# Patient Record
Sex: FEMALE | Race: BLACK OR AFRICAN AMERICAN | NOT HISPANIC OR LATINO | Employment: UNEMPLOYED | URBAN - METROPOLITAN AREA
[De-identification: names, ages, dates, MRNs, and addresses within clinical notes are randomized per-mention and may not be internally consistent; named-entity substitution may affect disease eponyms.]

---

## 2023-04-18 LAB
CHLAMYDIA TRACH., AMPLIFIED: NEGATIVE
N. GONORRHEA, AMPLIFIED: NEGATIVE
TRICHOMONAS VAGINALIS: NEGATIVE

## 2023-04-20 LAB
CHORIOGONADOTROPIN (MIU/ML) IN SER/PLAS: <3 IU/L
HEPATITIS B VIRUS SURFACE AG PRESENCE IN SERUM: NONREACTIVE
HEPATITIS C VIRUS AB PRESENCE IN SERUM: NONREACTIVE
HIV 1/ 2 AG/AB SCREEN: NONREACTIVE
SYPHILIS TOTAL AB: NONREACTIVE

## 2023-05-09 DIAGNOSIS — E55.9 VITAMIN D DEFICIENCY: Primary | ICD-10-CM

## 2023-05-09 RX ORDER — ERGOCALCIFEROL (VITAMIN D2) 50 MCG
50 CAPSULE ORAL DAILY
Qty: 90 CAPSULE | Refills: 1 | Status: SHIPPED | OUTPATIENT
Start: 2023-05-09 | End: 2023-09-12 | Stop reason: SDUPTHER

## 2023-05-22 DIAGNOSIS — Z00.00 PREVENTATIVE HEALTH CARE: ICD-10-CM

## 2023-05-22 RX ORDER — DULOXETIN HYDROCHLORIDE 60 MG/1
60 CAPSULE, DELAYED RELEASE ORAL DAILY
Qty: 90 CAPSULE | Refills: 0 | Status: SHIPPED | OUTPATIENT
Start: 2023-05-22 | End: 2023-09-01

## 2023-07-02 LAB
NIL(NEG) CONTROL SPOT COUNT: NORMAL
PANEL A SPOT COUNT: 0
PANEL B SPOT COUNT: 0
POS CONTROL SPOT COUNT: NORMAL
T-SPOT. TB INTERPRETATION: NEGATIVE

## 2023-08-15 LAB
BASOPHILS (10*3/UL) IN BLOOD BY AUTOMATED COUNT: 0.03 X10E9/L (ref 0–0.1)
BASOPHILS/100 LEUKOCYTES IN BLOOD BY AUTOMATED COUNT: 0.9 % (ref 0–2)
EOSINOPHILS (10*3/UL) IN BLOOD BY AUTOMATED COUNT: 0.04 X10E9/L (ref 0–0.7)
EOSINOPHILS/100 LEUKOCYTES IN BLOOD BY AUTOMATED COUNT: 1.1 % (ref 0–6)
ERYTHROCYTE DISTRIBUTION WIDTH (RATIO) BY AUTOMATED COUNT: 17.9 % (ref 11.5–14.5)
ERYTHROCYTE MEAN CORPUSCULAR HEMOGLOBIN CONCENTRATION (G/DL) BY AUTOMATED: 28.2 G/DL (ref 32–36)
ERYTHROCYTE MEAN CORPUSCULAR VOLUME (FL) BY AUTOMATED COUNT: 67 FL (ref 80–100)
ERYTHROCYTES (10*6/UL) IN BLOOD BY AUTOMATED COUNT: 4.58 X10E12/L (ref 4–5.2)
HEMATOCRIT (%) IN BLOOD BY AUTOMATED COUNT: 30.8 % (ref 36–46)
HEMOGLOBIN (G/DL) IN BLOOD: 8.7 G/DL (ref 12–16)
IMMATURE GRANULOCYTES/100 LEUKOCYTES IN BLOOD BY AUTOMATED COUNT: 0.3 % (ref 0–0.9)
LEUKOCYTES (10*3/UL) IN BLOOD BY AUTOMATED COUNT: 3.5 X10E9/L (ref 4.4–11.3)
LYMPHOCYTES (10*3/UL) IN BLOOD BY AUTOMATED COUNT: 1.89 X10E9/L (ref 1.2–4.8)
LYMPHOCYTES/100 LEUKOCYTES IN BLOOD BY AUTOMATED COUNT: 54.3 % (ref 13–44)
MONOCYTES (10*3/UL) IN BLOOD BY AUTOMATED COUNT: 0.25 X10E9/L (ref 0.1–1)
MONOCYTES/100 LEUKOCYTES IN BLOOD BY AUTOMATED COUNT: 7.2 % (ref 2–10)
NEUTROPHILS (10*3/UL) IN BLOOD BY AUTOMATED COUNT: 1.26 X10E9/L (ref 1.2–7.7)
NEUTROPHILS/100 LEUKOCYTES IN BLOOD BY AUTOMATED COUNT: 36.2 % (ref 40–80)
PLATELETS (10*3/UL) IN BLOOD AUTOMATED COUNT: 401 X10E9/L (ref 150–450)

## 2023-08-27 DIAGNOSIS — Z00.00 PREVENTATIVE HEALTH CARE: ICD-10-CM

## 2023-08-30 ENCOUNTER — HOSPITAL ENCOUNTER (OUTPATIENT)
Dept: DATA CONVERSION | Facility: HOSPITAL | Age: 23
End: 2023-08-31
Attending: OBSTETRICS & GYNECOLOGY | Admitting: OBSTETRICS & GYNECOLOGY
Payer: COMMERCIAL

## 2023-08-30 DIAGNOSIS — R10.2 PELVIC AND PERINEAL PAIN: ICD-10-CM

## 2023-08-30 DIAGNOSIS — D50.0 IRON DEFICIENCY ANEMIA SECONDARY TO BLOOD LOSS (CHRONIC): ICD-10-CM

## 2023-08-30 DIAGNOSIS — J45.909 UNSPECIFIED ASTHMA, UNCOMPLICATED (HHS-HCC): ICD-10-CM

## 2023-08-30 LAB
ERYTHROCYTE DISTRIBUTION WIDTH (RATIO) BY AUTOMATED COUNT: 17.8 % (ref 11.5–14.5)
ERYTHROCYTE MEAN CORPUSCULAR HEMOGLOBIN CONCENTRATION (G/DL) BY AUTOMATED: 29.7 G/DL (ref 32–36)
ERYTHROCYTE MEAN CORPUSCULAR VOLUME (FL) BY AUTOMATED COUNT: 65 FL (ref 80–100)
ERYTHROCYTES (10*6/UL) IN BLOOD BY AUTOMATED COUNT: 4.35 X10E12/L (ref 4–5.2)
HEMATOCRIT (%) IN BLOOD BY AUTOMATED COUNT: 28.3 % (ref 36–46)
HEMOGLOBIN (G/DL) IN BLOOD: 8.4 G/DL (ref 12–16)
LEUKOCYTES (10*3/UL) IN BLOOD BY AUTOMATED COUNT: 11.8 X10E9/L (ref 4.4–11.3)
PLATELETS (10*3/UL) IN BLOOD AUTOMATED COUNT: 341 X10E9/L (ref 150–450)

## 2023-08-31 LAB
BASOPHILS (10*3/UL) IN BLOOD BY AUTOMATED COUNT: 0.02 X10E9/L (ref 0–0.1)
BASOPHILS/100 LEUKOCYTES IN BLOOD BY AUTOMATED COUNT: 0.2 % (ref 0–2)
EOSINOPHILS (10*3/UL) IN BLOOD BY AUTOMATED COUNT: 0.01 X10E9/L (ref 0–0.7)
EOSINOPHILS/100 LEUKOCYTES IN BLOOD BY AUTOMATED COUNT: 0.1 % (ref 0–6)
ERYTHROCYTE DISTRIBUTION WIDTH (RATIO) BY AUTOMATED COUNT: 17.8 % (ref 11.5–14.5)
ERYTHROCYTE MEAN CORPUSCULAR HEMOGLOBIN CONCENTRATION (G/DL) BY AUTOMATED: 30 G/DL (ref 32–36)
ERYTHROCYTE MEAN CORPUSCULAR VOLUME (FL) BY AUTOMATED COUNT: 64 FL (ref 80–100)
ERYTHROCYTES (10*6/UL) IN BLOOD BY AUTOMATED COUNT: 4.39 X10E12/L (ref 4–5.2)
HEMATOCRIT (%) IN BLOOD BY AUTOMATED COUNT: 28 % (ref 36–46)
HEMOGLOBIN (G/DL) IN BLOOD: 8.4 G/DL (ref 12–16)
IMMATURE GRANULOCYTES/100 LEUKOCYTES IN BLOOD BY AUTOMATED COUNT: 0.3 % (ref 0–0.9)
LEUKOCYTES (10*3/UL) IN BLOOD BY AUTOMATED COUNT: 10 X10E9/L (ref 4.4–11.3)
LYMPHOCYTES (10*3/UL) IN BLOOD BY AUTOMATED COUNT: 2.44 X10E9/L (ref 1.2–4.8)
LYMPHOCYTES/100 LEUKOCYTES IN BLOOD BY AUTOMATED COUNT: 24.4 % (ref 13–44)
MONOCYTES (10*3/UL) IN BLOOD BY AUTOMATED COUNT: 0.65 X10E9/L (ref 0.1–1)
MONOCYTES/100 LEUKOCYTES IN BLOOD BY AUTOMATED COUNT: 6.5 % (ref 2–10)
NEUTROPHILS (10*3/UL) IN BLOOD BY AUTOMATED COUNT: 6.84 X10E9/L (ref 1.2–7.7)
NEUTROPHILS/100 LEUKOCYTES IN BLOOD BY AUTOMATED COUNT: 68.5 % (ref 40–80)
PLATELETS (10*3/UL) IN BLOOD AUTOMATED COUNT: 345 X10E9/L (ref 150–450)

## 2023-09-01 RX ORDER — DULOXETIN HYDROCHLORIDE 60 MG/1
60 CAPSULE, DELAYED RELEASE ORAL DAILY
Qty: 90 CAPSULE | Refills: 0 | Status: SHIPPED | OUTPATIENT
Start: 2023-09-01

## 2023-09-05 PROBLEM — N76.0 BACTERIAL VAGINOSIS: Status: ACTIVE | Noted: 2023-09-05

## 2023-09-05 PROBLEM — S00.93XA CONTUSION OF HEAD: Status: ACTIVE | Noted: 2023-09-05

## 2023-09-05 PROBLEM — R10.2 CHRONIC PELVIC PAIN IN FEMALE: Status: ACTIVE | Noted: 2023-09-05

## 2023-09-05 PROBLEM — N83.201 HEMORRHAGIC CYST OF RIGHT OVARY: Status: ACTIVE | Noted: 2023-09-05

## 2023-09-05 PROBLEM — S20.219A CONTUSION OF CHEST WALL: Status: ACTIVE | Noted: 2023-09-05

## 2023-09-05 PROBLEM — R06.02 SHORTNESS OF BREATH: Status: ACTIVE | Noted: 2023-09-05
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
## 2023-09-05 PROBLEM — G47.9 SLEEP DIFFICULTIES: Status: ACTIVE | Noted: 2023-09-05

## 2023-09-05 PROBLEM — J45.901 ASTHMA EXACERBATION (HHS-HCC): Status: ACTIVE | Noted: 2023-09-05

## 2023-09-05 PROBLEM — N93.9 ABNORMAL VAGINAL BLEEDING: Status: ACTIVE | Noted: 2023-09-05

## 2023-09-05 PROBLEM — J45.909 ASTHMA (HHS-HCC): Status: ACTIVE | Noted: 2023-09-05

## 2023-09-05 PROBLEM — N92.1 MENORRHAGIA WITH IRREGULAR CYCLE: Status: ACTIVE | Noted: 2023-09-05

## 2023-09-05 PROBLEM — G89.29 CHRONIC PELVIC PAIN IN FEMALE: Status: ACTIVE | Noted: 2023-09-05

## 2023-09-05 PROBLEM — E55.9 VITAMIN D DEFICIENCY: Status: ACTIVE | Noted: 2023-09-05

## 2023-09-05 PROBLEM — S13.9XXA NECK SPRAIN: Status: ACTIVE | Noted: 2023-09-05

## 2023-09-05 PROBLEM — D50.9 IRON DEFICIENCY ANEMIA: Status: ACTIVE | Noted: 2023-09-05

## 2023-09-05 PROBLEM — N76.0 ACUTE VAGINITIS: Status: ACTIVE | Noted: 2023-09-05

## 2023-09-05 PROBLEM — S00.83XA FACIAL CONTUSION: Status: ACTIVE | Noted: 2023-09-05

## 2023-09-05 PROBLEM — R10.30 ABDOMINAL PAIN, LOWER: Status: ACTIVE | Noted: 2023-09-05

## 2023-09-05 PROBLEM — B37.9 YEAST INFECTION: Status: ACTIVE | Noted: 2023-09-05

## 2023-09-05 PROBLEM — B96.89 BACTERIAL VAGINOSIS: Status: ACTIVE | Noted: 2023-09-05

## 2023-09-05 PROBLEM — N89.8 VAGINAL DISCHARGE: Status: ACTIVE | Noted: 2023-09-05

## 2023-09-05 PROBLEM — G43.119 INTRACTABLE MIGRAINE WITH AURA WITHOUT STATUS MIGRAINOSUS: Status: ACTIVE | Noted: 2023-09-05

## 2023-09-05 PROBLEM — R05.3 PERSISTENT COUGH: Status: ACTIVE | Noted: 2023-09-05

## 2023-09-05 PROBLEM — F52.6 SEXUAL PAIN DISORDER: Status: ACTIVE | Noted: 2023-09-05

## 2023-09-05 PROBLEM — N94.6 DYSMENORRHEA: Status: ACTIVE | Noted: 2023-09-05

## 2023-09-05 PROBLEM — M62.89 PFD (PELVIC FLOOR DYSFUNCTION): Status: ACTIVE | Noted: 2023-09-05

## 2023-09-05 RX ORDER — SULFAMETHOXAZOLE AND TRIMETHOPRIM 800; 160 MG/1; MG/1
1 TABLET ORAL 2 TIMES DAILY
COMMUNITY
Start: 2023-08-02 | End: 2023-09-13 | Stop reason: ALTCHOICE

## 2023-09-05 RX ORDER — FERROUS SULFATE 325(65) MG
65 TABLET ORAL 2 TIMES DAILY
COMMUNITY
Start: 2022-10-17

## 2023-09-05 RX ORDER — MOMETASONE FUROATE AND FORMOTEROL FUMARATE DIHYDRATE 100; 5 UG/1; UG/1
2 AEROSOL RESPIRATORY (INHALATION) 2 TIMES DAILY
COMMUNITY
Start: 2020-10-28 | End: 2023-09-12 | Stop reason: SDUPTHER

## 2023-09-05 RX ORDER — ESCITALOPRAM OXALATE 10 MG/1
10 TABLET ORAL
COMMUNITY
Start: 2023-07-19

## 2023-09-05 RX ORDER — NORETHINDRONE 5 MG/1
5 TABLET ORAL DAILY
COMMUNITY
Start: 2022-05-19

## 2023-09-12 ENCOUNTER — OFFICE VISIT (OUTPATIENT)
Dept: PRIMARY CARE | Facility: CLINIC | Age: 23
End: 2023-09-12
Payer: COMMERCIAL

## 2023-09-12 VITALS
HEART RATE: 82 BPM | DIASTOLIC BLOOD PRESSURE: 62 MMHG | BODY MASS INDEX: 30.04 KG/M2 | HEIGHT: 60 IN | SYSTOLIC BLOOD PRESSURE: 100 MMHG | WEIGHT: 153 LBS

## 2023-09-12 DIAGNOSIS — F50.89 PICA: ICD-10-CM

## 2023-09-12 DIAGNOSIS — E55.9 VITAMIN D DEFICIENCY: ICD-10-CM

## 2023-09-12 DIAGNOSIS — F31.9 BIPOLAR AFFECTIVE DISORDER, REMISSION STATUS UNSPECIFIED (MULTI): ICD-10-CM

## 2023-09-12 DIAGNOSIS — G47.9 SLEEP DISTURBANCE: ICD-10-CM

## 2023-09-12 DIAGNOSIS — J45.909 ASTHMA, UNSPECIFIED ASTHMA SEVERITY, UNSPECIFIED WHETHER COMPLICATED, UNSPECIFIED WHETHER PERSISTENT (HHS-HCC): ICD-10-CM

## 2023-09-12 DIAGNOSIS — Z00.00 ENCOUNTER FOR GENERAL ADULT MEDICAL EXAMINATION WITHOUT ABNORMAL FINDINGS: ICD-10-CM

## 2023-09-12 DIAGNOSIS — R53.83 FATIGUE, UNSPECIFIED TYPE: Primary | ICD-10-CM

## 2023-09-12 PROBLEM — F32.9 MAJOR DEPRESSIVE DISORDER, SINGLE EPISODE, UNSPECIFIED: Status: ACTIVE | Noted: 2023-08-25

## 2023-09-12 PROBLEM — F39 UNSPECIFIED MOOD (AFFECTIVE) DISORDER (CMS-HCC): Status: ACTIVE | Noted: 2023-08-25

## 2023-09-12 PROBLEM — F43.10 POST-TRAUMATIC STRESS DISORDER, UNSPECIFIED: Status: ACTIVE | Noted: 2023-08-25

## 2023-09-12 LAB
ANION GAP IN SER/PLAS: 11 MMOL/L (ref 10–20)
BASOPHILS (10*3/UL) IN BLOOD BY AUTOMATED COUNT: 0.02 X10E9/L (ref 0–0.1)
BASOPHILS/100 LEUKOCYTES IN BLOOD BY AUTOMATED COUNT: 0.4 % (ref 0–2)
CALCIDIOL (25 OH VITAMIN D3) (NG/ML) IN SER/PLAS: 26 NG/ML
CALCIUM (MG/DL) IN SER/PLAS: 9.1 MG/DL (ref 8.6–10.6)
CARBON DIOXIDE, TOTAL (MMOL/L) IN SER/PLAS: 25 MMOL/L (ref 21–32)
CHLORIDE (MMOL/L) IN SER/PLAS: 106 MMOL/L (ref 98–107)
CREATININE (MG/DL) IN SER/PLAS: 0.75 MG/DL (ref 0.5–1.05)
EOSINOPHILS (10*3/UL) IN BLOOD BY AUTOMATED COUNT: 0.07 X10E9/L (ref 0–0.7)
EOSINOPHILS/100 LEUKOCYTES IN BLOOD BY AUTOMATED COUNT: 1.5 % (ref 0–6)
ERYTHROCYTE DISTRIBUTION WIDTH (RATIO) BY AUTOMATED COUNT: 18.7 % (ref 11.5–14.5)
ERYTHROCYTE MEAN CORPUSCULAR HEMOGLOBIN CONCENTRATION (G/DL) BY AUTOMATED: 28 G/DL (ref 32–36)
ERYTHROCYTE MEAN CORPUSCULAR VOLUME (FL) BY AUTOMATED COUNT: 68 FL (ref 80–100)
ERYTHROCYTES (10*6/UL) IN BLOOD BY AUTOMATED COUNT: 4.44 X10E12/L (ref 4–5.2)
FERRITIN (UG/LL) IN SER/PLAS: 20 UG/L (ref 8–150)
GFR FEMALE: >90 ML/MIN/1.73M2
GLUCOSE (MG/DL) IN SER/PLAS: 90 MG/DL (ref 74–99)
HEMATOCRIT (%) IN BLOOD BY AUTOMATED COUNT: 30.4 % (ref 36–46)
HEMOGLOBIN (G/DL) IN BLOOD: 8.5 G/DL (ref 12–16)
IMMATURE GRANULOCYTES/100 LEUKOCYTES IN BLOOD BY AUTOMATED COUNT: 0.2 % (ref 0–0.9)
IRON (UG/DL) IN SER/PLAS: 18 UG/DL (ref 35–150)
IRON BINDING CAPACITY (UG/DL) IN SER/PLAS: 430 UG/DL (ref 240–445)
IRON SATURATION (%) IN SER/PLAS: 4 % (ref 25–45)
LEUKOCYTES (10*3/UL) IN BLOOD BY AUTOMATED COUNT: 4.8 X10E9/L (ref 4.4–11.3)
LYMPHOCYTES (10*3/UL) IN BLOOD BY AUTOMATED COUNT: 2.01 X10E9/L (ref 1.2–4.8)
LYMPHOCYTES/100 LEUKOCYTES IN BLOOD BY AUTOMATED COUNT: 41.7 % (ref 13–44)
MONOCYTES (10*3/UL) IN BLOOD BY AUTOMATED COUNT: 0.27 X10E9/L (ref 0.1–1)
MONOCYTES/100 LEUKOCYTES IN BLOOD BY AUTOMATED COUNT: 5.6 % (ref 2–10)
NEUTROPHILS (10*3/UL) IN BLOOD BY AUTOMATED COUNT: 2.44 X10E9/L (ref 1.2–7.7)
NEUTROPHILS/100 LEUKOCYTES IN BLOOD BY AUTOMATED COUNT: 50.6 % (ref 40–80)
NRBC (PER 100 WBCS) BY AUTOMATED COUNT: 0 /100 WBC (ref 0–0)
PLATELETS (10*3/UL) IN BLOOD AUTOMATED COUNT: 419 X10E9/L (ref 150–450)
POTASSIUM (MMOL/L) IN SER/PLAS: 4.1 MMOL/L (ref 3.5–5.3)
SODIUM (MMOL/L) IN SER/PLAS: 138 MMOL/L (ref 136–145)
THYROTROPIN (MIU/L) IN SER/PLAS BY DETECTION LIMIT <= 0.05 MIU/L: 2.56 MIU/L (ref 0.44–3.98)
UREA NITROGEN (MG/DL) IN SER/PLAS: 9 MG/DL (ref 6–23)

## 2023-09-12 PROCEDURE — 80048 BASIC METABOLIC PNL TOTAL CA: CPT

## 2023-09-12 PROCEDURE — 83540 ASSAY OF IRON: CPT

## 2023-09-12 PROCEDURE — 82728 ASSAY OF FERRITIN: CPT

## 2023-09-12 PROCEDURE — 85025 COMPLETE CBC W/AUTO DIFF WBC: CPT

## 2023-09-12 PROCEDURE — 84443 ASSAY THYROID STIM HORMONE: CPT

## 2023-09-12 PROCEDURE — 83550 IRON BINDING TEST: CPT

## 2023-09-12 PROCEDURE — 99214 OFFICE O/P EST MOD 30 MIN: CPT | Performed by: NURSE PRACTITIONER

## 2023-09-12 PROCEDURE — 82306 VITAMIN D 25 HYDROXY: CPT

## 2023-09-12 RX ORDER — MOMETASONE FUROATE AND FORMOTEROL FUMARATE DIHYDRATE 100; 5 UG/1; UG/1
2 AEROSOL RESPIRATORY (INHALATION) 2 TIMES DAILY
Qty: 13 G | Refills: 1 | Status: SHIPPED | OUTPATIENT
Start: 2023-09-12

## 2023-09-12 RX ORDER — ERGOCALCIFEROL (VITAMIN D2) 50 MCG
50 CAPSULE ORAL DAILY
Qty: 90 CAPSULE | Refills: 1 | Status: SHIPPED | OUTPATIENT
Start: 2023-09-12 | End: 2024-09-11

## 2023-09-12 RX ORDER — ALBUTEROL SULFATE 90 UG/1
2 AEROSOL, METERED RESPIRATORY (INHALATION) EVERY 4 HOURS PRN
Qty: 18 G | Refills: 1 | OUTPATIENT
Start: 2023-09-12 | End: 2023-11-01

## 2023-09-12 ASSESSMENT — PATIENT HEALTH QUESTIONNAIRE - PHQ9
3. TROUBLE FALLING OR STAYING ASLEEP OR SLEEPING TOO MUCH: MORE THAN HALF THE DAYS
5. POOR APPETITE OR OVEREATING: MORE THAN HALF THE DAYS
6. FEELING BAD ABOUT YOURSELF - OR THAT YOU ARE A FAILURE OR HAVE LET YOURSELF OR YOUR FAMILY DOWN: NOT AT ALL
SUM OF ALL RESPONSES TO PHQ QUESTIONS 1-9: 12
4. FEELING TIRED OR HAVING LITTLE ENERGY: MORE THAN HALF THE DAYS
7. TROUBLE CONCENTRATING ON THINGS, SUCH AS READING THE NEWSPAPER OR WATCHING TELEVISION: MORE THAN HALF THE DAYS
8. MOVING OR SPEAKING SO SLOWLY THAT OTHER PEOPLE COULD HAVE NOTICED. OR THE OPPOSITE, BEING SO FIGETY OR RESTLESS THAT YOU HAVE BEEN MOVING AROUND A LOT MORE THAN USUAL: NOT AT ALL
2. FEELING DOWN, DEPRESSED OR HOPELESS: MORE THAN HALF THE DAYS
9. THOUGHTS THAT YOU WOULD BE BETTER OFF DEAD, OR OF HURTING YOURSELF: NOT AT ALL
1. LITTLE INTEREST OR PLEASURE IN DOING THINGS: MORE THAN HALF THE DAYS
SUM OF ALL RESPONSES TO PHQ9 QUESTIONS 1 AND 2: 4

## 2023-09-12 ASSESSMENT — ENCOUNTER SYMPTOMS
LOSS OF SENSATION IN FEET: 0
OCCASIONAL FEELINGS OF UNSTEADINESS: 0
DEPRESSION: 0

## 2023-09-12 NOTE — PROGRESS NOTES
Subjective   Patient ID: Argelia Valentine is a 23 y.o. female who presents for Follow-up and Postop Visit.    Argelia is a 23 year old female who presents to the office today with multiple concerns. Stated she has been sweating; particularly at bedtime. She recently had diagnostic hysteroscopy by Dr. Jensen. Healing well.    Migraines with auras. Has been taking Ibuprofen; not working. Triptans caused heavy breathing, sweating, vomiting.     Wakes up gasping for air during the night. Feels tired during the day.     Sees Nani Luna, [facility] on [place].     Requesting sleep study. Wakes up gasping for air.          Review of Systems   All other systems reviewed and are negative.      Objective   /62   Pulse 82   Ht 1.524 m (5')   Wt 69.4 kg (153 lb)   BMI 29.88 kg/m²     Physical Exam  Constitutional:       Appearance: Normal appearance.   HENT:      Head: Normocephalic and atraumatic.      Right Ear: Tympanic membrane normal.      Left Ear: Tympanic membrane normal.      Nose: Nose normal.      Mouth/Throat:      Mouth: Mucous membranes are moist.      Pharynx: Oropharynx is clear.   Eyes:      Extraocular Movements: Extraocular movements intact.      Conjunctiva/sclera: Conjunctivae normal.      Pupils: Pupils are equal, round, and reactive to light.   Cardiovascular:      Rate and Rhythm: Normal rate and regular rhythm.      Pulses: Normal pulses.      Heart sounds: Normal heart sounds.   Pulmonary:      Effort: Pulmonary effort is normal.      Breath sounds: Normal breath sounds.   Abdominal:      General: Abdomen is flat. Bowel sounds are normal.      Palpations: Abdomen is soft.   Musculoskeletal:         General: Normal range of motion.      Cervical back: Normal range of motion and neck supple.   Skin:     General: Skin is warm and dry.      Capillary Refill: Capillary refill takes less than 2 seconds.   Neurological:      General: No focal deficit present.      Mental Status: She is alert  and oriented to person, place, and time. Mental status is at baseline.   Psychiatric:         Mood and Affect: Mood normal.         Behavior: Behavior normal.         Thought Content: Thought content normal.         Judgment: Judgment normal.         Assessment/Plan   Problem List Items Addressed This Visit       Asthma    Relevant Medications    Dulera 100-5 mcg/actuation inhaler    Vitamin D deficiency    Relevant Medications    ergocalciferol (Vitamin D-2) 50 MCG (2000 UT) capsule capsule    Other Relevant Orders    Vitamin D 25-Hydroxy,Total (for eval of Vitamin D levels) (Completed)    Bipolar disorder, unspecified (CMS/HCC)     Stable. Followed by psychiatry.          Other Visit Diagnoses       Fatigue, unspecified type    -  Primary    Relevant Orders    CBC and Auto Differential (Completed)    TSH with reflex to Free T4 if abnormal (Completed)    Basic Metabolic Panel (Completed)    Encounter for general adult medical examination without abnormal findings        Relevant Medications    albuterol (Ventolin HFA) 90 mcg/actuation inhaler    Pica        Relevant Orders    Iron and TIBC (Completed)    Ferritin (Completed)    Sleep disturbance        Relevant Orders    Home sleep apnea test (HSAT)          1) Pica: iron studies ordered. Counseled on foods that are high in iron (red meat, seafood, chicken, beans, green leafy vegetables, iron-fortified cereals, breads and pasta).    2) Sleep disturbances: home sleep study ordered.     3) Fatigue: blood work ordered. Recommend multi-vitamin daily along with exercising 150 minutes/week with focus on cardio, strengthening and toning.

## 2023-09-15 DIAGNOSIS — E55.9 VITAMIN D DEFICIENCY: ICD-10-CM

## 2023-09-15 DIAGNOSIS — D50.9 IRON DEFICIENCY ANEMIA, UNSPECIFIED IRON DEFICIENCY ANEMIA TYPE: ICD-10-CM

## 2023-09-15 RX ORDER — ACETAMINOPHEN 500 MG
50 TABLET ORAL DAILY
Qty: 90 CAPSULE | Refills: 1 | Status: SHIPPED | OUTPATIENT
Start: 2023-09-15

## 2023-09-15 RX ORDER — FERROUS SULFATE 325(65) MG
325 TABLET, DELAYED RELEASE (ENTERIC COATED) ORAL 2 TIMES DAILY
Qty: 180 TABLET | Refills: 1 | Status: SHIPPED | OUTPATIENT
Start: 2023-09-15

## 2023-09-20 ENCOUNTER — OFFICE VISIT (OUTPATIENT)
Dept: PRIMARY CARE | Facility: CLINIC | Age: 23
End: 2023-09-20
Payer: COMMERCIAL

## 2023-09-20 ENCOUNTER — TELEPHONE (OUTPATIENT)
Dept: PRIMARY CARE | Facility: CLINIC | Age: 23
End: 2023-09-20

## 2023-09-20 VITALS
WEIGHT: 153 LBS | SYSTOLIC BLOOD PRESSURE: 90 MMHG | BODY MASS INDEX: 30.04 KG/M2 | HEART RATE: 77 BPM | DIASTOLIC BLOOD PRESSURE: 64 MMHG | HEIGHT: 60 IN

## 2023-09-20 DIAGNOSIS — J22 ACUTE RESPIRATORY INFECTION: ICD-10-CM

## 2023-09-20 DIAGNOSIS — R11.2 NAUSEA AND VOMITING, UNSPECIFIED VOMITING TYPE: Primary | ICD-10-CM

## 2023-09-20 PROCEDURE — 99214 OFFICE O/P EST MOD 30 MIN: CPT | Performed by: NURSE PRACTITIONER

## 2023-09-20 PROCEDURE — 4004F PT TOBACCO SCREEN RCVD TLK: CPT | Performed by: NURSE PRACTITIONER

## 2023-09-20 PROCEDURE — 87635 SARS-COV-2 COVID-19 AMP PRB: CPT

## 2023-09-20 RX ORDER — ONDANSETRON 4 MG/1
4 TABLET, ORALLY DISINTEGRATING ORAL EVERY 8 HOURS PRN
Qty: 10 TABLET | Refills: 0 | Status: SHIPPED | OUTPATIENT
Start: 2023-09-20 | End: 2023-09-27

## 2023-09-20 RX ORDER — CEFDINIR 300 MG/1
300 CAPSULE ORAL 2 TIMES DAILY
Qty: 14 CAPSULE | Refills: 0 | Status: SHIPPED | OUTPATIENT
Start: 2023-09-20 | End: 2023-09-27

## 2023-09-20 ASSESSMENT — PATIENT HEALTH QUESTIONNAIRE - PHQ9
SUM OF ALL RESPONSES TO PHQ9 QUESTIONS 1 AND 2: 0
1. LITTLE INTEREST OR PLEASURE IN DOING THINGS: NOT AT ALL
2. FEELING DOWN, DEPRESSED OR HOPELESS: NOT AT ALL

## 2023-09-20 ASSESSMENT — ENCOUNTER SYMPTOMS
OCCASIONAL FEELINGS OF UNSTEADINESS: 0
DEPRESSION: 0
LOSS OF SENSATION IN FEET: 0

## 2023-09-20 NOTE — PROGRESS NOTES
Subjective   Patient ID: Argelia Valentine is a 23 y.o. female who presents for Follow-up (1 week follow up visit ).    Argelia presents to the office today with concerns of sneezing, cough, cold/hot sensation and sweating. Has been having nausea and vomiting. Ears are ringing. Denied fever, but has had chills.         Review of Systems   HENT:  Positive for rhinorrhea.    Respiratory:  Positive for cough.    All other systems reviewed and are negative.      Objective   BP 90/64   Pulse 77   Ht 1.524 m (5')   Wt 69.4 kg (153 lb)   BMI 29.88 kg/m²     Physical Exam  Constitutional:       Appearance: Normal appearance.   HENT:      Head: Normocephalic and atraumatic.      Right Ear: Tympanic membrane normal.      Left Ear: Tympanic membrane normal.      Ears:      Comments: Right external ear canal with erythema.     Nose: Congestion (congestion and erythema.) present.      Mouth/Throat:      Mouth: Mucous membranes are moist.      Pharynx: Oropharynx is clear.   Eyes:      Extraocular Movements: Extraocular movements intact.      Conjunctiva/sclera: Conjunctivae normal.      Pupils: Pupils are equal, round, and reactive to light.   Cardiovascular:      Rate and Rhythm: Normal rate and regular rhythm.      Pulses: Normal pulses.      Heart sounds: Normal heart sounds.   Pulmonary:      Effort: Pulmonary effort is normal.      Breath sounds: Normal breath sounds.   Abdominal:      General: Abdomen is flat. Bowel sounds are normal.      Palpations: Abdomen is soft.   Musculoskeletal:         General: Normal range of motion.      Cervical back: Normal range of motion and neck supple.   Skin:     General: Skin is warm and dry.      Capillary Refill: Capillary refill takes less than 2 seconds.   Neurological:      General: No focal deficit present.      Mental Status: She is alert and oriented to person, place, and time. Mental status is at baseline.   Psychiatric:         Mood and Affect: Mood normal.         Behavior:  Behavior normal.         Thought Content: Thought content normal.         Judgment: Judgment normal.         Assessment/Plan   Problem List Items Addressed This Visit    None  Visit Diagnoses       Nausea and vomiting, unspecified vomiting type    -  Primary    Relevant Medications    ondansetron ODT (Zofran-ODT) 4 mg disintegrating tablet    Acute respiratory infection        Relevant Medications    cefdinir (Omnicef) 300 mg capsule    Other Relevant Orders    Sars-CoV-2 PCR, Symptomatic (Completed)          1) Acute respiratory symptoms: you were prescribed Cefdinir. Make sure you are staying well hydrated with fluids. Nasopharyngeal swab collected for Covid. Work note provided for today.    2) Nausea and vomiting: prescription sent to pharmacy for Ondansetron.

## 2023-09-21 LAB — SARS-COV-2 RESULT: DETECTED

## 2023-09-22 ENCOUNTER — TELEPHONE (OUTPATIENT)
Dept: PRIMARY CARE | Facility: CLINIC | Age: 23
End: 2023-09-22
Payer: COMMERCIAL

## 2023-09-22 DIAGNOSIS — U07.1 COVID: ICD-10-CM

## 2023-09-22 RX ORDER — NIRMATRELVIR AND RITONAVIR 300-100 MG
3 KIT ORAL 2 TIMES DAILY
Qty: 30 TABLET | Refills: 0 | Status: SHIPPED | OUTPATIENT
Start: 2023-09-22 | End: 2023-09-27

## 2023-09-23 ASSESSMENT — ENCOUNTER SYMPTOMS
RHINORRHEA: 1
COUGH: 1

## 2023-09-29 VITALS — HEIGHT: 67 IN | BODY MASS INDEX: 23.53 KG/M2 | WEIGHT: 149.91 LBS

## 2023-09-30 NOTE — H&P
"    History & Physical Reviewed:   Pregnant/Lactating:  ·  Are You Pregnant no (1)   ·  Are You Currently Breastfeeding no (1)     I have reviewed the History and Physical dated:  15-Aug-2023   History and Physical reviewed and relevant findings noted. Patient examined to review pertinent physical  findings.: No significant changes   Home Medications Reviewed: no changes noted   Allergies Reviewed: no changes noted       ERAS (Enhanced Recovery After Surgery):  ·  ERAS Patient: no     Consent:   COVID-19 Consent:  ·  COVID-19 Risk Consent Surgeon has reviewed key risks related to the risk of jane COVID-19 and if they contract COVID-19 what the risks are.       Electronic Signatures:  Yefri Jensen)  (Signed 30-Aug-2023 11:58)   Authored: History & Physical Reviewed, ERAS, Consent,  Note Completion      Last Updated: 30-Aug-2023 11:58 by Yefri Jensen)    References:  1.  Data Referenced From \"Patient Profile - Preop v3\" 30-Aug-2023 11:20   "

## 2023-09-30 NOTE — PROGRESS NOTES
Service: Gynecology/Obstetrics     Subjective Data:   NIC SHINE is a 23 year old Female who is Hospital Day # 2 and POD #1 for 1. Diagnostic Laparoscopy;     Patient feels better though still complaining of severe abdominal pain.  Patient has a history of chronic pelvic pain but notes that this does seem worse.  Currently pain is 2 out of 10 over incision  sites.  Denies nausea vomiting fever chills.  Did receive treatment for asthma.    Overnight Events: Patient had an uneventful night.     Objective Data:     Objective Information:      T   P  R  BP   MAP  SpO2   Value  36.6  98  17  114/69   75  100%  Date/Time 8/31 8:26 8/31 8:26 8/31 8:26 8/31 8:26  8/30 19:54 8/31 8:26  Range  (36.4C - 36.7C )  (74 - 98 )  (17 - 20 )  (97 - 124 )/ (61 - 69 )  (75 - 75 )  (97% - 100% )      Pain reported at 8/31 4:00: sleeping    ---- Intake and Output  -----  Mn/Dy/Year Time  Intake   Output  Net  Aug 31, 2023 6:00 am  660   775  -115  Aug 30, 2023 10:00 pm  247   730  -483  Aug 30, 2023 2:00 pm  1200   120  1080    The Intake and Output Totals for the last 24 hours are:      Intake   Output  Net      [id number]    Physical Exam by System:    Constitutional: Well developed, awake/alert/oriented  x3, no distress, alert and cooperative   Respiratory/Thorax: Patent airways, CTAB, normal  breath sounds with good chest expansion, thorax symmetric   Cardiovascular: Regular, rate and rhythm, no murmurs,  2+ equal pulses of the extremities, normal S 1and S 2   Gastrointestinal: Nondistended, soft, non-tender,  no rebound tenderness or guarding, no masses palpable, no organomegaly, +BS, no bruits. Incision C/D/I   Genitourinary: No Discharge, vesicles or other abnormalities   Extremities: normal extremities, no cyanosis edema,  contusions or wounds, no clubbing   Lymphatic: No significant lymphadenopathy   Psychological: Appropriate mood and behavior     Recent Lab Results:    Results:    CBC: 8/31/2023 07:46               \     Hgb     /                              \     8.4 L    /  WBC  ----------------  Plt               10.0       ----------------    345              /     Hct     \                              /     28.0 L    \            RBC: 4.39     MCV: 64 L    Neutrophil %: 68.5    Assessment and Plan:   Daily Risk Screen:  ·  Does patient have an indwelling urinary catheter? yes   ·  Plan for indwelling urinary catheter removal today? yes     Comorbidities:  ·  Comorbidity anemia   ·  Anemia chronic blood loss anemia     Code Status:  ·  Code Status Full Code     Assessment:    Patient postop day #1 from diagnostic laparoscopy.  Patient was held overnight due to pain management and asthma.  Also question of retroperitoneal hematoma during  procedure although this is uncertain.  Hematocrit stayed stable and patient's abdomen is unremarkable except for pain at the incision site.But be chronically anemic  Discussed findings with patient.  At this point we will plan on discharge today and follow-up  in office.  Discussed referral to pain management.      Electronic Signatures:  Yefri Jensen)  (Signed 31-Aug-2023 08:53)   Authored: Service, Subjective Data, Objective Data, Assessment  and Plan, Note Completion      Last Updated: 31-Aug-2023 08:53 by Yefri Jensen)

## 2023-10-01 NOTE — OP NOTE
PROCEDURE DETAILS    Preoperative Diagnosis:  Pelvic and perineal pain, R10.2    Postoperative Diagnosis:  Pelvic and perineal pain, R10.2    Surgeon: Yefri Jensen    Consult Dr Ferrari  Resident/Fellow/Other Assistant: Jamar Robertson    Procedure:  1. Diagnostic Laparoscopy    Anesthesia: Delon Das  Estimated Blood Loss: 10  Findings: Normal pelvis  Retroperitoneal hematoma  Specimens(s) Collected: no,     Patient Returned To/Condition: RR in good condition                                Attestation:   Note Completion:  Attending Attestation I performed the procedure without a resident         Electronic Signatures:  Yefri Jensen)  (Signed 30-Aug-2023 13:25)   Authored: Post-Operative Note, Chart Review, Note Completion      Last Updated: 30-Aug-2023 13:25 by Yefri Jensen)

## 2023-11-01 ENCOUNTER — HOSPITAL ENCOUNTER (EMERGENCY)
Facility: HOSPITAL | Age: 23
Discharge: HOME | End: 2023-11-01
Attending: EMERGENCY MEDICINE
Payer: COMMERCIAL

## 2023-11-01 ENCOUNTER — APPOINTMENT (OUTPATIENT)
Dept: RADIOLOGY | Facility: HOSPITAL | Age: 23
End: 2023-11-01
Payer: COMMERCIAL

## 2023-11-01 VITALS
SYSTOLIC BLOOD PRESSURE: 112 MMHG | BODY MASS INDEX: 30.43 KG/M2 | WEIGHT: 154.98 LBS | HEART RATE: 76 BPM | DIASTOLIC BLOOD PRESSURE: 66 MMHG | RESPIRATION RATE: 17 BRPM | OXYGEN SATURATION: 100 % | HEIGHT: 60 IN | TEMPERATURE: 98.3 F

## 2023-11-01 DIAGNOSIS — J20.9 ACUTE BRONCHITIS, UNSPECIFIED ORGANISM: Primary | ICD-10-CM

## 2023-11-01 LAB
FLUAV RNA RESP QL NAA+PROBE: NOT DETECTED
FLUBV RNA RESP QL NAA+PROBE: NOT DETECTED
HCG UR QL IA.RAPID: NEGATIVE
RSV RNA RESP QL NAA+PROBE: NOT DETECTED
SARS-COV-2 RNA RESP QL NAA+PROBE: NOT DETECTED

## 2023-11-01 PROCEDURE — 81025 URINE PREGNANCY TEST: CPT | Performed by: EMERGENCY MEDICINE

## 2023-11-01 PROCEDURE — 2500000002 HC RX 250 W HCPCS SELF ADMINISTERED DRUGS (ALT 637 FOR MEDICARE OP, ALT 636 FOR OP/ED): Performed by: EMERGENCY MEDICINE

## 2023-11-01 PROCEDURE — 87637 SARSCOV2&INF A&B&RSV AMP PRB: CPT | Performed by: EMERGENCY MEDICINE

## 2023-11-01 PROCEDURE — 99283 EMERGENCY DEPT VISIT LOW MDM: CPT | Mod: 25 | Performed by: EMERGENCY MEDICINE

## 2023-11-01 PROCEDURE — 71046 X-RAY EXAM CHEST 2 VIEWS: CPT | Performed by: RADIOLOGY

## 2023-11-01 PROCEDURE — 71046 X-RAY EXAM CHEST 2 VIEWS: CPT | Mod: FY

## 2023-11-01 PROCEDURE — 94640 AIRWAY INHALATION TREATMENT: CPT

## 2023-11-01 PROCEDURE — 2500000004 HC RX 250 GENERAL PHARMACY W/ HCPCS (ALT 636 FOR OP/ED): Performed by: EMERGENCY MEDICINE

## 2023-11-01 RX ORDER — ALBUTEROL SULFATE 90 UG/1
2 AEROSOL, METERED RESPIRATORY (INHALATION) EVERY 4 HOURS PRN
Qty: 18 G | Refills: 0 | Status: SHIPPED | OUTPATIENT
Start: 2023-11-01 | End: 2024-05-20

## 2023-11-01 RX ORDER — ALBUTEROL SULFATE 0.83 MG/ML
2.5 SOLUTION RESPIRATORY (INHALATION) ONCE
Status: COMPLETED | OUTPATIENT
Start: 2023-11-01 | End: 2023-11-01

## 2023-11-01 RX ORDER — PREDNISONE 20 MG/1
20 TABLET ORAL ONCE
Status: COMPLETED | OUTPATIENT
Start: 2023-11-01 | End: 2023-11-01

## 2023-11-01 RX ORDER — PREDNISONE 20 MG/1
40 TABLET ORAL DAILY
Qty: 10 TABLET | Refills: 0 | Status: SHIPPED | OUTPATIENT
Start: 2023-11-01 | End: 2023-11-06

## 2023-11-01 RX ORDER — AZITHROMYCIN 250 MG/1
TABLET, FILM COATED ORAL
Qty: 6 TABLET | Refills: 0 | Status: SHIPPED | OUTPATIENT
Start: 2023-11-01 | End: 2023-11-06

## 2023-11-01 RX ADMIN — PREDNISONE 20 MG: 20 TABLET ORAL at 08:40

## 2023-11-01 RX ADMIN — ALBUTEROL SULFATE 2.5 MG: 2.5 SOLUTION RESPIRATORY (INHALATION) at 08:58

## 2023-11-01 ASSESSMENT — PAIN SCALES - GENERAL: PAINLEVEL_OUTOF10: 2

## 2023-11-01 ASSESSMENT — PAIN - FUNCTIONAL ASSESSMENT: PAIN_FUNCTIONAL_ASSESSMENT: 0-10

## 2023-11-01 ASSESSMENT — PAIN DESCRIPTION - DESCRIPTORS: DESCRIPTORS: BURNING

## 2023-11-01 ASSESSMENT — PAIN DESCRIPTION - PAIN TYPE: TYPE: ACUTE PAIN

## 2023-11-01 ASSESSMENT — COLUMBIA-SUICIDE SEVERITY RATING SCALE - C-SSRS
1. IN THE PAST MONTH, HAVE YOU WISHED YOU WERE DEAD OR WISHED YOU COULD GO TO SLEEP AND NOT WAKE UP?: NO
2. HAVE YOU ACTUALLY HAD ANY THOUGHTS OF KILLING YOURSELF?: NO
6. HAVE YOU EVER DONE ANYTHING, STARTED TO DO ANYTHING, OR PREPARED TO DO ANYTHING TO END YOUR LIFE?: NO

## 2023-11-01 ASSESSMENT — PAIN DESCRIPTION - FREQUENCY: FREQUENCY: INTERMITTENT

## 2023-11-01 ASSESSMENT — PAIN DESCRIPTION - LOCATION: LOCATION: CHEST

## 2023-11-01 NOTE — DISCHARGE INSTRUCTIONS
Please use the albuterol inhaler every 4 hours as needed for wheezing.  Please take the prednisone as prescribed to treat for your asthma and bronchitis.  Please take the Z-Paul as prescribed to treat for bronchitis.  Please follow-up with your primary care physician in the next 2 to 3 days repeat exam to ensure improvement in symptoms or please return ED for any difficulty breathing, shortness of breath or any other concerning symptoms.

## 2023-11-01 NOTE — ED PROVIDER NOTES
HPI   Chief Complaint   Patient presents with    Cough     Pt coming from home with a productive cough for 2 weeks, shob but does not feel like it her asthma. Pt is a&ox4, warm and dry. Pt states her chest hurts when she coughs. No fevers noted or reported.        This is a 23-year-old female positive smoking history, positive asthma does present with 2-week history of cough with irritation and chest and associated sputum.  Does notice some blood streaks intermittently.  No shortness of breath.  No pleuritic pain.  No abdominal pain.  No vomiting or diarrhea.  No dysuria materia flank pain.  Does report some burning in the sinuses associated.                        No data recorded                Patient History   Past Medical History:   Diagnosis Date    Encounter for contraceptive management, unspecified 2018    Contraception management    Encounter for initial prescription of contraceptive pills 2016    Encounter for initial prescription of contraceptive pills    Encounter for initial prescription of injectable contraceptive 10/12/2020    Encounter for initial prescription of injectable contraceptive    Encounter for other specified surgical aftercare 10/01/2020    Postoperative visit    Encounter for other specified surgical aftercare 10/01/2020    Postoperative visit    Encounter for pregnancy test, result negative 2021    Negative pregnancy test    Encounter for pregnancy test, result positive     Positive pregnancy test    Encounter for removal of intrauterine contraceptive device 2017    Encounter for removal of intrauterine contraceptive device    Encounter for routine postpartum follow-up 2018    Postpartum exam    Encounter for routine postpartum follow-up 2017    Postpartum examination following  delivery    Encounter for screening for infections with a predominantly sexual mode of transmission 11/15/2022    Screen for STD (sexually transmitted disease)     Encounter for supervision of normal pregnancy, unspecified, second trimester 2017    Second trimester pregnancy    Encounter for supervision of normal pregnancy, unspecified, second trimester 2017    Second trimester pregnancy    Encounter for supervision of normal pregnancy, unspecified, third trimester 2018    Third trimester pregnancy    Gonococcal infection of lower genitourinary tract with periurethral and accessory gland abscess 2021    Infection of lower genitourinary tract with periurethral gland abscess due to Neisseria gonorrhea    Maternal care for unspecified type scar from previous  delivery     Previous  section complicating pregnancy    Other acute postprocedural pain 2020    Postoperative pain    Other conditions influencing health status 2015    History of pregnancy    Personal history of other complications of pregnancy, childbirth and the puerperium 2018    History of postpartum depression    Personal history of other diseases of the female genital tract 2017    History of amenorrhea    Personal history of other infectious and parasitic diseases 2021    History of trichomonal vaginitis    Streptococcus, group b, as the cause of diseases classified elsewhere     Group beta Strep positive     Past Surgical History:   Procedure Laterality Date    OTHER SURGICAL HISTORY  10/27/2020    Dilation and curettage    OTHER SURGICAL HISTORY  10/27/2020     section     Family History   Problem Relation Name Age of Onset    No Known Problems Mother      COPD Father      Stroke Sister      Hypertension Sister      Heart disease Sister       Social History     Tobacco Use    Smoking status: Some Days     Types: Cigarettes, Cigars    Smokeless tobacco: Never   Substance Use Topics    Alcohol use: Never    Drug use: Yes     Types: Marijuana       Physical Exam   ED Triage Vitals [23 0823]   Temp Heart Rate Resp BP   36.8 °C (98.3  °F) 76 17 112/66      SpO2 Temp Source Heart Rate Source Patient Position   100 % Oral -- Sitting      BP Location FiO2 (%)     Right arm --       Physical Exam  Vitals and nursing note reviewed.   Constitutional:       Appearance: Normal appearance. She is normal weight.   HENT:      Head: Normocephalic and atraumatic.      Nose: Nose normal.      Mouth/Throat:      Mouth: Mucous membranes are moist.   Eyes:      Extraocular Movements: Extraocular movements intact.      Conjunctiva/sclera: Conjunctivae normal.   Cardiovascular:      Rate and Rhythm: Normal rate and regular rhythm.      Pulses: Normal pulses.      Heart sounds: Normal heart sounds.   Pulmonary:      Effort: Pulmonary effort is normal.      Breath sounds: Normal breath sounds. No wheezing or rales.   Abdominal:      General: Abdomen is flat.      Tenderness: There is no right CVA tenderness, left CVA tenderness, guarding or rebound.   Musculoskeletal:         General: Normal range of motion.      Cervical back: Normal range of motion and neck supple.   Skin:     General: Skin is dry.      Capillary Refill: Capillary refill takes less than 2 seconds.   Neurological:      General: No focal deficit present.      Mental Status: She is alert and oriented to person, place, and time.   Psychiatric:         Mood and Affect: Mood normal.         Behavior: Behavior normal.         Thought Content: Thought content normal.         Judgment: Judgment normal.         ED Course & MDM   Diagnoses as of 11/01/23 1654   Acute bronchitis, unspecified organism       Medical Decision Making  X-rays obtained to rule out pneumonia.  Patient started albuterol nebulizer and prednisone 20 mg.  Pregnancy test is ordered.  COVID/flu/RSV are sent.  Clinically I suspect patient has bronchitis.    Amount and/or Complexity of Data Reviewed  Labs: ordered. Decision-making details documented in ED Course.  Radiology: ordered. Decision-making details documented in ED  Course.  ECG/medicine tests: ordered. Decision-making details documented in ED Course.        Procedure  Procedures     Tee Castro MD  11/01/23 [id number]       Tee Castro MD  11/01/23 [id number]

## 2024-01-09 PROBLEM — U07.1 COVID: Status: RESOLVED | Noted: 2024-01-09 | Resolved: 2024-01-09

## 2024-01-09 PROBLEM — R11.2 NAUSEA AND VOMITING: Status: RESOLVED | Noted: 2022-10-12 | Resolved: 2024-01-09

## 2024-01-09 PROBLEM — R53.83 FATIGUE: Status: RESOLVED | Noted: 2024-01-09 | Resolved: 2024-01-09

## 2024-01-09 PROBLEM — M54.50 LOW BACK PAIN, UNSPECIFIED: Status: RESOLVED | Noted: 2022-06-15 | Resolved: 2024-01-09

## 2024-01-09 PROBLEM — S69.90XA: Status: RESOLVED | Noted: 2024-01-09 | Resolved: 2024-01-09

## 2024-01-09 RX ORDER — VALACYCLOVIR HYDROCHLORIDE 1 G/1
TABLET, FILM COATED ORAL
COMMUNITY
Start: 2020-07-29

## 2024-01-10 ENCOUNTER — APPOINTMENT (OUTPATIENT)
Dept: PRIMARY CARE | Facility: CLINIC | Age: 24
End: 2024-01-10
Payer: COMMERCIAL

## 2024-02-01 ENCOUNTER — TELEPHONE (OUTPATIENT)
Dept: SLEEP MEDICINE | Facility: HOSPITAL | Age: 24
End: 2024-02-01
Payer: COMMERCIAL

## 2024-02-01 NOTE — TELEPHONE ENCOUNTER
Spoke to patient about returning home sleep study kit. Patient voiced confirmation and stated she would return the kit today.

## 2024-02-29 ENCOUNTER — APPOINTMENT (OUTPATIENT)
Dept: CARDIOLOGY | Facility: HOSPITAL | Age: 24
End: 2024-02-29
Payer: COMMERCIAL

## 2024-02-29 ENCOUNTER — APPOINTMENT (OUTPATIENT)
Dept: RADIOLOGY | Facility: HOSPITAL | Age: 24
End: 2024-02-29
Payer: COMMERCIAL

## 2024-02-29 ENCOUNTER — HOSPITAL ENCOUNTER (OUTPATIENT)
Dept: CARDIOLOGY | Facility: HOSPITAL | Age: 24
Discharge: HOME | End: 2024-02-29
Payer: COMMERCIAL

## 2024-02-29 ENCOUNTER — HOSPITAL ENCOUNTER (EMERGENCY)
Facility: HOSPITAL | Age: 24
Discharge: HOME | End: 2024-02-29
Attending: INTERNAL MEDICINE
Payer: COMMERCIAL

## 2024-02-29 VITALS
BODY MASS INDEX: 29.45 KG/M2 | HEART RATE: 78 BPM | OXYGEN SATURATION: 98 % | RESPIRATION RATE: 16 BRPM | HEIGHT: 60 IN | SYSTOLIC BLOOD PRESSURE: 96 MMHG | DIASTOLIC BLOOD PRESSURE: 55 MMHG | WEIGHT: 150 LBS | TEMPERATURE: 98.5 F
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
## 2024-02-29 LAB
ABO GROUP (TYPE) IN BLOOD: NORMAL
ALBUMIN SERPL BCP-MCNC: 4.3 G/DL (ref 3.4–5)
ALP SERPL-CCNC: 69 U/L (ref 33–110)
ALT SERPL W P-5'-P-CCNC: 63 U/L (ref 7–45)
ANION GAP SERPL CALC-SCNC: 12 MMOL/L (ref 10–20)
ANTIBODY SCREEN: NORMAL
APTT PPP: 24 SECONDS (ref 27–38)
AST SERPL W P-5'-P-CCNC: 54 U/L (ref 9–39)
BASOPHILS # BLD AUTO: 0.02 X10*3/UL (ref 0–0.1)
BASOPHILS NFR BLD AUTO: 0.2 %
BILIRUB SERPL-MCNC: 0.2 MG/DL (ref 0–1.2)
BUN SERPL-MCNC: 12 MG/DL (ref 6–23)
CALCIUM SERPL-MCNC: 9.2 MG/DL (ref 8.6–10.3)
CARDIAC TROPONIN I PNL SERPL HS: 13 NG/L (ref 0–13)
CHLORIDE SERPL-SCNC: 105 MMOL/L (ref 98–107)
CO2 SERPL-SCNC: 23 MMOL/L (ref 21–32)
CREAT SERPL-MCNC: 0.76 MG/DL (ref 0.5–1.05)
EGFRCR SERPLBLD CKD-EPI 2021: >90 ML/MIN/1.73M*2
EOSINOPHIL # BLD AUTO: 0 X10*3/UL (ref 0–0.7)
EOSINOPHIL NFR BLD AUTO: 0 %
ERYTHROCYTE [DISTWIDTH] IN BLOOD BY AUTOMATED COUNT: 18.2 % (ref 11.5–14.5)
GLUCOSE BLD MANUAL STRIP-MCNC: 116 MG/DL (ref 74–99)
GLUCOSE SERPL-MCNC: 73 MG/DL (ref 74–99)
HCG UR QL IA.RAPID: NEGATIVE
HCT VFR BLD AUTO: 26.1 % (ref 36–46)
HGB BLD-MCNC: 7.5 G/DL (ref 12–16)
IMM GRANULOCYTES # BLD AUTO: 0.03 X10*3/UL (ref 0–0.7)
IMM GRANULOCYTES NFR BLD AUTO: 0.3 % (ref 0–0.9)
INR PPP: 1 (ref 0.9–1.1)
LYMPHOCYTES # BLD AUTO: 1.68 X10*3/UL (ref 1.2–4.8)
LYMPHOCYTES NFR BLD AUTO: 18.2 %
MCH RBC QN AUTO: 18.7 PG (ref 26–34)
MCHC RBC AUTO-ENTMCNC: 28.7 G/DL (ref 32–36)
MCV RBC AUTO: 65 FL (ref 80–100)
MONOCYTES # BLD AUTO: 0.47 X10*3/UL (ref 0.1–1)
MONOCYTES NFR BLD AUTO: 5.1 %
NEUTROPHILS # BLD AUTO: 7.01 X10*3/UL (ref 1.2–7.7)
NEUTROPHILS NFR BLD AUTO: 76.2 %
NRBC BLD-RTO: 0 /100 WBCS (ref 0–0)
PLATELET # BLD AUTO: 379 X10*3/UL (ref 150–450)
POTASSIUM SERPL-SCNC: 3.7 MMOL/L (ref 3.5–5.3)
PROT SERPL-MCNC: 8 G/DL (ref 6.4–8.2)
PROTHROMBIN TIME: 11.6 SECONDS (ref 9.8–12.8)
RBC # BLD AUTO: 4.02 X10*6/UL (ref 4–5.2)
RH FACTOR (ANTIGEN D): NORMAL
SODIUM SERPL-SCNC: 136 MMOL/L (ref 136–145)
WBC # BLD AUTO: 9.2 X10*3/UL (ref 4.4–11.3)

## 2024-02-29 PROCEDURE — 96366 THER/PROPH/DIAG IV INF ADDON: CPT

## 2024-02-29 PROCEDURE — 99285 EMERGENCY DEPT VISIT HI MDM: CPT | Mod: 25

## 2024-02-29 PROCEDURE — 86850 RBC ANTIBODY SCREEN: CPT

## 2024-02-29 PROCEDURE — 70480 CT ORBIT/EAR/FOSSA W/O DYE: CPT

## 2024-02-29 PROCEDURE — 96375 TX/PRO/DX INJ NEW DRUG ADDON: CPT

## 2024-02-29 PROCEDURE — 70480 CT ORBIT/EAR/FOSSA W/O DYE: CPT | Performed by: RADIOLOGY

## 2024-02-29 PROCEDURE — 36415 COLL VENOUS BLD VENIPUNCTURE: CPT

## 2024-02-29 PROCEDURE — 70450 CT HEAD/BRAIN W/O DYE: CPT | Performed by: RADIOLOGY

## 2024-02-29 PROCEDURE — 2550000001 HC RX 255 CONTRASTS: Performed by: INTERNAL MEDICINE

## 2024-02-29 PROCEDURE — 2500000002 HC RX 250 W HCPCS SELF ADMINISTERED DRUGS (ALT 637 FOR MEDICARE OP, ALT 636 FOR OP/ED)

## 2024-02-29 PROCEDURE — 84484 ASSAY OF TROPONIN QUANT: CPT | Performed by: INTERNAL MEDICINE

## 2024-02-29 PROCEDURE — 73090 X-RAY EXAM OF FOREARM: CPT | Mod: RIGHT SIDE | Performed by: RADIOLOGY

## 2024-02-29 PROCEDURE — 36415 COLL VENOUS BLD VENIPUNCTURE: CPT | Performed by: EMERGENCY MEDICINE

## 2024-02-29 PROCEDURE — 70498 CT ANGIOGRAPHY NECK: CPT

## 2024-02-29 PROCEDURE — 74177 CT ABD & PELVIS W/CONTRAST: CPT

## 2024-02-29 PROCEDURE — 71046 X-RAY EXAM CHEST 2 VIEWS: CPT

## 2024-02-29 PROCEDURE — 70450 CT HEAD/BRAIN W/O DYE: CPT | Mod: 59

## 2024-02-29 PROCEDURE — 70498 CT ANGIOGRAPHY NECK: CPT | Performed by: RADIOLOGY

## 2024-02-29 PROCEDURE — 73090 X-RAY EXAM OF FOREARM: CPT | Mod: RT

## 2024-02-29 PROCEDURE — 93005 ELECTROCARDIOGRAM TRACING: CPT

## 2024-02-29 PROCEDURE — 73090 X-RAY EXAM OF FOREARM: CPT | Mod: LEFT SIDE | Performed by: RADIOLOGY

## 2024-02-29 PROCEDURE — 82947 ASSAY GLUCOSE BLOOD QUANT: CPT | Mod: 59

## 2024-02-29 PROCEDURE — 71260 CT THORAX DX C+: CPT | Performed by: RADIOLOGY

## 2024-02-29 PROCEDURE — 96365 THER/PROPH/DIAG IV INF INIT: CPT

## 2024-02-29 PROCEDURE — 85025 COMPLETE CBC W/AUTO DIFF WBC: CPT | Performed by: EMERGENCY MEDICINE

## 2024-02-29 PROCEDURE — 2500000004 HC RX 250 GENERAL PHARMACY W/ HCPCS (ALT 636 FOR OP/ED)

## 2024-02-29 PROCEDURE — 81025 URINE PREGNANCY TEST: CPT | Performed by: EMERGENCY MEDICINE

## 2024-02-29 PROCEDURE — 73060 X-RAY EXAM OF HUMERUS: CPT | Mod: LT

## 2024-02-29 PROCEDURE — 96367 TX/PROPH/DG ADDL SEQ IV INF: CPT

## 2024-02-29 PROCEDURE — 80053 COMPREHEN METABOLIC PANEL: CPT | Performed by: EMERGENCY MEDICINE

## 2024-02-29 PROCEDURE — 74177 CT ABD & PELVIS W/CONTRAST: CPT | Performed by: RADIOLOGY

## 2024-02-29 PROCEDURE — 85610 PROTHROMBIN TIME: CPT

## 2024-02-29 PROCEDURE — 73090 X-RAY EXAM OF FOREARM: CPT | Mod: LT

## 2024-02-29 PROCEDURE — 94640 AIRWAY INHALATION TREATMENT: CPT

## 2024-02-29 PROCEDURE — 73060 X-RAY EXAM OF HUMERUS: CPT | Mod: LEFT SIDE | Performed by: RADIOLOGY

## 2024-02-29 RX ORDER — MORPHINE SULFATE 2 MG/ML
2 INJECTION, SOLUTION INTRAMUSCULAR; INTRAVENOUS ONCE
Status: COMPLETED | OUTPATIENT
Start: 2024-02-29 | End: 2024-02-29

## 2024-02-29 RX ORDER — MAGNESIUM SULFATE HEPTAHYDRATE 40 MG/ML
2 INJECTION, SOLUTION INTRAVENOUS ONCE
Status: COMPLETED | OUTPATIENT
Start: 2024-02-29 | End: 2024-02-29

## 2024-02-29 RX ORDER — IPRATROPIUM BROMIDE AND ALBUTEROL SULFATE 2.5; .5 MG/3ML; MG/3ML
3 SOLUTION RESPIRATORY (INHALATION) EVERY 20 MIN
Status: DISPENSED | OUTPATIENT
Start: 2024-02-29 | End: 2024-02-29

## 2024-02-29 RX ORDER — ACETAMINOPHEN 325 MG/1
650 TABLET ORAL EVERY 6 HOURS PRN
Qty: 30 TABLET | Refills: 0 | Status: SHIPPED | OUTPATIENT
Start: 2024-02-29

## 2024-02-29 RX ORDER — NAPROXEN 500 MG/1
500 TABLET ORAL
Qty: 30 TABLET | Refills: 0 | Status: SHIPPED | OUTPATIENT
Start: 2024-02-29 | End: 2024-03-15

## 2024-02-29 RX ORDER — LIDOCAINE 50 MG/G
1 PATCH TOPICAL DAILY
Qty: 10 PATCH | Refills: 0 | Status: SHIPPED | OUTPATIENT
Start: 2024-02-29

## 2024-02-29 RX ADMIN — METHYLPREDNISOLONE SODIUM SUCCINATE 125 MG: 125 INJECTION, POWDER, FOR SOLUTION INTRAMUSCULAR; INTRAVENOUS at 20:24

## 2024-02-29 RX ADMIN — IOHEXOL 90 ML: 350 INJECTION, SOLUTION INTRAVENOUS at 20:05

## 2024-02-29 RX ADMIN — MORPHINE SULFATE 2 MG: 2 INJECTION, SOLUTION INTRAMUSCULAR; INTRAVENOUS at 20:22

## 2024-02-29 RX ADMIN — DEXTROSE MONOHYDRATE 250 ML: 50 INJECTION, SOLUTION INTRAVENOUS at 20:55

## 2024-02-29 RX ADMIN — MAGNESIUM SULFATE HEPTAHYDRATE 2 G: 40 INJECTION, SOLUTION INTRAVENOUS at 20:23

## 2024-02-29 RX ADMIN — IPRATROPIUM BROMIDE AND ALBUTEROL SULFATE 3 ML: 2.5; .5 SOLUTION RESPIRATORY (INHALATION) at 20:37

## 2024-02-29 RX ADMIN — IPRATROPIUM BROMIDE AND ALBUTEROL SULFATE 3 ML: 2.5; .5 SOLUTION RESPIRATORY (INHALATION) at 20:27

## 2024-02-29 ASSESSMENT — PAIN DESCRIPTION - LOCATION: LOCATION: GENERALIZED

## 2024-02-29 ASSESSMENT — PAIN - FUNCTIONAL ASSESSMENT
PAIN_FUNCTIONAL_ASSESSMENT: 0-10
PAIN_FUNCTIONAL_ASSESSMENT: 0-10

## 2024-02-29 ASSESSMENT — PAIN SCALES - GENERAL: PAINLEVEL_OUTOF10: 10 - WORST POSSIBLE PAIN
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
## 2024-02-29 NOTE — Clinical Note
Collab and dispo with Dr Hunt for pts discharge home. Pt provided with discharge instructions, pt verbalized understanding. IV removed without complication. Pt ambulatory upon discharge.
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
## 2024-03-01 NOTE — DISCHARGE INSTRUCTIONS
Please return to the emergency department, should you have any worsening symptoms, are unable to get an appointment with your primary care physician within the discussed time frame, or have any further concerns.       Please follow up with:   Primary care physician as needed within the next few days    You make take ibuprofen or tylenol for pain. You may alternate ibuprofen and tylenol every 6 hours for better pain control.    Do not exceed 2400mg of ibuprofen or 4000mg of tylenol in a 24 hour period.
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
## 2024-03-02 LAB
ATRIAL RATE: 79 BPM
ATRIAL RATE: 92 BPM
P AXIS: 76 DEGREES
P AXIS: 81 DEGREES
P OFFSET: 197 MS
P OFFSET: 200 MS
P ONSET: 129 MS
P ONSET: 139 MS
PR INTERVAL: 172 MS
PR INTERVAL: 186 MS
Q ONSET: 222 MS
Q ONSET: 225 MS
QRS COUNT: 13 BEATS
QRS COUNT: 16 BEATS
QRS DURATION: 100 MS
QRS DURATION: 96 MS
QT INTERVAL: 354 MS
QT INTERVAL: 378 MS
QTC CALCULATION(BAZETT): 433 MS
QTC CALCULATION(BAZETT): 437 MS
QTC FREDERICIA: 408 MS
QTC FREDERICIA: 414 MS
R AXIS: 53 DEGREES
R AXIS: 60 DEGREES
T AXIS: 49 DEGREES
T AXIS: 64 DEGREES
T OFFSET: 402 MS
T OFFSET: 411 MS
VENTRICULAR RATE: 79 BPM
VENTRICULAR RATE: 92 BPM

## 2024-03-08 ENCOUNTER — HOSPITAL ENCOUNTER (EMERGENCY)
Facility: HOSPITAL | Age: 24
Discharge: HOME | End: 2024-03-08
Payer: COMMERCIAL

## 2024-03-08 VITALS
HEIGHT: 60 IN | RESPIRATION RATE: 14 BRPM | OXYGEN SATURATION: 98 % | SYSTOLIC BLOOD PRESSURE: 108 MMHG | TEMPERATURE: 98.4 F | WEIGHT: 154 LBS | HEART RATE: 74 BPM | BODY MASS INDEX: 30.23 KG/M2 | DIASTOLIC BLOOD PRESSURE: 62 MMHG

## 2024-03-08 DIAGNOSIS — Z97.3 USES CONTACT LENSES: Primary | ICD-10-CM

## 2024-03-08 PROCEDURE — 99283 EMERGENCY DEPT VISIT LOW MDM: CPT

## 2024-03-08 PROCEDURE — 2500000001 HC RX 250 WO HCPCS SELF ADMINISTERED DRUGS (ALT 637 FOR MEDICARE OP)

## 2024-03-08 RX ORDER — TETRACAINE HYDROCHLORIDE 5 MG/ML
1 SOLUTION OPHTHALMIC ONCE
Status: COMPLETED | OUTPATIENT
Start: 2024-03-08 | End: 2024-03-08

## 2024-03-08 RX ORDER — OFLOXACIN 3 MG/ML
1 SOLUTION/ DROPS OPHTHALMIC 4 TIMES DAILY
Qty: 5 ML | Refills: 0 | Status: SHIPPED | OUTPATIENT
Start: 2024-03-08 | End: 2024-03-15

## 2024-03-08 RX ADMIN — TETRACAINE HYDROCHLORIDE 1 DROP: 5 SOLUTION OPHTHALMIC at 09:25

## 2024-03-08 RX ADMIN — FLUORESCEIN SODIUM 1 STRIP: 1 STRIP OPHTHALMIC at 09:25

## 2024-03-08 ASSESSMENT — COLUMBIA-SUICIDE SEVERITY RATING SCALE - C-SSRS
6. HAVE YOU EVER DONE ANYTHING, STARTED TO DO ANYTHING, OR PREPARED TO DO ANYTHING TO END YOUR LIFE?: NO
2. HAVE YOU ACTUALLY HAD ANY THOUGHTS OF KILLING YOURSELF?: NO
1. IN THE PAST MONTH, HAVE YOU WISHED YOU WERE DEAD OR WISHED YOU COULD GO TO SLEEP AND NOT WAKE UP?: NO

## 2024-03-08 ASSESSMENT — VISUAL ACUITY
OS: 20/25
OD: 20/25
OU: 20/25

## 2024-03-08 ASSESSMENT — PAIN - FUNCTIONAL ASSESSMENT: PAIN_FUNCTIONAL_ASSESSMENT: 0-10

## 2024-03-08 ASSESSMENT — PAIN SCALES - GENERAL: PAINLEVEL_OUTOF10: 0 - NO PAIN
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
## 2024-03-08 NOTE — Clinical Note
Argelia Valentine was seen and treated in our emergency department on 3/8/2024.  She may return to work on 03/09/2024.       If you have any questions or concerns, please don't hesitate to call.      Jacoby Vargas PA-C

## 2024-03-08 NOTE — ED PROVIDER NOTES
HPI   Chief Complaint   Patient presents with    Eye Problem       24-year-old female past medical history of asthma presents the ED today with a chief concern of left contact stuck in eye.  Patient states that she got contacts for the first time yesterday and slept with them in her eyes.  She states that when she woke up in the morning she took her right contact out but she could not find the contact in her left eye.  She states that she is currently asymptomatic and does not feel that the contact is in her eye but is concerned about where it could have went.  She denies any fever/chills, nausea/vomiting.  Denies any eye pain.  Denies any blurry vision.  Denies any flashes or floaters.  Denies any double vision.  Denies vision changes.  Denies any numbness or weakness or tingling.  She has not tried anything at home for symptoms.  She has no other symptoms or concerns at this time.      History provided by:  Patient                      [place] Coma Scale Score: 15                     Patient History   Past Medical History:   Diagnosis Date    COVID 01/09/2024    Encounter for contraceptive management, unspecified 09/05/2018    Contraception management    Encounter for initial prescription of contraceptive pills 03/24/2016    Encounter for initial prescription of contraceptive pills    Encounter for initial prescription of injectable contraceptive 10/12/2020    Encounter for initial prescription of injectable contraceptive    Encounter for other specified surgical aftercare 10/01/2020    Postoperative visit    Encounter for other specified surgical aftercare 10/01/2020    Postoperative visit    Encounter for pregnancy test, result negative 09/13/2021    Negative pregnancy test    Encounter for pregnancy test, result positive     Positive pregnancy test    Encounter for removal of intrauterine contraceptive device 01/11/2017    Encounter for removal of intrauterine contraceptive device    Encounter for routine  postpartum follow-up 2018    Postpartum exam    Encounter for routine postpartum follow-up 2017    Postpartum examination following  delivery    Encounter for screening for infections with a predominantly sexual mode of transmission 11/15/2022    Screen for STD (sexually transmitted disease)    Encounter for supervision of normal pregnancy, unspecified, second trimester 2017    Second trimester pregnancy    Encounter for supervision of normal pregnancy, unspecified, second trimester 2017    Second trimester pregnancy    Encounter for supervision of normal pregnancy, unspecified, third trimester 2018    Third trimester pregnancy    Fatigue 2024    Gonococcal infection of lower genitourinary tract with periurethral and accessory gland abscess 2021    Infection of lower genitourinary tract with periurethral gland abscess due to Neisseria gonorrhea    Low back pain, unspecified 06/15/2022    Maternal care for unspecified type scar from previous  delivery     Previous  section complicating pregnancy    Nausea and vomiting 10/12/2022    Other acute postprocedural pain 2020    Postoperative pain    Other conditions influencing health status 2015    History of pregnancy    Personal history of other complications of pregnancy, childbirth and the puerperium 2018    History of postpartum depression    Personal history of other diseases of the female genital tract 2017    History of amenorrhea    Personal history of other infectious and parasitic diseases 2021    History of trichomonal vaginitis    Streptococcus, group b, as the cause of diseases classified elsewhere     Group beta Strep positive    Thumbnail injury 2024     Past Surgical History:   Procedure Laterality Date    OTHER SURGICAL HISTORY  10/27/2020    Dilation and curettage    OTHER SURGICAL HISTORY  10/27/2020     section     Family History   Problem  Relation Name Age of Onset    No Known Problems Mother      COPD Father      Stroke Sister      Hypertension Sister      Heart disease Sister       Social History     Tobacco Use    Smoking status: Some Days     Types: Cigarettes, Cigars    Smokeless tobacco: Never   Substance Use Topics    Alcohol use: Never    Drug use: Yes     Types: Marijuana       Physical Exam   ED Triage Vitals   Temperature Heart Rate Respirations BP   03/08/24 0906 03/08/24 0907 03/08/24 0907 03/08/24 0907   36.9 °C (98.4 °F) 74 14 108/62      Pulse Ox Temp Source Heart Rate Source Patient Position   03/08/24 0907 03/08/24 0906 -- --   98 % Oral        BP Location FiO2 (%)     -- --             Physical Exam  Gen.: Vitals noted no distress afebrile. Normal phonation, no stridor, no trismus. Patient is handling secretions well without any tripod positioning or drooling.  ENT: TMs clear bilaterally, EACs unremarkable. Mastoids nontender. Posterior oropharynx without erythema, exudate, or swelling. Uvula is in the midline and nonedematous. No Chuck's Angina.  EOMI without nystagmus.  PERRL.  No conjunctival injection.  No foreign body seen.  No hyphema or hypopyon.  Neck: Supple. No meningismus through full range of motion. No lymphadenopathy.   Cardiac: Regular rate rhythm no murmur.   Lungs: Good aeration throughout. No adventitious breath sounds.   Abdomen: Soft nontender nonsurgical throughout  Extremities: No peripheral edema. extremities are moist with good skin turgor.  Skin: No rash.   Neuro: No focal neurologic deficits. cranial nerves II-XII grossly intact.     ED Course & MDM   ED Course as of 03/08/24 1021   Fri Mar 08, 2024   1014 Lid eversion was performed.  No foreign bodies found   [MC]   1015 Dayron-Pen  Right eye: 24  Left eye: 24   [MC]   1015 Woods lamp  Fluorescein instilled in bilateral eyes.  No foreign bodies, dendritic lesions, corneal ulcerations or abrasions were found.  Negative seatbelt sign.  No hyphema or  hypopyon. []   1016 Visual acuity  Right eye: 20/25  Left eye: 20/25  Both eyes: 20/25 []      ED Course User Index  [] Jacoby Vargas PA-C         Diagnoses as of 03/08/24 1021   Uses contact lenses       Medical Decision Making  24-year-old female past medical history of asthma presents the ED today with a chief concern of concern for contact stuck in left eye.  Vital signs reassuring.  Patient overall appears well and is nontoxic-appearing.  Patient has full range of motion of the neck without any meningismus.  She satting well on room air.  No systemic signs or symptoms.  Visual acuity overall reassuring with 20/25 corrected in both eyes.  Lid eversion was performed no foreign bodies found.  No contact lens found.  Her Woods lamp examination reveals no ulcerations, abrasions, or dendritic lesions.  Negative Jan sign.  There is no hyphema or hypopyon.  Again visual exam is overall unremarkable.  Her Dayron-Pen examination is the same in both eyes.  I not concerned for any retained foreign body at this time.  Her contact probably fell out during her sleep.  Will treat with department any infection in the left eye with ofloxacin ophthalmic.  The left eye was irrigated with 500 mL of sterile water.  Patient felt better after irrigation.  Patient is asymptomatic prior to discharge.  Discussed with patient that she should use her glasses for 7 days.  She should follow-up with ophthalmology.  She should also follow-up with her PCP.  No tetanus indicated at this time.  Discussed impression and findings with patient she feels comfortable returning home.  We discussed very strict return precautions including returning for any new or worsening signs or symptoms.  Patient is in agreement this plan.  She will follow-up with her PCP within 3 days.  Again discussed strict return precautions.    Differential diagnosis: Retained foreign body, corneal ulcer, corneal abrasion, scleritis, episcleritis, conjunctivitis, acute  angle-closure glaucoma    Disposition/treatment  1.  Use of contact lenses    Shared decision-making was used patient feels comfortable returning home     Patient was advised to follow up with recommended provider in 1 day1 for another evaluation and exam. I advised patient/guardian to return or go to closest emergency room immediately if symptoms change, get worse, new symptoms develop prior to follow up. If there is no improvement in symptoms in the next 24 hours they are advised to return for further evaluation and exam. I also explained the plan and treatment course. Patient/guardian is in agreement with plan, treatment course, and follow up and states verbally that they will comply.    Homegoing. I discussed the differential; results and discharge plan with the patient and/or family/friend/caregiver if present.  I emphasized the importance of follow-up with the physician I referred them to in the timeframe recommended.  I explained reasons for the patient to return to the Emergency Department. They agreed that if they feel their condition is worsening or if they have any other concern they should call 911 immediately for further assistance. I gave the patient an opportunity to ask all questions they had and answered all of them accordingly. They understand return precautions and discharge instructions. The patient and/or family/friend/caregiver expressed understanding verbally and that they would comply.        This note has been transcribed using voice recognition and may contain grammatical errors, misplaced words, incorrect words, incorrect phrases or other errors.          Procedure  Procedures     Jacoby Vargas PA-C  03/08/24 1023       Jacoby Vargas PA-C  03/08/24 1023

## 2024-04-26 ENCOUNTER — TELEPHONE (OUTPATIENT)
Dept: SLEEP MEDICINE | Facility: HOSPITAL | Age: 24
End: 2024-04-26
Payer: COMMERCIAL

## 2024-04-26 DIAGNOSIS — G47.9 SLEEP DISTURBANCE: Primary | ICD-10-CM

## 2024-04-26 NOTE — TELEPHONE ENCOUNTER
Dear Ashley Vaz:    Thank you for referring your patient to a  Sleep Testing center for their home sleep apnea test (HSAT).     Your patient recently had an inconclusive HSAT due to poor quality recording.     American Academy of Sleep Medicine (AASM) Guidelines typically recommend an in-center, overnight sleep test after an inconclusive attempt at an HSAT. However, we can attempt an HSAT one more time if there are special circumstances.     Given that, would you like us to re-attempt the HSAT or would you like to order an in-lab sleep study?    If you would like an in-lab sleep study, please place the order.    If you would like a repeat HSAT, no new order is needed.    Please let us know how you would like us to proceed.    One of our caregivers will reach out to schedule your patient's in-lab sleep study once the order is placed.     Kind regards,  The  Sleep Medicine Team

## 2024-05-06 NOTE — OP NOTE
PREOPERATIVE DIAGNOSIS:  Pelvic pain.    POSTOPERATIVE DIAGNOSIS:  Pelvic pain.    OPERATION/PROCEDURE:  Diagnostic laparoscopy.    SURGEON:  Yefri Jensen MD.    ASSISTANT(S):    ANESTHESIA:  General.    PROCEDURE IN DETAIL:  Under satisfactory general anesthesia, the patient was placed in a  modified dorsal lithotomy position.  A combined abdominal, vaginal,  perineal prep was carried out.  The patient was draped in the usual  manner.  A supraumbilical incision was made.  A Veress needle was  introduced into the abdominal cavity without difficulty and was  insufflated with approximately 3 L of carbon dioxide gas.  Veress  needle was withdrawn.  Laparoscopic trocar was placed.  Examination  of the upper abdomen showed liver and gallbladder to be normal.  Appendix was normal.  Examination did show something retroperitoneal  in lower abdomen.  Surgical consult was obtained.  They felt this was  possibly hematoma, did not look fresh.  There were no signs of any  trauma.  Remainder of the case was then carried out.  Uterus was  normal.  Adnexa unremarkable.  Cul-de-sac was free.  There was no  evidence of infection or endometriosis.  The area which showed a  possible hematoma was reexamined and had not changed.  Per  recommendations of Surgery, the patient will be kept overnight and  hematocrit will be observed.  We do not believe that it was indicated  to open it at this point.  No obvious trauma was noted.  At this  point, gas allowed to escape.  Instruments were withdrawn.  Fascia  was closed with 2-0 Vicryl interrupted sutures.  Skin was closed with  subcuticular 4-0 Vicryl stitch.  Estimated blood loss was less than  10 cc.  The patient left to recovery room in good condition.       Yefri Jensen MD    DD:  08/30/2023 13:34:20 EST  DT:  08/30/2023 13:59:21 EST  DICTATION NUMBER:  [id number]  INTERNAL JOB NUMBER:  [id number]    CC:  Yefri Jensen MD, Fax: [phone]        Electronic Signatures:  Yefri Jensen  (MD) (Signed on 31-Aug-2023 08:07)   Authored  Unsigned, Draft (SYS GENERATED) (Entered on 30-Aug-2023 13:59)   Entered    Last Updated: 31-Aug-2023 08:07 by Yefri Jensen)

## 2024-05-20 ENCOUNTER — OFFICE VISIT (OUTPATIENT)
Dept: OBSTETRICS AND GYNECOLOGY | Facility: CLINIC | Age: 24
End: 2024-05-20
Payer: COMMERCIAL

## 2024-05-20 VITALS
HEIGHT: 60 IN | BODY MASS INDEX: 30 KG/M2 | DIASTOLIC BLOOD PRESSURE: 66 MMHG | SYSTOLIC BLOOD PRESSURE: 106 MMHG | WEIGHT: 152.8 LBS

## 2024-05-20 DIAGNOSIS — Z01.419 WOMEN'S ANNUAL ROUTINE GYNECOLOGICAL EXAMINATION: Primary | ICD-10-CM

## 2024-05-20 DIAGNOSIS — Z32.02 PREGNANCY TEST NEGATIVE: ICD-10-CM

## 2024-05-20 LAB — PREGNANCY TEST URINE, POC: NEGATIVE

## 2024-05-20 PROCEDURE — 4004F PT TOBACCO SCREEN RCVD TLK: CPT | Performed by: OBSTETRICS & GYNECOLOGY

## 2024-05-20 PROCEDURE — 81025 URINE PREGNANCY TEST: CPT | Performed by: OBSTETRICS & GYNECOLOGY

## 2024-05-20 PROCEDURE — 99395 PREV VISIT EST AGE 18-39: CPT | Performed by: OBSTETRICS & GYNECOLOGY

## 2024-05-20 RX ORDER — PRAZOSIN HYDROCHLORIDE 5 MG/1
CAPSULE ORAL
COMMUNITY
Start: 2024-04-02

## 2024-05-20 RX ORDER — ARIPIPRAZOLE LAUROXIL 662 MG/2.4ML
INJECTION, SUSPENSION, EXTENDED RELEASE INTRAMUSCULAR
COMMUNITY
Start: 2024-04-17

## 2024-05-20 RX ORDER — FLUOXETINE HYDROCHLORIDE 40 MG/1
CAPSULE ORAL
COMMUNITY
Start: 2024-04-02

## 2024-05-20 RX ORDER — ARIPIPRAZOLE 5 MG/1
5 TABLET ORAL
COMMUNITY
Start: 2024-04-05

## 2024-05-20 ASSESSMENT — ENCOUNTER SYMPTOMS
GASTROINTESTINAL NEGATIVE: 0
PSYCHIATRIC NEGATIVE: 0
MUSCULOSKELETAL NEGATIVE: 0
HEMATOLOGIC/LYMPHATIC NEGATIVE: 0
EYES NEGATIVE: 0
NEUROLOGICAL NEGATIVE: 0
RESPIRATORY NEGATIVE: 0
CONSTITUTIONAL NEGATIVE: 0
CARDIOVASCULAR NEGATIVE: 0
ENDOCRINE NEGATIVE: 0
ALLERGIC/IMMUNOLOGIC NEGATIVE: 0

## 2024-05-20 ASSESSMENT — PATIENT HEALTH QUESTIONNAIRE - PHQ9
2. FEELING DOWN, DEPRESSED OR HOPELESS: NOT AT ALL
SUM OF ALL RESPONSES TO PHQ9 QUESTIONS 1 AND 2: 0
1. LITTLE INTEREST OR PLEASURE IN DOING THINGS: NOT AT ALL

## 2024-05-20 ASSESSMENT — PAIN SCALES - GENERAL: PAINLEVEL: 0-NO PAIN

## 2024-05-20 NOTE — PROGRESS NOTES
Argelia Valentine is a 24 y.o. female who is here for a routine exam. PCP = TOM Mohamud  Patient for annual exam.  Patient is sexually active with steady partner.  She did take a Plan B approximately week ago.  However at this point patient notes that she does want to become pregnant and does not wish any birth control.    Review of Systems   Genitourinary:  Positive for menstrual problem.   All other systems reviewed and are negative.    Physical Exam  Constitutional:       Appearance: Normal appearance. She is normal weight.   Genitourinary:      Genitourinary Comments: External genitalia unremarkable  Vagina clear  Cervix closed uterus prominent 4 to 6 weeks size feels fixed anterior.  Adnexa masses or tenderness  Perineum without lesions or swelling    Breast without masses tenderness or nipple discharge, normal appearance   Breasts:     Breasts are soft.     Right: Normal.      Left: Normal.   HENT:      Head: Normocephalic.      Nose: Nose normal.   Eyes:      Pupils: Pupils are equal, round, and reactive to light.   Cardiovascular:      Rate and Rhythm: Normal rate and regular rhythm.   Pulmonary:      Effort: Pulmonary effort is normal.      Breath sounds: Normal breath sounds.   Abdominal:      General: Abdomen is flat. Bowel sounds are normal.      Palpations: Abdomen is soft.   Musculoskeletal:         General: Normal range of motion.      Cervical back: Normal range of motion and neck supple.   Neurological:      General: No focal deficit present.      Mental Status: She is alert.   Skin:     General: Skin is warm and dry.   Psychiatric:         Mood and Affect: Mood normal.         Behavior: Behavior normal.         Thought Content: Thought content normal.         Judgment: Judgment normal.         Objective   /66   Ht 1.524 m (5')   Wt 69.3 kg (152 lb 12.8 oz)   LMP 2024 (Exact Date)   BMI 29.84 kg/m²   OB History          4    Para   3    Term   2       1    AB   1     Living   3         SAB   1    IAB        Ectopic        Multiple        Live Births                      GynHx:  Menarche/Menopause: 10  Periods are regular every 28-30 days, lasting 4 days.  Dysmenorrhea: moderate, occurring first 1-2 days of flow.   Cyclic symptoms include none.    Social History     Substance and Sexual Activity   Sexual Activity Yes    Partners: Female, Male    Birth control/protection: None     Current contraception: None  STIs: none    Substance:   Tobacco Use: High Risk (5/20/2024)    Patient History     Smoking Tobacco Use: Some Days     Smokeless Tobacco Use: Never     Passive Exposure: Not on file      Social History     Substance and Sexual Activity   Drug Use Yes    Types: Marijuana      Social History     Substance and Sexual Activity   Alcohol Use Never     Abuse: No  Depression Screen:   History of depression currently on medication    Past med hx and past surg hx reviewed    Assessment/Plan      Currently unremarkable annual GYN exam.  Patient is sexually active with steady partner and wishes pregnancy at this point.  Does note she took a Plan B a week ago but is since changed her mind.  Discussed diet and exercise.  Patient under treatment for depression.  Discussed possible pregnancy in the future.  Discussed medications risks and benefits.  Discussed starting vitamins.  Patient will consider her options.  Return to office in 1 year or as needed

## 2024-05-24 ENCOUNTER — HOSPITAL ENCOUNTER (EMERGENCY)
Facility: HOSPITAL | Age: 24
Discharge: HOME | End: 2024-05-24
Payer: COMMERCIAL

## 2024-05-24 ENCOUNTER — APPOINTMENT (OUTPATIENT)
Dept: RADIOLOGY | Facility: HOSPITAL | Age: 24
End: 2024-05-24
Payer: COMMERCIAL

## 2024-05-24 VITALS
HEIGHT: 60 IN | WEIGHT: 154 LBS | OXYGEN SATURATION: 100 % | RESPIRATION RATE: 18 BRPM | TEMPERATURE: 98.6 F | HEART RATE: 80 BPM | BODY MASS INDEX: 30.23 KG/M2 | DIASTOLIC BLOOD PRESSURE: 71 MMHG | SYSTOLIC BLOOD PRESSURE: 116 MMHG

## 2024-05-24 DIAGNOSIS — M62.838 MUSCLE SPASM: ICD-10-CM

## 2024-05-24 DIAGNOSIS — S83.90XA SPRAIN OF KNEE, UNSPECIFIED LATERALITY, UNSPECIFIED LIGAMENT, INITIAL ENCOUNTER: Primary | ICD-10-CM

## 2024-05-24 LAB — PREGNANCY TEST URINE, POC: ABNORMAL

## 2024-05-24 PROCEDURE — 73562 X-RAY EXAM OF KNEE 3: CPT | Mod: RT

## 2024-05-24 PROCEDURE — 96372 THER/PROPH/DIAG INJ SC/IM: CPT | Performed by: PHYSICIAN ASSISTANT

## 2024-05-24 PROCEDURE — 2500000001 HC RX 250 WO HCPCS SELF ADMINISTERED DRUGS (ALT 637 FOR MEDICARE OP): Mod: SE | Performed by: PHYSICIAN ASSISTANT

## 2024-05-24 PROCEDURE — 81025 URINE PREGNANCY TEST: CPT | Performed by: PHYSICIAN ASSISTANT

## 2024-05-24 PROCEDURE — 99283 EMERGENCY DEPT VISIT LOW MDM: CPT

## 2024-05-24 PROCEDURE — 99284 EMERGENCY DEPT VISIT MOD MDM: CPT | Performed by: PHYSICIAN ASSISTANT

## 2024-05-24 PROCEDURE — 2500000004 HC RX 250 GENERAL PHARMACY W/ HCPCS (ALT 636 FOR OP/ED): Mod: SE | Performed by: PHYSICIAN ASSISTANT

## 2024-05-24 RX ORDER — ORPHENADRINE CITRATE 30 MG/ML
60 INJECTION INTRAMUSCULAR; INTRAVENOUS ONCE
Status: COMPLETED | OUTPATIENT
Start: 2024-05-24 | End: 2024-05-24

## 2024-05-24 RX ORDER — CYCLOBENZAPRINE HCL 10 MG
10 TABLET ORAL 2 TIMES DAILY PRN
Qty: 14 TABLET | Refills: 0 | Status: SHIPPED | OUTPATIENT
Start: 2024-05-24 | End: 2024-05-31

## 2024-05-24 RX ORDER — NAPROXEN 500 MG/1
500 TABLET ORAL
Qty: 14 TABLET | Refills: 0 | Status: SHIPPED | OUTPATIENT
Start: 2024-05-24 | End: 2024-05-31

## 2024-05-24 RX ORDER — NAPROXEN 500 MG/1
500 TABLET ORAL ONCE
Status: COMPLETED | OUTPATIENT
Start: 2024-05-24 | End: 2024-05-24

## 2024-05-24 RX ADMIN — ORPHENADRINE CITRATE 60 MG: 60 INJECTION INTRAMUSCULAR; INTRAVENOUS at 10:21

## 2024-05-24 RX ADMIN — NAPROXEN 500 MG: 500 TABLET ORAL at 10:22

## 2024-05-24 NOTE — ED PROVIDER NOTES
HPI   Chief Complaint   Patient presents with    Leg Pain       HPI: Patient is a 24-year-old female who presents to the ED for right knee pain that has been ongoing for the past week.  Patient states that she has pain in her posterior knee that radiates down into her calf and is worsened with ambulation.  She denies any recent trauma or falls that she can remember.  She denies any swelling of the knee or leg.  Denies any OCP use.  States that her pain significantly worsens with movement and makes it hard for her to play with her kids.  States that she woke up with in tears from her pain so she decided to come to the ED for evaluation.  She is able to ambulate.  Denies any numbness or tingling.  ------------------------------------------------------------------------------------------------------------------------------------------  ROS: a ten point review of systems was performed and was negative except as per HPI.  ------------------------------------------------------------------------------------------------------------------------------------------  PMH / PSH: as per HPI, otherwise reviewed   MEDS: as per HPI, otherwise reviewed in EMR  ALLERGIES: as per HPI, otherwise reviewed in EMR  SocH:  as per HPI, otherwise reviewed in EMR  FH:  as per HPI, otherwise reviewed in EMR   ------------------------------------------------------------------------------------------------------------------------------------------  Physical Exam:  VS: As documented in the triage note and EMR flowsheet from this visit was reviewed  General: Well appearing. No acute distress.   Eyes:  Extraocular movements grossly intact. No scleral icterus.   Head: Atraumatic. Normocephalic.     Neck: No meningismus. No gross masses. Full movement through range of motion  CV: Regular rhythm. No murmurs, rubs, gallops appreciated.   Resp: Clear to auscultation bilaterally. No respiratory distress.    MSK: Symmetric muscle bulk. No gross step offs or  deformities. Tenderness to the posterior knee joint.  Negative Lachman's.  No joint laxity with valgus, varus stress testing.  No effusion.  No tenderness to the gastrocnemius muscle on the right.  Negative Homans' sign.  No palpable cords.  No peripheral edema.  Skin: Warm, dry. No rashes  Neuro: CN II-VII intact. A&O x3. Speech fluent. Alert. Moving all extremities. Ambulates with normal gait  Psych: Appropriate mood and affect for situation  ------------------------------------------------------------------------------------------------------------------------------------------  Hospital Course / Medical Decision Making: Patient is a 24-year-old female who presents to the ED for posterior right knee pain and calf pain that is been ongoing for a week.  On examination, patient is well-appearing.  Vitals are stable.  Denies any numbness, tingling.  Denies any OCP use.  Denies any trauma, injury or falls.  On exam, there is no peripheral edema.  No redness or effusion of the knee joint.  Negative Homans' sign.  No palpable cords.  No laxity on stress testing.  No concern for any DVT.  Patient administered naproxen and Norflex.  Differential diagnosis includes but is not limited to muscle spasm versus knee sprain versus internal derangement of the right knee.  X-ray of the right knee obtained and is negative.  On reassessment, patient feeling improved after medication here.  Suspect knee sprain versus muscle spasm.  Patient written prescriptions for NSAIDs and muscle relaxers.  Administered an Ace bandage.  Advised on rest, ice, elevation and NSAID use.  Advised to follow-up with orthopedics in 1 week should her symptoms persist. Patient has remained hemodynamically stable throughout the course of their ED stay.  Patient is home-going.  Patient advised to return to the ED for any worsening symptoms.  Advised to follow-up with PCP.  Patient was discharged in stable condition.                              [place] Coma  Scale Score: 15                     Patient History   Past Medical History:   Diagnosis Date    COVID 2024    Encounter for contraceptive management, unspecified 2018    Contraception management    Encounter for initial prescription of contraceptive pills 2016    Encounter for initial prescription of contraceptive pills    Encounter for initial prescription of injectable contraceptive 10/12/2020    Encounter for initial prescription of injectable contraceptive    Encounter for other specified surgical aftercare 10/01/2020    Postoperative visit    Encounter for other specified surgical aftercare 10/01/2020    Postoperative visit    Encounter for pregnancy test, result negative 2021    Negative pregnancy test    Encounter for pregnancy test, result positive ([facility])     Positive pregnancy test    Encounter for removal of intrauterine contraceptive device 2017    Encounter for removal of intrauterine contraceptive device    Encounter for routine postpartum follow-up ([facility]) 2018    Postpartum exam    Encounter for routine postpartum follow-up ([facility]) 2017    Postpartum examination following  delivery    Encounter for screening for infections with a predominantly sexual mode of transmission 11/15/2022    Screen for STD (sexually transmitted disease)    Encounter for supervision of normal pregnancy, unspecified, second trimester ([facility]) 2017    Second trimester pregnancy    Encounter for supervision of normal pregnancy, unspecified, second trimester ([facility]) 2017    Second trimester pregnancy    Encounter for supervision of normal pregnancy, unspecified, third trimester ([facility]) 2018    Third trimester pregnancy    Fatigue 2024    Gonococcal infection of lower genitourinary tract with periurethral and accessory gland abscess 2021    Infection of lower genitourinary tract with periurethral gland abscess due to Neisseria gonorrhea     Low back pain, unspecified 06/15/2022    Maternal care for unspecified type scar from previous  delivery ([facility])     Previous  section complicating pregnancy    Nausea and vomiting 10/12/2022    Other acute postprocedural pain 2020    Postoperative pain    Other conditions influencing health status 2015    History of pregnancy    Personal history of other complications of pregnancy, childbirth and the puerperium 2018    History of postpartum depression    Personal history of other diseases of the female genital tract 2017    History of amenorrhea    Personal history of other infectious and parasitic diseases 2021    History of trichomonal vaginitis    Streptococcus, group b, as the cause of diseases classified elsewhere     Group beta Strep positive    Thumbnail injury 2024     Past Surgical History:   Procedure Laterality Date    OTHER SURGICAL HISTORY  10/27/2020    Dilation and curettage    OTHER SURGICAL HISTORY  10/27/2020     section     Family History   Problem Relation Name Age of Onset    No Known Problems Mother      COPD Father      Stroke Sister      Hypertension Sister      Heart disease Sister       Social History     Tobacco Use    Smoking status: Some Days     Types: Cigarettes, Cigars    Smokeless tobacco: Never    Tobacco comments:     Infrequent smoker.  Patient notes is not a problem   Vaping Use    Vaping status: Never Used   Substance Use Topics    Alcohol use: Never    Drug use: Yes     Types: Marijuana       Physical Exam   ED Triage Vitals [24 0937]   Temperature Heart Rate Respirations BP   37 °C (98.6 °F) 80 18 116/71      Pulse Ox Temp Source Heart Rate Source Patient Position   100 % Temporal -- --      BP Location FiO2 (%)     -- --       Physical Exam    ED Course & MDM   Diagnoses as of 24 1056   Sprain of knee, unspecified laterality, unspecified ligament, initial encounter   Muscle spasm       Medical Decision  Making      Procedure  Procedures     [place] PA-C  05/24/24 105

## 2024-05-24 NOTE — ED TRIAGE NOTES
Patient came to ED with complaint of right leg pain x 1 week. Patient states she feels a pulling in her calf when she walks. Patient denies injury/trauma. Pt reporting it has gotten worse over last week. No additional symptoms. No PMH.

## 2024-06-04 ENCOUNTER — OFFICE VISIT (OUTPATIENT)
Dept: ORTHOPEDIC SURGERY | Facility: CLINIC | Age: 24
End: 2024-06-04
Payer: COMMERCIAL

## 2024-06-04 DIAGNOSIS — M22.2X1 PATELLOFEMORAL PAIN SYNDROME OF BOTH KNEES: Primary | ICD-10-CM

## 2024-06-04 DIAGNOSIS — M25.562 CHRONIC PAIN OF LEFT KNEE: ICD-10-CM

## 2024-06-04 DIAGNOSIS — G89.29 CHRONIC PAIN OF LEFT KNEE: ICD-10-CM

## 2024-06-04 DIAGNOSIS — M22.2X2 PATELLOFEMORAL PAIN SYNDROME OF BOTH KNEES: Primary | ICD-10-CM

## 2024-06-04 PROCEDURE — 4004F PT TOBACCO SCREEN RCVD TLK: CPT | Performed by: FAMILY MEDICINE

## 2024-06-04 PROCEDURE — 99204 OFFICE O/P NEW MOD 45 MIN: CPT | Performed by: FAMILY MEDICINE

## 2024-06-04 PROCEDURE — L1812 KO ELASTIC W/JOINTS PRE OTS: HCPCS | Performed by: FAMILY MEDICINE
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
## 2024-06-23 ENCOUNTER — HOSPITAL ENCOUNTER (EMERGENCY)
Facility: HOSPITAL | Age: 24
Discharge: HOME | End: 2024-06-23
Payer: COMMERCIAL

## 2024-06-23 VITALS
HEART RATE: 80 BPM | SYSTOLIC BLOOD PRESSURE: 101 MMHG | TEMPERATURE: 98.1 F | BODY MASS INDEX: 30.23 KG/M2 | OXYGEN SATURATION: 100 % | DIASTOLIC BLOOD PRESSURE: 63 MMHG | HEIGHT: 60 IN | WEIGHT: 154 LBS | RESPIRATION RATE: 17 BRPM

## 2024-06-23 DIAGNOSIS — K08.89 PAIN, DENTAL: Primary | ICD-10-CM

## 2024-06-23 PROCEDURE — 99283 EMERGENCY DEPT VISIT LOW MDM: CPT

## 2024-06-23 PROCEDURE — 2500000001 HC RX 250 WO HCPCS SELF ADMINISTERED DRUGS (ALT 637 FOR MEDICARE OP): Performed by: PHYSICIAN ASSISTANT

## 2024-06-23 RX ORDER — ACETAMINOPHEN 500 MG
1000 TABLET ORAL EVERY 8 HOURS PRN
Qty: 42 TABLET | Refills: 0 | Status: SHIPPED | OUTPATIENT
Start: 2024-06-23 | End: 2024-06-30

## 2024-06-23 RX ORDER — AMOXICILLIN AND CLAVULANATE POTASSIUM 875; 125 MG/1; MG/1
1 TABLET, FILM COATED ORAL ONCE
Status: COMPLETED | OUTPATIENT
Start: 2024-06-23 | End: 2024-06-23

## 2024-06-23 RX ORDER — IBUPROFEN 600 MG/1
600 TABLET ORAL EVERY 6 HOURS PRN
Qty: 20 TABLET | Refills: 0 | Status: SHIPPED | OUTPATIENT
Start: 2024-06-23 | End: 2024-06-28

## 2024-06-23 RX ORDER — AMOXICILLIN AND CLAVULANATE POTASSIUM 875; 125 MG/1; MG/1
1 TABLET, FILM COATED ORAL 2 TIMES DAILY
Qty: 28 TABLET | Refills: 0 | Status: SHIPPED | OUTPATIENT
Start: 2024-06-23 | End: 2024-07-07

## 2024-06-23 RX ORDER — OXYCODONE AND ACETAMINOPHEN 5; 325 MG/1; MG/1
1 TABLET ORAL ONCE
Status: COMPLETED | OUTPATIENT
Start: 2024-06-23 | End: 2024-06-23

## 2024-06-23 ASSESSMENT — PAIN - FUNCTIONAL ASSESSMENT: PAIN_FUNCTIONAL_ASSESSMENT: 0-10

## 2024-06-23 ASSESSMENT — PAIN SCALES - PAIN ASSESSMENT IN ADVANCED DEMENTIA (PAINAD): TOTALSCORE: MEDICATION (SEE MAR)

## 2024-06-23 ASSESSMENT — PAIN SCALES - GENERAL
PAINLEVEL_OUTOF10: 10 - WORST POSSIBLE PAIN
PAINLEVEL_OUTOF10: 9

## 2024-06-23 ASSESSMENT — COLUMBIA-SUICIDE SEVERITY RATING SCALE - C-SSRS
6. HAVE YOU EVER DONE ANYTHING, STARTED TO DO ANYTHING, OR PREPARED TO DO ANYTHING TO END YOUR LIFE?: NO
1. IN THE PAST MONTH, HAVE YOU WISHED YOU WERE DEAD OR WISHED YOU COULD GO TO SLEEP AND NOT WAKE UP?: NO
2. HAVE YOU ACTUALLY HAD ANY THOUGHTS OF KILLING YOURSELF?: NO

## 2024-06-23 ASSESSMENT — PAIN DESCRIPTION - LOCATION: LOCATION: OTHER (COMMENT)

## 2024-06-23 NOTE — ED TRIAGE NOTES
Right side dental pain for about 1 wk, pt states she hasn't been to the dentist in ten years. Pain radiates to her ear and also causing her headache. No fevers, chills.

## 2024-06-23 NOTE — ED PROVIDER NOTES
HPI     CC: No chief complaint on file.     HPI: Argelia Valentine is a 24 y.o. female with past medical history of PTSD, asthma, and migraines presents with concern for dental pain for the past week.  Patient reports that anytime cold air or cold liquids hits the tooth, she is in excruciating pain that radiates to the right ear.  She has not made an appointment with the dentist that she is fearful.  Last time she saw dentist was 10 years ago in 2014.  She reports no fevers or chills which is significant pain.  She has tried Advil without relief.  Denies neck pain or swelling.  Denies chance of pregnancy.  No other Acute complaints.    ROS: 10-point review of systems was performed and is otherwise negative except as noted in HPI.      Past Medical History: Noncontributory except per HPI     Past Surgical History: Noncontributory except per HPI     Family History: Reviewed and noncontributory     Social History:  Noncontributory except per HPI       Allergies   Allergen Reactions    Sumatriptan Anaphylaxis       Home Meds:   Current Outpatient Medications   Medication Instructions    acetaminophen (TYLENOL) 1,000 mg, oral, Every 8 hours PRN    albuterol 90 mcg/actuation inhaler 2 puffs, inhalation, Every 4 hours PRN    amoxicillin-pot clavulanate (Augmentin) 875-125 mg tablet 1 tablet, oral, 2 times daily    ARIPiprazole (ABILIFY) 5 mg, oral, Daily before breakfast    Aristada 662 mg/2.4 mL injection     cholecalciferol (VITAMIN D3) 50 mcg, oral, Daily    cyclobenzaprine (FLEXERIL) 10 mg, oral, 2 times daily PRN    Dulera 100-5 mcg/actuation inhaler 2 puffs, inhalation, 2 times daily    DULoxetine (CYMBALTA) 60 mg, oral, Daily    ergocalciferol (VITAMIN D-2) 50 mcg, oral, Daily    escitalopram (LEXAPRO) 10 mg, oral, Daily before breakfast    ferrous sulfate 325 (65 Fe) MG tablet 65 mg of iron, oral, 2 times daily    ferrous sulfate 325 mg, oral, 2 times daily, Do not crush, chew, or split.    FLUoxetine (PROzac) 40 mg  capsule     ibuprofen 600 mg, oral, Every 6 hours PRN    lidocaine (Lidoderm) 5 % patch 1 patch, transdermal, Daily, Remove & discard patch within 12 hours or as directed by MD.    norethindrone (AYGESTIN) 5 mg, oral, Daily    prazosin (Minipress) 5 mg capsule     valACYclovir (Valtrex) 1 gram tablet oral        ED Triage Vitals   Temperature Heart Rate Respirations BP   06/23/24 0329 06/23/24 0329 06/23/24 0329 06/23/24 0331   36.7 °C (98.1 °F) 82 17 110/59      Pulse Ox Temp Source Heart Rate Source Patient Position   06/23/24 0329 06/23/24 0329 -- 06/23/24 0329   100 % Temporal  Sitting      BP Location FiO2 (%)     06/23/24 0329 --     Right arm          Heart Rate:  [80-82]   Temperature:  [36.7 °C (98.1 °F)]   Respirations:  [17]   BP: (101-110)/(59-63)   Height:  [152.4 cm (5')]   Weight:  [69.9 kg (154 lb)]   Pulse Ox:  [100 %]      Physical Exam:  Physical Exam  Vitals and nursing note reviewed.   Constitutional:       General: She is not in acute distress.     Appearance: Normal appearance. She is not ill-appearing.   HENT:      Head: Normocephalic and atraumatic.      Right Ear: External ear normal.      Left Ear: External ear normal.      Nose: Nose normal.      Mouth/Throat:      Mouth: Mucous membranes are moist.        Comments: Some pain to both of the above teeth without obvious dental caries or abscess.  No external facial pain or swelling.  No submandibular pain or swelling.  Bilateral TMs clear.  Eyes:      Extraocular Movements: Extraocular movements intact.      Conjunctiva/sclera: Conjunctivae normal.      Pupils: Pupils are equal, round, and reactive to light.   Cardiovascular:      Rate and Rhythm: Normal rate and regular rhythm.      Pulses: Normal pulses.   Pulmonary:      Effort: Pulmonary effort is normal. No respiratory distress.      Breath sounds: Normal breath sounds.   Abdominal:      General: Abdomen is flat.      Palpations: Abdomen is soft.      Tenderness: There is no abdominal  tenderness.   Musculoskeletal:         General: Normal range of motion.      Cervical back: Normal range of motion and neck supple.   Skin:     General: Skin is warm and dry.      Capillary Refill: Capillary refill takes less than 2 seconds.   Neurological:      General: No focal deficit present.      Mental Status: She is alert and oriented to person, place, and time.   Psychiatric:         Mood and Affect: Mood normal.          Diagnostic Results        Labs Reviewed - No data to display      No orders to display                 [place] Coma Scale Score: 15                  Procedure  Procedures    ED Course & MDM   Assessment/Plan:     Medications   oxyCODONE-acetaminophen (Percocet) 5-325 mg per tablet 1 tablet (1 tablet oral Given 6/23/24 0504)   amoxicillin-pot clavulanate (Augmentin) 875-125 mg per tablet 1 tablet (1 tablet oral Given 6/23/24 0504)        Diagnoses as of 06/23/24 0552   Pain, dental       Medical Decision Making    Argelia Valentine is a 24 y.o. female with past medical history of PTSD, asthma, and migraines presents with concern for dental pain for the past week.  Patient is nontoxic-appearing and vital signs are normal.  Based on symptoms and presentation, differential diagnosis includes dental carry, dental abscess, gingivitis, or exposed nerve root.  Low suspicion for deep dental infection, neck infection, or other abscess.  Will give the patient a dose of pain medication in the emergency department with plan for follow-up outpatient.  Also give dose of Augmentin in the emergency department due to the time of day.  No advanced imaging required at this time.    Dental pain - I discussed the diagnosis of dental pain with the patient. We discussed the need for seeing a dentist as soon as possible for definitive management. I have prescribed Ibuprofen and Tylenol for pain control. I have given Augmentin and the patient was encouraged to take this in its entirety.  We also discussed that she can use  an over-the-counter cavity filler kit to place over the top surface of the tooth to provide some more pain relief until she can see a dentist.  She is agreeable to seeing a dentist even though she is fearful.  Some resources were provided in her discharge paperwork.  Advised to return to the ED should they experience new or worsening symptoms including but not limited to fever, chills, worsening pain, drainage from tooth, or any new symptoms. Patient was agreeable to discharge home.       Disposition: Home    ED Prescriptions       Medication Sig Dispense Start Date End Date Auth. Provider    amoxicillin-pot clavulanate (Augmentin) 875-125 mg tablet Take 1 tablet by mouth 2 times a day for 14 days. 28 tablet 6/23/2024 7/7/2024 Muna Miramontes PA-C    acetaminophen (Tylenol) 500 mg tablet Take 2 tablets (1,000 mg) by mouth every 8 hours if needed for mild pain (1 - 3) for up to 7 days. 42 tablet 6/23/2024 6/30/2024 Muna Miramontes PA-C    ibuprofen 600 mg tablet Take 1 tablet (600 mg) by mouth every 6 hours if needed for moderate pain (4 - 6) for up to 5 days. 20 tablet 6/23/2024 6/28/2024 Muna Miramontes PA-C            Social Determinants Affecting Care: none     Muna Miramontes PA-C    This note was dictated by speech recognition. Minor errors in transcription may be present.     Muna Miramontes PA-C  06/23/24 0552

## 2024-06-25 ENCOUNTER — PROCEDURE VISIT (OUTPATIENT)
Dept: SLEEP MEDICINE | Facility: CLINIC | Age: 24
End: 2024-06-25
Payer: COMMERCIAL

## 2024-06-25 DIAGNOSIS — G47.9 SLEEP DISTURBANCE: ICD-10-CM

## 2024-06-25 PROCEDURE — 95810 POLYSOM 6/> YRS 4/> PARAM: CPT | Performed by: PSYCHIATRY & NEUROLOGY

## 2024-06-26 VITALS
SYSTOLIC BLOOD PRESSURE: 102 MMHG | HEIGHT: 63 IN | DIASTOLIC BLOOD PRESSURE: 66 MMHG | BODY MASS INDEX: 27.97 KG/M2 | OXYGEN SATURATION: 99 % | RESPIRATION RATE: 20 BRPM | WEIGHT: 157.85 LBS

## 2024-06-26 NOTE — PROGRESS NOTES
[facility] TECH NOTE:     Patient: Argelia Valentine   MRN//AGE: [id number]  2000  24 y.o.   Technologist: MARY ANN KAUFMAN   Room: 440B   Service Date: 2024        Sleep Testing Location: [facility]:     TECHNOLOGIST SLEEP STUDY PROCEDURE NOTE:   This sleep study is being conducted according to the policies and procedures outlined by the [employer] accreditation standards.  The sleep study procedure and processes involved during this appointment was explained to the patient/patient’s family, questions were answered. The patient/family verbalized understanding.      The patient is a 24 y.o. year old female scheduled for aDiagnostic PSG Split night with montage of:   PSG Master . she arrived for her appointment.      The study that was ultimately completed was aDiagnostic PSG Split night with montage of:   PSG Master .    The full study Was completed.  Patient questionnaires completed?: yes     Consents signed? yes    Initial Fall Risk Screening:     Argelia has not fallen in the last 6 months. her did not result in injury. Argelia does not have a fear of falling. He does not need assistance with sitting, standing, or walking. she does not need assistance walking in her home. she does not need assistance in an unfamiliar setting. The patient is notusing an assistive device.     Brief Study observations: Arousals, desaturations, respiratory events and snoring observed.      Deviation to order/protocol and reason: None     If PAP, which was preferred mask/pressure/mode: None    Other:None    After the procedure, the patient/family was informed to ensure followup with ordering clinician for testing results.      Technologist: MARY ANN KAUFMAN

## 2024-07-10 ENCOUNTER — APPOINTMENT (OUTPATIENT)
Dept: PHYSICAL THERAPY | Facility: CLINIC | Age: 24
End: 2024-07-10
Payer: COMMERCIAL

## 2024-07-10 ENCOUNTER — EVALUATION (OUTPATIENT)
Dept: PHYSICAL THERAPY | Facility: CLINIC | Age: 24
End: 2024-07-10
Payer: COMMERCIAL

## 2024-07-10 DIAGNOSIS — M25.562 BILATERAL KNEE PAIN: Primary | ICD-10-CM

## 2024-07-10 DIAGNOSIS — M22.2X1 PATELLOFEMORAL PAIN SYNDROME OF BOTH KNEES: ICD-10-CM

## 2024-07-10 DIAGNOSIS — M22.2X2 PATELLOFEMORAL PAIN SYNDROME OF BOTH KNEES: ICD-10-CM

## 2024-07-10 DIAGNOSIS — M25.561 BILATERAL KNEE PAIN: Primary | ICD-10-CM

## 2024-07-10 PROCEDURE — 97110 THERAPEUTIC EXERCISES: CPT | Mod: GP

## 2024-07-10 PROCEDURE — 97161 PT EVAL LOW COMPLEX 20 MIN: CPT | Mod: GP

## 2024-07-10 ASSESSMENT — ENCOUNTER SYMPTOMS
OCCASIONAL FEELINGS OF UNSTEADINESS: 0
DEPRESSION: 0
LOSS OF SENSATION IN FEET: 0

## 2024-07-10 ASSESSMENT — PAIN SCALES - GENERAL: PAINLEVEL_OUTOF10: 7

## 2024-07-10 ASSESSMENT — PAIN - FUNCTIONAL ASSESSMENT: PAIN_FUNCTIONAL_ASSESSMENT: 0-10
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
## 2024-07-16 ENCOUNTER — APPOINTMENT (OUTPATIENT)
Dept: PHYSICAL THERAPY | Facility: CLINIC | Age: 24
End: 2024-07-16
Payer: COMMERCIAL

## 2024-07-22 ENCOUNTER — TREATMENT (OUTPATIENT)
Dept: PHYSICAL THERAPY | Facility: CLINIC | Age: 24
End: 2024-07-22
Payer: COMMERCIAL

## 2024-07-22 ENCOUNTER — DOCUMENTATION (OUTPATIENT)
Dept: PHYSICAL THERAPY | Facility: CLINIC | Age: 24
End: 2024-07-22
Payer: COMMERCIAL

## 2024-07-22 DIAGNOSIS — M22.2X1 PATELLOFEMORAL PAIN SYNDROME OF BOTH KNEES: ICD-10-CM

## 2024-07-22 DIAGNOSIS — M22.2X2 PATELLOFEMORAL PAIN SYNDROME OF BOTH KNEES: ICD-10-CM

## 2024-07-22 DIAGNOSIS — M25.562 BILATERAL KNEE PAIN: ICD-10-CM

## 2024-07-22 DIAGNOSIS — M25.561 BILATERAL KNEE PAIN: ICD-10-CM

## 2024-07-22 PROCEDURE — 97110 THERAPEUTIC EXERCISES: CPT | Mod: GP

## 2024-07-22 NOTE — PROGRESS NOTES
Physical Therapy    Physical Therapy    Physical Therapy follow    Patient Name: Argelia Valentine  MRN: [id number]  Today's Date: 7/22/2024     PT Therapeutic Procedures Time Entry  Therapeutic Exercise Time Entry: 40                                    Visit #2  Insurance; [facility] (authorization required)  Problem List Items Addressed This Visit             ICD-10-CM       Musculoskeletal and Injuries    Bilateral knee pain M25.561, M25.562    Patellofemoral pain syndrome of both knees M22.2X1, M22.2X2       Assessment; Patient presents with pain in both knees, with right knee being more acute with onset in June of this year. No known reason for pain. PFS is suspected. Quads weakness evident by MMT. Bilateral knees end range of extension and flexion producing pain. All exercise activity have been stopped due to pain. Standing, playing, running, climbing stairs, squatting is painful. Therex required for strengthening and correction of forces effecting left/right patella. Patient will benefit from skilled PT, demonstrates motivation to address problem and resume ability to function and play with young kids, free of pain.   7/22/2024;  Closed chain there ex instructed and understood with minor body mechanics corrections. No pain exacerbation reported.        Plan; patient education, there ex configuration, manual therapy as needed        Current Problem  1. Patellofemoral pain syndrome of both knees  Follow Up In Physical Therapy      2. Bilateral knee pain  Follow Up In Physical Therapy          Subjective   General:     Precautions:     Vital Signs:     Pain:     Home Living: lives in house with multiple stairs, taking care of two young kids     Prior Function Per Pt/Caregiver Report: independent        Objective   Posture: WNL     Range of Motion: Left knee 0-128 AROM/ 0-135 PROM with empty end feel                 Right knee 0-120 AROM/ 0-130 PROM with empty end feel     Strength: Right VMO 4-/5                   Left  VMO 4-/5  Hip flexors and Quads 4/5 bilateral  Hip adductors 4-/5 bilaterally  Hip abductors 4+/5 bilaterally  Hip extensors 5/5  Hamstrings 5-/5     Flexibility: lacking full bilateral Hamstrings and Quads flexibility, with right side tighter and more painful     Palpation: tenderness and pain of medial patellar retinaculum, bilaterally      Special Tests: Negative Anterior/Posterior Drawer's tests and tests for collateral ligaments laxity. Pain produce with medial valgus forces bilaterally. Pain located medially along joint line - medial patellar retinaculum   Pain produced with lateral shifts and pull of patella left/right  Pain produced with superior patellar pull bilaterally      Gait: normal gait on level surface, with reported avoidance of hyperextension of knees due to more intense pain    Outcome Measures:  LEFS = 43/80     OP EDUCATION:       PT intervention;  SLRs; (recommended 3 x 10 per leg)  Supine Hamstrings stretches with strap; 30 seconds x 3 each leg  Prone Quads stretches with strap; 30 seconds x 3 each leg  TKE with TB resistance; 3 x 10 each leg  Side steps with black TB resistance  Modified squats  Side step ups on 4 inch step    Goals:  Active       PT Problem       PT Goal 1       Start:  07/10/24    Expected End:  09/27/24       Patient will be able to demonstrated and report improved functional ambulation with different daily tasks and/or recreational activities, as evident by LEFS of 60 or higher          PT Goal 2       Start:  07/10/24       Patient will report reduced/abolished pain in bilateral knees, indicated by grade of 0-2 on 0-10 pain scale with most demanding activities          PT Goal 3       Start:  07/10/24       Patient will demonstrated improved strength of bilateral Quads, hip flexors, Gluteals, Medial Hamstrings and other adductors, evident by MMT of 5-/5, and functionally evident by easy of squatting, jogging, stair climbing, etc          PT Goal 4       Start:  07/10/24        Patient will demonstrated knowledge of HEP, its maintenance frequency, and associated benefits

## 2024-07-22 NOTE — PROGRESS NOTES
Physical Therapy                 Therapy Communication Note    Patient Name: Argelia Valentine  MRN: [id number]  Today's Date: 7/22/2024     Discipline: Physical Therapy    Missed Visit Reason:      Missed Time: No Show    Comment: VM left with patient reminder her of missed 8 am visit, and reviewing cancel/no show policy

## 2024-07-23 ENCOUNTER — HOSPITAL ENCOUNTER (EMERGENCY)
Facility: HOSPITAL | Age: 24
Discharge: HOME | End: 2024-07-23
Payer: COMMERCIAL

## 2024-07-23 VITALS
BODY MASS INDEX: 31.8 KG/M2 | HEIGHT: 60 IN | HEART RATE: 79 BPM | WEIGHT: 162 LBS | OXYGEN SATURATION: 100 % | RESPIRATION RATE: 18 BRPM | DIASTOLIC BLOOD PRESSURE: 59 MMHG | TEMPERATURE: 97.5 F | SYSTOLIC BLOOD PRESSURE: 119 MMHG

## 2024-07-23 DIAGNOSIS — J02.9 ACUTE VIRAL PHARYNGITIS: Primary | ICD-10-CM

## 2024-07-23 LAB
S PYO DNA THROAT QL NAA+PROBE: NOT DETECTED
SARS-COV-2 RNA RESP QL NAA+PROBE: NOT DETECTED

## 2024-07-23 PROCEDURE — 99283 EMERGENCY DEPT VISIT LOW MDM: CPT | Performed by: PHYSICIAN ASSISTANT

## 2024-07-23 PROCEDURE — 87635 SARS-COV-2 COVID-19 AMP PRB: CPT | Performed by: PHYSICIAN ASSISTANT

## 2024-07-23 PROCEDURE — 87651 STREP A DNA AMP PROBE: CPT | Performed by: PHYSICIAN ASSISTANT

## 2024-07-23 ASSESSMENT — PAIN - FUNCTIONAL ASSESSMENT: PAIN_FUNCTIONAL_ASSESSMENT: 0-10

## 2024-07-23 ASSESSMENT — COLUMBIA-SUICIDE SEVERITY RATING SCALE - C-SSRS
2. HAVE YOU ACTUALLY HAD ANY THOUGHTS OF KILLING YOURSELF?: NO
6. HAVE YOU EVER DONE ANYTHING, STARTED TO DO ANYTHING, OR PREPARED TO DO ANYTHING TO END YOUR LIFE?: NO
1. IN THE PAST MONTH, HAVE YOU WISHED YOU WERE DEAD OR WISHED YOU COULD GO TO SLEEP AND NOT WAKE UP?: NO

## 2024-07-23 ASSESSMENT — PAIN SCALES - GENERAL: PAINLEVEL_OUTOF10: 7

## 2024-07-23 ASSESSMENT — PAIN DESCRIPTION - LOCATION: LOCATION: THROAT

## 2024-07-23 NOTE — ED PROVIDER NOTES
HPI   Chief Complaint   Patient presents with    Sore Throat       History of present illness: 24-year-old female complains of sore throat for the past 3 days.  Says the pain is moderate aching persistent and more in the right tonsil on the left.  Has painful swallowing but she has ability in doing so.  Says that a family member was recently ill and diagnosed with strep pharyngitis.  She has been taking ibuprofen on and off with limited improvement.  Denies any has had some associated congestion.  Denies any fevers chills sweats nausea vomiting myalgias cough.    Review of systems: Constitutional, eye, ENT, cardiovascular, respiratory, gastrointestinal, genitourinary, neurologic, musculoskeletal, dermatologic, hematologic, endocrine systems were evaluated and were negative unless otherwise specified in history of present illness.    Medications: Reviewed and per nursing note.    Family history: Denies relevant medical conditions.    Social history: Denies tobacco, alcohol, drug use.      Physical exam:    Appearance: Well-developed, well-nourished, nontoxic-appearing, alert and oriented x3. Talking in complete sentences.    HEENT: Pharyngeal erythema with 2+ symmetric edema and no exudates.  head normocephalic atraumatic, extraocular movements intact, pupils equal round reactive to light, mucous membranes are moist and pink, normal facial symmetry. Uvula is midline with no stridor, drooling, trismus. No induration the floor of the mouth.  Normal tympanic membranes.    NECK:  Nml Inspection, no meningismus, no thyromegaly, no lymphadenopathy, trachea is midline, no JVD.    Respiratory: Clear to auscultation bilaterally with normal bilateral excursion. No wheezes, rhonchi, rales.    Cardiovascular: Regular rate and rhythm, no murmurs rubs or gallops. Pulses 2+ symmetrically in the dorsalis pedis and radial pulses.    Abdomen/GI:  Soft, nontender.    :  No CVA tenderness    Neuro:  Oriented x 3, Speech Clear, cranial  nerves grossly intact.    Musculoskeletal: Patient spontaneously moves all 4 extremities.    Skin:  No rashes.                Patient History   Past Medical History:   Diagnosis Date    COVID 2024    Encounter for contraceptive management, unspecified 2018    Contraception management    Encounter for initial prescription of contraceptive pills 2016    Encounter for initial prescription of contraceptive pills    Encounter for initial prescription of injectable contraceptive 10/12/2020    Encounter for initial prescription of injectable contraceptive    Encounter for other specified surgical aftercare 10/01/2020    Postoperative visit    Encounter for other specified surgical aftercare 10/01/2020    Postoperative visit    Encounter for pregnancy test, result negative 2021    Negative pregnancy test    Encounter for pregnancy test, result positive ([facility])     Positive pregnancy test    Encounter for removal of intrauterine contraceptive device 2017    Encounter for removal of intrauterine contraceptive device    Encounter for routine postpartum follow-up ([facility]) 2018    Postpartum exam    Encounter for routine postpartum follow-up ([facility]) 2017    Postpartum examination following  delivery    Encounter for screening for infections with a predominantly sexual mode of transmission 11/15/2022    Screen for STD (sexually transmitted disease)    Encounter for supervision of normal pregnancy, unspecified, second trimester ([facility]) 2017    Second trimester pregnancy    Encounter for supervision of normal pregnancy, unspecified, second trimester ([facility]) 2017    Second trimester pregnancy    Encounter for supervision of normal pregnancy, unspecified, third trimester ([facility]) 2018    Third trimester pregnancy    Fatigue 2024    Gonococcal infection of lower genitourinary tract with periurethral and accessory gland abscess 2021    Infection  of lower genitourinary tract with periurethral gland abscess due to Neisseria gonorrhea    Low back pain, unspecified 06/15/2022    Maternal care for unspecified type scar from previous  delivery ([facility])     Previous  section complicating pregnancy    Nausea and vomiting 10/12/2022    Other acute postprocedural pain 2020    Postoperative pain    Other conditions influencing health status 2015    History of pregnancy    Personal history of other complications of pregnancy, childbirth and the puerperium 2018    History of postpartum depression    Personal history of other diseases of the female genital tract 2017    History of amenorrhea    Personal history of other infectious and parasitic diseases 2021    History of trichomonal vaginitis    Streptococcus, group b, as the cause of diseases classified elsewhere     Group beta Strep positive    Thumbnail injury 2024     Past Surgical History:   Procedure Laterality Date    OTHER SURGICAL HISTORY  10/27/2020    Dilation and curettage    OTHER SURGICAL HISTORY  10/27/2020     section     Family History   Problem Relation Name Age of Onset    No Known Problems Mother      COPD Father      Stroke Sister      Hypertension Sister      Heart disease Sister       Social History     Tobacco Use    Smoking status: Some Days     Types: Cigarettes, Cigars    Smokeless tobacco: Never    Tobacco comments:     Infrequent smoker.  Patient notes is not a problem   Vaping Use    Vaping status: Never Used   Substance Use Topics    Alcohol use: Never    Drug use: Yes     Types: Marijuana       Physical Exam   ED Triage Vitals [24 1008]   Temperature Heart Rate Respirations BP   36.4 °C (97.5 °F) 79 18 119/59      Pulse Ox Temp Source Heart Rate Source Patient Position   100 % Temporal -- --      BP Location FiO2 (%)     -- --       Physical Exam      ED Course & MDM   Diagnoses as of 24 1138   Acute viral  pharyngitis                       [place] Coma Scale Score: 15                        Medical Decision Making  Labs Reviewed  GROUP A STREPTOCOCCUS, PCR - Normal     Group A Strep PCR                                 SARS-COV-2 PCR - Normal      Patient complains of sore throat.  Differential diagnosis of COVID-19, influenza, pneumonia, otitis media, viral and strep tonsillitis, peritonsillar abscess, meningitis, sinusitis.  Examination shows lungs clear to auscultation, normal tympanic membranes, no meningeal signs, no sinus tenderness making pneumonia, otitis media, meningitis, sinusitis unlikely.  Tonsillar edema and erythema with no exudates.  No evidence of abscess formation.     Strep, COVID-19 swabs ordered.    Strep and COVID are negative.  Presentation consistent with viral pharyngitis.  Patient educated supportive care.    Patient will be discharged to home with prescription.  Patient is educated in signs and symptoms of worsening symptoms and reasons to come back to the emergency department.  Will need to follow up with primary care provider.  Patient does not report social determinants of health impacting ability to obtain care that is needed.  Patient agrees with plan.    This is a transcription.  Text was reviewed for errors, but some transcription errors may remain.  Please call for any questions.          Procedure  Procedures     Shane Kerr PA-C  07/23/24 [id number]

## 2024-07-23 NOTE — ED TRIAGE NOTES
Pt presents to the ED from home with c/o possible strep. Pt states she was helping strip the bed of a family member who has strep and she states she immediately felt her throat get sore. Pt states it doesn't hurt too much to swallow but she does have mucous stuck in her throat.

## 2024-07-26 ENCOUNTER — OFFICE VISIT (OUTPATIENT)
Dept: PRIMARY CARE | Facility: CLINIC | Age: 24
End: 2024-07-26
Payer: COMMERCIAL

## 2024-07-26 VITALS
BODY MASS INDEX: 30.86 KG/M2 | OXYGEN SATURATION: 100 % | HEART RATE: 80 BPM | HEIGHT: 60 IN | SYSTOLIC BLOOD PRESSURE: 100 MMHG | DIASTOLIC BLOOD PRESSURE: 50 MMHG | WEIGHT: 157.2 LBS

## 2024-07-26 DIAGNOSIS — M79.601 RIGHT ARM PAIN: ICD-10-CM

## 2024-07-26 DIAGNOSIS — J02.8 SORE THROAT (VIRAL): ICD-10-CM

## 2024-07-26 DIAGNOSIS — B97.89 SORE THROAT (VIRAL): ICD-10-CM

## 2024-07-26 DIAGNOSIS — G47.9 SLEEP DISTURBANCE: Primary | ICD-10-CM

## 2024-07-26 PROCEDURE — 4004F PT TOBACCO SCREEN RCVD TLK: CPT | Performed by: NURSE PRACTITIONER

## 2024-07-26 PROCEDURE — 99214 OFFICE O/P EST MOD 30 MIN: CPT | Performed by: NURSE PRACTITIONER

## 2024-07-26 PROCEDURE — 3008F BODY MASS INDEX DOCD: CPT | Performed by: NURSE PRACTITIONER

## 2024-07-26 RX ORDER — IBUPROFEN 600 MG/1
600 TABLET ORAL 3 TIMES DAILY PRN
Qty: 30 TABLET | Refills: 0 | Status: SHIPPED | OUTPATIENT
Start: 2024-07-26

## 2024-07-26 ASSESSMENT — PATIENT HEALTH QUESTIONNAIRE - PHQ9
SUM OF ALL RESPONSES TO PHQ9 QUESTIONS 1 AND 2: 2
1. LITTLE INTEREST OR PLEASURE IN DOING THINGS: SEVERAL DAYS
10. IF YOU CHECKED OFF ANY PROBLEMS, HOW DIFFICULT HAVE THESE PROBLEMS MADE IT FOR YOU TO DO YOUR WORK, TAKE CARE OF THINGS AT HOME, OR GET ALONG WITH OTHER PEOPLE: NOT DIFFICULT AT ALL
2. FEELING DOWN, DEPRESSED OR HOPELESS: SEVERAL DAYS

## 2024-07-26 NOTE — PROGRESS NOTES
"Subjective   Patient ID: Argelia Valentine is a 24 y.o. female who presents for Follow-up (FUV from sleep study in ER visit/Right arm hurt from injection from 2 days ago ).    Argelia presents to the office today for a follow-up on recent sleep study. Stated she is tired during the day. Unsure if she snores at bedtime. Doesn't feel rested upon awakening. No problems falling asleep. Can stay asleep until she has to get up to go to the bathroom (1-2 times), but falls back asleep quickly. Father with history of narcolepsy. Sleep study did not show sleep apnea.    Not currently employed.     Had gone to ED for sore throat. Cousin had Strep; she tested negative for Strep and Covid. Throat feels dry and right tonsil swollen. Stated sore throat is improving.    Has pain in right arm; gets monthly injection for \"hypervigilance\".          Review of Systems   All other systems reviewed and are negative.      Objective   /50 (BP Location: Left arm)   Pulse 80   Ht 1.524 m (5')   Wt 71.3 kg (157 lb 3.2 oz)   SpO2 100%   BMI 30.70 kg/m²     Physical Exam  Constitutional:       Appearance: Normal appearance.   HENT:      Head: Normocephalic and atraumatic.      Right Ear: Tympanic membrane normal.      Left Ear: Tympanic membrane normal.      Nose: Nose normal.      Mouth/Throat:      Mouth: Mucous membranes are moist.      Pharynx: Oropharynx is clear.   Eyes:      Extraocular Movements: Extraocular movements intact.      Conjunctiva/sclera: Conjunctivae normal.      Pupils: Pupils are equal, round, and reactive to light.   Cardiovascular:      Rate and Rhythm: Normal rate and regular rhythm.      Pulses: Normal pulses.      Heart sounds: Normal heart sounds.   Pulmonary:      Effort: Pulmonary effort is normal.      Breath sounds: Normal breath sounds.   Abdominal:      General: Abdomen is flat. Bowel sounds are normal.      Palpations: Abdomen is soft.   Musculoskeletal:         General: Normal range of motion.      Cervical " back: Normal range of motion and neck supple.   Skin:     General: Skin is warm and dry.      Capillary Refill: Capillary refill takes less than 2 seconds.   Neurological:      General: No focal deficit present.      Mental Status: She is alert and oriented to person, place, and time. Mental status is at baseline.   Psychiatric:         Mood and Affect: Mood normal.         Behavior: Behavior normal.         Thought Content: Thought content normal.         Judgment: Judgment normal.         Assessment/Plan   Problem List Items Addressed This Visit    None  Visit Diagnoses         Codes    Sleep disturbance    -  Primary G47.9    Relevant Orders    Referral to Adult Sleep Medicine    Right arm pain     M79.601    Relevant Medications    ibuprofen 600 mg tablet          1 )Sleep disturbance: referral placed for sleep medicine.    2) Right arm pain: recommend heat/ice alternation and Ibuprofen for pain. Follow-up if no improvement or if symptoms worsen.    3) Sore throat: Counseled on warm salt water gargles, push fluids, Ibuprofen 600 mg every 6-8 hours for the next 24-48 hours, warm honey or lemon tea along with Cepacol or Chloraseptic throat lozenges for sore throat. Follow-up if no improvement or if symptoms worsen.

## 2024-08-05 ENCOUNTER — APPOINTMENT (OUTPATIENT)
Dept: PHYSICAL THERAPY | Facility: CLINIC | Age: 24
End: 2024-08-05
Payer: COMMERCIAL

## 2024-08-05 ENCOUNTER — DOCUMENTATION (OUTPATIENT)
Dept: PHYSICAL THERAPY | Facility: CLINIC | Age: 24
End: 2024-08-05
Payer: COMMERCIAL

## 2024-08-05 NOTE — PROGRESS NOTES
Physical Therapy                 Therapy Communication Note    Patient Name: Argelia Valentine  MRN: [id number]  Today's Date: 8/5/2024     Discipline: Physical Therapy    Missed Visit Reason:  Called to cancel at 8:03 am, car is till in the shop.     Missed Time: Cancel    Comment:

## 2024-08-12 ENCOUNTER — APPOINTMENT (OUTPATIENT)
Dept: PHYSICAL THERAPY | Facility: CLINIC | Age: 24
End: 2024-08-12
Payer: COMMERCIAL

## 2024-08-13 ENCOUNTER — APPOINTMENT (OUTPATIENT)
Dept: PHYSICAL THERAPY | Facility: CLINIC | Age: 24
End: 2024-08-13
Payer: COMMERCIAL

## 2024-08-13 DIAGNOSIS — M25.561 BILATERAL KNEE PAIN: ICD-10-CM

## 2024-08-13 DIAGNOSIS — M22.2X1 PATELLOFEMORAL PAIN SYNDROME OF BOTH KNEES: ICD-10-CM

## 2024-08-13 DIAGNOSIS — M22.2X2 PATELLOFEMORAL PAIN SYNDROME OF BOTH KNEES: ICD-10-CM

## 2024-08-13 DIAGNOSIS — M25.562 BILATERAL KNEE PAIN: ICD-10-CM

## 2024-08-13 PROCEDURE — 97110 THERAPEUTIC EXERCISES: CPT | Mod: GP

## 2024-08-13 NOTE — PROGRESS NOTES
Physical Therapy    Physical Therapy    Physical Therapy follow    Patient Name: Argelia Valentine  MRN: [id number]  Today's Date: 8/13/2024     PT Therapeutic Procedures Time Entry  Therapeutic Exercise Time Entry: 40                                    Visit #3  Insurance; [facility] (APPROVED FOR 8 VISITS UNTIL 9/27/24)  Problem List Items Addressed This Visit             ICD-10-CM       Musculoskeletal and Injuries    Bilateral knee pain M25.561, M25.562    Patellofemoral pain syndrome of both knees M22.2X1, M22.2X2       Assessment; Patient presents with pain in both knees, with right knee being more acute with onset in June of this year. No known reason for pain. PFS is suspected. Quads weakness evident by MMT. Bilateral knees end range of extension and flexion producing pain. All exercise activity have been stopped due to pain. Standing, playing, running, climbing stairs, squatting is painful. Therex required for strengthening and correction of forces effecting left/right patella. Patient will benefit from skilled PT, demonstrates motivation to address problem and resume ability to function and play with young kids, free of pain.   7/22/2024;  Closed chain there ex instructed and understood with minor body mechanics corrections. No pain exacerbation reported.   8/13/2024; Revision of exercises tolerated without knee pain production bilaterally        Plan; patient education, there ex configuration, manual therapy as needed        Current Problem  1. Patellofemoral pain syndrome of both knees  Follow Up In Physical Therapy      2. Bilateral knee pain  Follow Up In Physical Therapy          Subjective   General: My thighs are burning from trying to work out yesterday.     Pain: left knee feels like it wants to pop, no pain     Home Living: lives in house with multiple stairs, taking care of two young kids     Prior Function Per Pt/Caregiver Report: independent        Objective   Posture: WNL     Range of Motion: Left  knee 0-128 AROM/ 0-135 PROM with empty end feel                 Right knee 0-120 AROM/ 0-130 PROM with empty end feel     Strength: Right VMO 4-/5                   Left VMO 4-/5  Hip flexors and Quads 4/5 bilateral  Hip adductors 4-/5 bilaterally  Hip abductors 4+/5 bilaterally  Hip extensors 5/5  Hamstrings 5-/5     Flexibility: lacking full bilateral Hamstrings and Quads flexibility, with right side tighter and more painful     Palpation: tenderness and pain of medial patellar retinaculum, bilaterally      Special Tests: Negative Anterior/Posterior Drawer's tests and tests for collateral ligaments laxity. Pain produce with medial valgus forces bilaterally. Pain located medially along joint line - medial patellar retinaculum   Pain produced with lateral shifts and pull of patella left/right  Pain produced with superior patellar pull bilaterally      Gait: normal gait on level surface, with reported avoidance of hyperextension of knees due to more intense pain    Outcome Measures:  LEFS = 43/80     OP EDUCATION:       PT intervention;  SCIFIT Bike x 5 minutes with 3.0 resistance   Side lunges on stairs; 2 x 10 reps with each leg  Step up and march on 6 inch step; 3 x 10 each leg  Knee extension machine with 30 Ibs; 3 x 10  Hamstrings curls on Machine with 30 Ibs; 3 x 10  Leg press machine  SLRs; (recommended 3 x 10 per leg) - HEP  Supine Hamstrings stretches with strap; 30 seconds x 3 each leg - HEP  Prone Quads stretches with strap; 30 seconds x 3 each leg  TKE with TB resistance; 3 x 10 each leg  Side steps with black TB resistance  Standing hip extension and hip abductions by matrix resistance of 7.5 Ibs;    Goals:  Active       PT Problem       PT Goal 1       Start:  07/10/24    Expected End:  09/27/24       Patient will be able to demonstrated and report improved functional ambulation with different daily tasks and/or recreational activities, as evident by LEFS of 60 or higher          PT Goal 2       Start:   07/10/24       Patient will report reduced/abolished pain in bilateral knees, indicated by grade of 0-2 on 0-10 pain scale with most demanding activities          PT Goal 3       Start:  07/10/24       Patient will demonstrated improved strength of bilateral Quads, hip flexors, Gluteals, Medial Hamstrings and other adductors, evident by MMT of 5-/5, and functionally evident by easy of squatting, jogging, stair climbing, etc          PT Goal 4       Start:  07/10/24       Patient will demonstrated knowledge of HEP, its maintenance frequency, and associated benefits

## 2024-08-19 ENCOUNTER — APPOINTMENT (OUTPATIENT)
Dept: PHYSICAL THERAPY | Facility: CLINIC | Age: 24
End: 2024-08-19
Payer: COMMERCIAL

## 2024-08-20 ENCOUNTER — APPOINTMENT (OUTPATIENT)
Dept: PHYSICAL THERAPY | Facility: CLINIC | Age: 24
End: 2024-08-20
Payer: COMMERCIAL

## 2024-08-20 DIAGNOSIS — M22.2X1 PATELLOFEMORAL PAIN SYNDROME OF BOTH KNEES: ICD-10-CM

## 2024-08-20 DIAGNOSIS — M25.562 BILATERAL KNEE PAIN: ICD-10-CM

## 2024-08-20 DIAGNOSIS — M25.561 BILATERAL KNEE PAIN: ICD-10-CM

## 2024-08-20 DIAGNOSIS — M22.2X2 PATELLOFEMORAL PAIN SYNDROME OF BOTH KNEES: ICD-10-CM

## 2024-08-20 PROCEDURE — 97110 THERAPEUTIC EXERCISES: CPT | Mod: GP,CQ

## 2024-08-20 ASSESSMENT — PAIN SCALES - GENERAL: PAINLEVEL_OUTOF10: 6

## 2024-08-20 ASSESSMENT — PAIN - FUNCTIONAL ASSESSMENT: PAIN_FUNCTIONAL_ASSESSMENT: 0-10

## 2024-08-20 NOTE — PROGRESS NOTES
"Physical Therapy    Physical Therapy follow    Patient Name: Argelia Valentine  MRN: [id number]  Today's Date: 8/20/2024     PT Therapeutic Procedures Time Entry  Therapeutic Exercise Time Entry: 45                                  Visit #4  Insurance; [facility] (APPROVED FOR 8 VISITS UNTIL 9/27/24)    Problem List Items Addressed This Visit             ICD-10-CM    Bilateral knee pain M25.561, M25.562    Patellofemoral pain syndrome of both knees M22.2X1, M22.2X2       Assessment;  Evaluation/Treatment Tolerance: Patient tolerated treatment well  Completes this treatment session with occasional tolerable discomfort in both knees, although mostly the left. Observed muscular shaking with added 4\" step downs and when eccentrically lowering after quad extensions. Fatigue through out session, rest breaks given when needed. Finishes session tired but the knees feel less tight.     Plan; patient education, there ex configuration, manual therapy as needed      Current Problem  1. Patellofemoral pain syndrome of both knees  Follow Up In Physical Therapy      2. Bilateral knee pain  Follow Up In Physical Therapy        Subjective   Sensation of tightness/pulling in both knees  Pain currently is at a 6/10  HEP has been completed once daily, soreness afterwards  Going down stairs is hard   No other major updates at this time    Pain: left knee feels like it wants to pop, no pain  Pain Assessment: 0-10  0-10 (Numeric) Pain Score: 6  Home Living: lives in house with multiple stairs, taking care of two young kids     Prior Function Per Pt/Caregiver Report: independent        Objective   Posture: WNL     Range of Motion: Left knee 0-128 AROM/ 0-135 PROM with empty end feel                 Right knee 0-120 AROM/ 0-130 PROM with empty end feel     Strength: Right VMO 4-/5                   Left VMO 4-/5  Hip flexors and Quads 4/5 bilateral  Hip adductors 4-/5 bilaterally  Hip abductors 4+/5 bilaterally  Hip extensors 5/5  Hamstrings 5-/5   " "  Flexibility: lacking full bilateral Hamstrings and Quads flexibility, with right side tighter and more painful     Palpation: tenderness and pain of medial patellar retinaculum, bilaterally      Special Tests: Negative Anterior/Posterior Drawer's tests and tests for collateral ligaments laxity. Pain produce with medial valgus forces bilaterally. Pain located medially along joint line - medial patellar retinaculum   Pain produced with lateral shifts and pull of patella left/right  Pain produced with superior patellar pull bilaterally      Gait: normal gait on level surface, with reported avoidance of hyperextension of knees due to more intense pain    Outcome Measures:  LEFS = 43/80     OP EDUCATION:       PT intervention;  SCIFIT Bike x 5 minutes with 3.0 resistance   Side lunges on stairs; 2 x 10 reps with each leg  Step up and march on 6 inch step; 3 x 10 each leg  4\" step downs so control x 10 each leg  Knee extension machine with 30 Ibs; 3 x 10  Hamstrings curls on Machine with 30 Ibs; 3 x 10  Leg press machine 4# x 20  Prone Quads stretches with strap; 30 seconds x 3 each leg  Side steps with black TB resistance white tape x 2  Standing hip extension and hip abductions GTB x 20  Foam standing mini squat x 20    Goals:  Active       PT Problem       PT Goal 1       Start:  07/10/24    Expected End:  09/27/24       Patient will be able to demonstrated and report improved functional ambulation with different daily tasks and/or recreational activities, as evident by LEFS of 60 or higher          PT Goal 2       Start:  07/10/24       Patient will report reduced/abolished pain in bilateral knees, indicated by grade of 0-2 on 0-10 pain scale with most demanding activities          PT Goal 3       Start:  07/10/24       Patient will demonstrated improved strength of bilateral Quads, hip flexors, Gluteals, Medial Hamstrings and other adductors, evident by MMT of 5-/5, and functionally evident by easy of squatting, " jogging, stair climbing, etc          PT Goal 4       Start:  07/10/24       Patient will demonstrated knowledge of HEP, its maintenance frequency, and associated benefits

## 2024-08-26 ENCOUNTER — APPOINTMENT (OUTPATIENT)
Dept: PHYSICAL THERAPY | Facility: CLINIC | Age: 24
End: 2024-08-26
Payer: COMMERCIAL

## 2024-08-27 ENCOUNTER — DOCUMENTATION (OUTPATIENT)
Dept: PHYSICAL THERAPY | Facility: CLINIC | Age: 24
End: 2024-08-27

## 2024-08-27 ENCOUNTER — APPOINTMENT (OUTPATIENT)
Dept: PHYSICAL THERAPY | Facility: CLINIC | Age: 24
End: 2024-08-27
Payer: COMMERCIAL

## 2024-08-27 NOTE — PROGRESS NOTES
Physical Therapy                 Therapy Communication Note    Patient Name: Argelia Valentine  MRN: [id number]  Today's Date: 8/27/2024     Discipline: Physical Therapy    Missed Visit Reason:  No showed, called patient, had a family emergency and was unable to attend. Reviewed final appt date/time.     Missed Time: No Show    Comment:

## 2024-09-03 ENCOUNTER — APPOINTMENT (OUTPATIENT)
Dept: PHYSICAL THERAPY | Facility: CLINIC | Age: 24
End: 2024-09-03
Payer: COMMERCIAL

## 2024-09-03 ENCOUNTER — DOCUMENTATION (OUTPATIENT)
Dept: PHYSICAL THERAPY | Facility: CLINIC | Age: 24
End: 2024-09-03

## 2024-09-03 ENCOUNTER — APPOINTMENT (OUTPATIENT)
Dept: SLEEP MEDICINE | Facility: CLINIC | Age: 24
End: 2024-09-03
Payer: COMMERCIAL

## 2024-09-03 NOTE — PROGRESS NOTES
Physical Therapy                 Therapy Communication Note    Patient Name: Argelia Valentine  MRN: [id number]  Today's Date: 9/3/2024     Discipline: Physical Therapy    Missed Visit Reason:      Missed Time: No Show    Comment: no more visits scheduled

## 2024-09-16 ENCOUNTER — APPOINTMENT (OUTPATIENT)
Dept: OBSTETRICS AND GYNECOLOGY | Facility: CLINIC | Age: 24
End: 2024-09-16
Payer: COMMERCIAL

## 2024-09-16 VITALS — DIASTOLIC BLOOD PRESSURE: 72 MMHG | SYSTOLIC BLOOD PRESSURE: 112 MMHG | WEIGHT: 163.4 LBS | BODY MASS INDEX: 31.91 KG/M2

## 2024-09-16 DIAGNOSIS — Z3A.08 8 WEEKS GESTATION OF PREGNANCY (HHS-HCC): Primary | ICD-10-CM

## 2024-09-16 DIAGNOSIS — Z87.59 HISTORY OF PRE-ECLAMPSIA: ICD-10-CM

## 2024-09-16 DIAGNOSIS — Z98.891 HISTORY OF CESAREAN SECTION: ICD-10-CM

## 2024-09-16 DIAGNOSIS — Z32.01 PREGNANCY TEST POSITIVE (HHS-HCC): ICD-10-CM

## 2024-09-16 LAB — PREGNANCY TEST URINE, POC: POSITIVE

## 2024-09-16 PROCEDURE — 87491 CHLMYD TRACH DNA AMP PROBE: CPT

## 2024-09-16 PROCEDURE — 87591 N.GONORRHOEAE DNA AMP PROB: CPT

## 2024-09-16 PROCEDURE — 87086 URINE CULTURE/COLONY COUNT: CPT

## 2024-09-16 ASSESSMENT — ENCOUNTER SYMPTOMS
HEMATOLOGIC/LYMPHATIC NEGATIVE: 0
ALLERGIC/IMMUNOLOGIC NEGATIVE: 0
CARDIOVASCULAR NEGATIVE: 0
RESPIRATORY NEGATIVE: 0
ENDOCRINE NEGATIVE: 0
EYES NEGATIVE: 0
MUSCULOSKELETAL NEGATIVE: 0
NEUROLOGICAL NEGATIVE: 0
GASTROINTESTINAL NEGATIVE: 0
PSYCHIATRIC NEGATIVE: 0
CONSTITUTIONAL NEGATIVE: 0

## 2024-09-16 ASSESSMENT — EDINBURGH POSTNATAL DEPRESSION SCALE (EPDS)
I HAVE FELT SCARED OR PANICKY FOR NO GOOD REASON: YES, SOMETIMES
I HAVE BEEN SO UNHAPPY THAT I HAVE BEEN CRYING: YES, QUITE OFTEN
THINGS HAVE BEEN GETTING ON TOP OF ME: YES, SOMETIMES I HAVEN'T BEEN COPING AS WELL AS USUAL
I HAVE BLAMED MYSELF UNNECESSARILY WHEN THINGS WENT WRONG: YES, MOST OF THE TIME
I HAVE BEEN SO UNHAPPY THAT I HAVE HAD DIFFICULTY SLEEPING: YES, MOST OF THE TIME
I HAVE LOOKED FORWARD WITH ENJOYMENT TO THINGS: RATHER LESS THAN I USED TO
THE THOUGHT OF HARMING MYSELF HAS OCCURRED TO ME: NEVER
I HAVE BEEN ANXIOUS OR WORRIED FOR NO GOOD REASON: YES, VERY OFTEN
I HAVE BEEN ABLE TO LAUGH AND SEE THE FUNNY SIDE OF THINGS: NOT QUITE SO MUCH NOW
TOTAL SCORE: 19
I HAVE FELT SAD OR MISERABLE: YES, QUITE OFTEN

## 2024-09-16 NOTE — PROGRESS NOTES
Subjective   Patient ID [id number]   Argelia Valentine is a 24 y.o.  at 8w5d with a working estimated date of delivery of 2025, by Last Menstrual Period who presents for an initial prenatal visit. This pregnancy is planned.    Her pregnancy is complicated by:  3 prior  sections  History of preeclampsia, history of  delivery secondary    OB History    Para Term  AB Living   5 3 2 1 1 3   SAB IAB Ectopic Multiple Live Births   1              # Outcome Date GA Lbr Jonn/2nd Weight Sex Type Anes PTL Lv   5 Current            4 SAB            3             2 Term            1 Term              [place]  Depression Scale Total: 19    Objective   Physical Exam  Weight: 74.1 kg (163 lb 6.4 oz)  Expected Total Weight Gain: Could not be calculated   Pregravid BMI: Could not be calculated  BP: 112/72          OBGyn Exam    Prenatal Labs  Ordered  Will order genetic prequel next visit    Assessment/Plan   Diagnoses and all orders for this visit:  Pregnancy test positive ([facility])  -     POCT pregnancy, urine manually resulted    Prenatal Labs ordered  Daily prenatal vitamins prescribed  First trimester screening and second trimester screening discussed. Patient decided to have screening  Follow up in 2 weeks for return OB visit.

## 2024-09-17 LAB
C TRACH RRNA SPEC QL NAA+PROBE: NEGATIVE
N GONORRHOEA DNA SPEC QL PROBE+SIG AMP: NEGATIVE

## 2024-09-18 LAB — BACTERIA UR CULT: NORMAL

## 2024-09-20 ENCOUNTER — HOSPITAL ENCOUNTER (OUTPATIENT)
Dept: RADIOLOGY | Facility: CLINIC | Age: 24
Discharge: HOME | End: 2024-09-20
Payer: COMMERCIAL

## 2024-09-20 DIAGNOSIS — Z3A.08 8 WEEKS GESTATION OF PREGNANCY (HHS-HCC): ICD-10-CM

## 2024-09-20 PROCEDURE — 76801 OB US < 14 WKS SINGLE FETUS: CPT

## 2024-09-20 PROCEDURE — 76817 TRANSVAGINAL US OBSTETRIC: CPT

## 2024-09-30 ENCOUNTER — ROUTINE PRENATAL (OUTPATIENT)
Dept: OBSTETRICS AND GYNECOLOGY | Facility: CLINIC | Age: 24
End: 2024-09-30
Payer: COMMERCIAL

## 2024-09-30 VITALS — DIASTOLIC BLOOD PRESSURE: 62 MMHG | SYSTOLIC BLOOD PRESSURE: 109 MMHG | BODY MASS INDEX: 31.25 KG/M2 | WEIGHT: 160 LBS

## 2024-09-30 DIAGNOSIS — R11.10 HYPEREMESIS: ICD-10-CM

## 2024-09-30 DIAGNOSIS — O20.0 THREATENED MISCARRIAGE (HHS-HCC): Primary | ICD-10-CM

## 2024-09-30 DIAGNOSIS — O02.0 ANEMBRYONIC PREGNANCY (HHS-HCC): ICD-10-CM

## 2024-09-30 DIAGNOSIS — Z32.01 PREGNANCY TEST POSITIVE (HHS-HCC): ICD-10-CM

## 2024-09-30 PROBLEM — D50.9 IRON DEFICIENCY ANEMIA: Status: RESOLVED | Noted: 2023-09-05 | Resolved: 2024-09-30

## 2024-09-30 PROBLEM — B37.9 YEAST INFECTION: Status: RESOLVED | Noted: 2023-09-05 | Resolved: 2024-09-30

## 2024-09-30 LAB
POC BLOOD, URINE: NEGATIVE
POC GLUCOSE, URINE: NEGATIVE MG/DL
POC KETONES, URINE: NEGATIVE MG/DL
POC LEUKOCYTES, URINE: NEGATIVE
POC NITRITE,URINE: NEGATIVE
POC PROTEIN, URINE: ABNORMAL MG/DL

## 2024-09-30 PROCEDURE — 99214 OFFICE O/P EST MOD 30 MIN: CPT | Mod: TH | Performed by: OBSTETRICS & GYNECOLOGY

## 2024-09-30 PROCEDURE — 99214 OFFICE O/P EST MOD 30 MIN: CPT | Performed by: OBSTETRICS & GYNECOLOGY

## 2024-09-30 PROCEDURE — 81003 URINALYSIS AUTO W/O SCOPE: CPT | Mod: QW | Performed by: OBSTETRICS & GYNECOLOGY

## 2024-09-30 NOTE — PROGRESS NOTES
"Subjective   Patient ID [id number]   Argelia Valentine is a 24 y.o.  at 10w5d with a working estimated date of delivery of 2025, by Last Menstrual Period who presents for a routine prenatal visit. She denies vaginal bleeding, leakage of fluid, decreased fetal movements, and contractions.    Her pregnancy is complicated by:  Nausea    Objective   Physical Exam  Weight: 72.6 kg (160 lb), Pregravid BMI: Could not be calculated  Expected Total Weight Gain: Could not be calculated   BP: 109/62         Prenatal Labs  Urine dip:  Lab Results   Component Value Date    KETONESU NEGATIVE 2020     Lab Results   Component Value Date    HGB 7.5 (L) 2024    HCT 26.1 (L) 2024    ABO B 2024    HEPBSAG NONREACTIVE 2023     No results found for: \"PAPPA\", \"AFP\", \"HCG\", \"ESTRIOL\", \"INHBA\"    Imaging  The most recent ultrasound was performed on The most recent ultrasound study is not finalized with a study GA of The most recent ultrasound study is not finalized.  The most recent ultrasound study is not finalized  The most recent ultrasound study is not finalized    Assessment/Plan   Problem List Items Addressed This Visit       Anembryonic pregnancy ([facility])    Hyperemesis    Threatened miscarriage ([facility]) - Primary    Relevant Orders    US MAC OB imaging order     Other Visit Diagnoses       Pregnancy test positive ([facility])        Relevant Orders    Urine Culture            Continue prenatal vitamin.  Labs reviewed.  Follow up in 1 weeks for a routine prenatal visit.  "

## 2024-10-03 ENCOUNTER — APPOINTMENT (OUTPATIENT)
Dept: RADIOLOGY | Facility: CLINIC | Age: 24
End: 2024-10-03
Payer: COMMERCIAL

## 2024-10-04 ENCOUNTER — HOSPITAL ENCOUNTER (OUTPATIENT)
Dept: RADIOLOGY | Facility: CLINIC | Age: 24
End: 2024-10-04
Payer: COMMERCIAL

## 2024-10-04 ENCOUNTER — TELEPHONE (OUTPATIENT)
Dept: OBSTETRICS AND GYNECOLOGY | Facility: CLINIC | Age: 24
End: 2024-10-04
Payer: COMMERCIAL

## 2024-10-04 DIAGNOSIS — Z00.00 HEALTHCARE MAINTENANCE: Primary | ICD-10-CM

## 2024-10-04 RX ORDER — METOCLOPRAMIDE 10 MG/1
10 TABLET ORAL 4 TIMES DAILY
Qty: 40 TABLET | Refills: 0 | Status: SHIPPED | OUTPATIENT
Start: 2024-10-04 | End: 2024-10-14

## 2024-10-07 ENCOUNTER — HOSPITAL ENCOUNTER (OUTPATIENT)
Dept: RADIOLOGY | Facility: HOSPITAL | Age: 24
Discharge: HOME | End: 2024-10-07
Payer: COMMERCIAL

## 2024-10-07 DIAGNOSIS — Z3A.08 8 WEEKS GESTATION OF PREGNANCY (HHS-HCC): ICD-10-CM

## 2024-10-07 PROCEDURE — 76816 OB US FOLLOW-UP PER FETUS: CPT | Performed by: OBSTETRICS & GYNECOLOGY

## 2024-10-07 PROCEDURE — 76817 TRANSVAGINAL US OBSTETRIC: CPT

## 2024-10-07 PROCEDURE — 76816 OB US FOLLOW-UP PER FETUS: CPT

## 2024-10-07 PROCEDURE — 76817 TRANSVAGINAL US OBSTETRIC: CPT | Performed by: OBSTETRICS & GYNECOLOGY

## 2024-10-08 ENCOUNTER — LAB (OUTPATIENT)
Dept: LAB | Facility: LAB | Age: 24
End: 2024-10-08
Payer: COMMERCIAL

## 2024-10-08 ENCOUNTER — APPOINTMENT (OUTPATIENT)
Dept: OBSTETRICS AND GYNECOLOGY | Facility: CLINIC | Age: 24
End: 2024-10-08
Payer: COMMERCIAL

## 2024-10-08 VITALS — SYSTOLIC BLOOD PRESSURE: 103 MMHG | DIASTOLIC BLOOD PRESSURE: 79 MMHG | WEIGHT: 161 LBS | BODY MASS INDEX: 31.44 KG/M2

## 2024-10-08 DIAGNOSIS — O34.219 HISTORY OF CESAREAN DELIVERY, CURRENTLY PREGNANT (HHS-HCC): ICD-10-CM

## 2024-10-08 DIAGNOSIS — J45.909 ASTHMA AFFECTING PREGNANCY IN FIRST TRIMESTER (HHS-HCC): ICD-10-CM

## 2024-10-08 DIAGNOSIS — Z3A.01 7 WEEKS GESTATION OF PREGNANCY (HHS-HCC): ICD-10-CM

## 2024-10-08 DIAGNOSIS — Z71.85 VACCINE COUNSELING: ICD-10-CM

## 2024-10-08 DIAGNOSIS — O21.0 HYPEREMESIS GRAVIDARUM (HHS-HCC): Primary | ICD-10-CM

## 2024-10-08 DIAGNOSIS — Z30.09 CONTRACEPTIVE EDUCATION: ICD-10-CM

## 2024-10-08 DIAGNOSIS — O99.511 ASTHMA AFFECTING PREGNANCY IN FIRST TRIMESTER (HHS-HCC): ICD-10-CM

## 2024-10-08 DIAGNOSIS — O10.919 CHRONIC HYPERTENSION IN PREGNANCY (HHS-HCC): ICD-10-CM

## 2024-10-08 PROBLEM — B96.89 BACTERIAL VAGINOSIS: Status: RESOLVED | Noted: 2023-09-05 | Resolved: 2024-10-08

## 2024-10-08 PROBLEM — N93.9 ABNORMAL VAGINAL BLEEDING: Status: RESOLVED | Noted: 2023-09-05 | Resolved: 2024-10-08

## 2024-10-08 PROBLEM — N76.0 ACUTE VAGINITIS: Status: RESOLVED | Noted: 2023-09-05 | Resolved: 2024-10-08

## 2024-10-08 PROBLEM — N89.8 VAGINAL DISCHARGE: Status: RESOLVED | Noted: 2023-09-05 | Resolved: 2024-10-08

## 2024-10-08 PROBLEM — N76.0 BACTERIAL VAGINOSIS: Status: RESOLVED | Noted: 2023-09-05 | Resolved: 2024-10-08

## 2024-10-08 PROBLEM — O20.0 THREATENED MISCARRIAGE (HHS-HCC): Status: RESOLVED | Noted: 2024-09-30 | Resolved: 2024-10-08

## 2024-10-08 PROBLEM — N83.201 HEMORRHAGIC CYST OF RIGHT OVARY: Status: RESOLVED | Noted: 2023-09-05 | Resolved: 2024-10-08

## 2024-10-08 PROBLEM — R10.30 ABDOMINAL PAIN, LOWER: Status: RESOLVED | Noted: 2023-09-05 | Resolved: 2024-10-08

## 2024-10-08 LAB
ABO GROUP (TYPE) IN BLOOD: NORMAL
ALBUMIN SERPL BCP-MCNC: 4.2 G/DL (ref 3.4–5)
ALP SERPL-CCNC: 65 U/L (ref 33–110)
ALT SERPL W P-5'-P-CCNC: 26 U/L (ref 7–45)
ANION GAP SERPL CALC-SCNC: 13 MMOL/L (ref 10–20)
ANTIBODY SCREEN: NORMAL
AST SERPL W P-5'-P-CCNC: 30 U/L (ref 9–39)
BILIRUB SERPL-MCNC: 0.2 MG/DL (ref 0–1.2)
BUN SERPL-MCNC: 7 MG/DL (ref 6–23)
CALCIUM SERPL-MCNC: 8.8 MG/DL (ref 8.6–10.3)
CHLORIDE SERPL-SCNC: 102 MMOL/L (ref 98–107)
CO2 SERPL-SCNC: 21 MMOL/L (ref 21–32)
CREAT SERPL-MCNC: 0.59 MG/DL (ref 0.5–1.05)
CREAT UR-MCNC: 197.3 MG/DL (ref 20–320)
EGFRCR SERPLBLD CKD-EPI 2021: >90 ML/MIN/1.73M*2
ERYTHROCYTE [DISTWIDTH] IN BLOOD BY AUTOMATED COUNT: 19.6 % (ref 11.5–14.5)
FERRITIN SERPL-MCNC: 20 NG/ML
GLUCOSE SERPL-MCNC: 86 MG/DL (ref 74–99)
HBV SURFACE AG SERPL QL IA: NONREACTIVE
HCT VFR BLD AUTO: 30.3 % (ref 36–46)
HCV AB SER QL: NONREACTIVE
HGB BLD-MCNC: 8.7 G/DL (ref 12–16)
IRON SATN MFR SERPL: NORMAL %
IRON SERPL-MCNC: 21 UG/DL
MCH RBC QN AUTO: 18 PG (ref 26–34)
MCHC RBC AUTO-ENTMCNC: 28.7 G/DL (ref 32–36)
MCV RBC AUTO: 63 FL (ref 80–100)
NRBC BLD-RTO: 0 /100 WBCS (ref 0–0)
PLATELET # BLD AUTO: 292 X10*3/UL (ref 150–450)
POTASSIUM SERPL-SCNC: 4.4 MMOL/L (ref 3.5–5.3)
PROT SERPL-MCNC: 7.3 G/DL (ref 6.4–8.2)
PROT UR-ACNC: 16 MG/DL (ref 5–24)
PROT/CREAT UR: 0.08 MG/MG CREAT (ref 0–0.17)
RBC # BLD AUTO: 4.82 X10*6/UL (ref 4–5.2)
REFLEX ADDED, ANEMIA PANEL: NORMAL
RH FACTOR (ANTIGEN D): NORMAL
RUBV IGG SERPL IA-ACNC: 2.4 IA
RUBV IGG SERPL QL IA: POSITIVE
SODIUM SERPL-SCNC: 132 MMOL/L (ref 136–145)
TIBC SERPL-MCNC: NORMAL UG/DL
TREPONEMA PALLIDUM IGG+IGM AB [PRESENCE] IN SERUM OR PLASMA BY IMMUNOASSAY: NONREACTIVE
UIBC SERPL-MCNC: >450 UG/DL
WBC # BLD AUTO: 5.8 X10*3/UL (ref 4.4–11.3)

## 2024-10-08 PROCEDURE — 86317 IMMUNOASSAY INFECTIOUS AGENT: CPT

## 2024-10-08 PROCEDURE — 36415 COLL VENOUS BLD VENIPUNCTURE: CPT

## 2024-10-08 PROCEDURE — 83550 IRON BINDING TEST: CPT

## 2024-10-08 PROCEDURE — 87340 HEPATITIS B SURFACE AG IA: CPT

## 2024-10-08 PROCEDURE — 83036 HEMOGLOBIN GLYCOSYLATED A1C: CPT

## 2024-10-08 PROCEDURE — 87389 HIV-1 AG W/HIV-1&-2 AB AG IA: CPT

## 2024-10-08 PROCEDURE — 99214 OFFICE O/P EST MOD 30 MIN: CPT | Performed by: STUDENT IN AN ORGANIZED HEALTH CARE EDUCATION/TRAINING PROGRAM

## 2024-10-08 PROCEDURE — 84156 ASSAY OF PROTEIN URINE: CPT

## 2024-10-08 PROCEDURE — 85027 COMPLETE CBC AUTOMATED: CPT

## 2024-10-08 PROCEDURE — 86780 TREPONEMA PALLIDUM: CPT

## 2024-10-08 PROCEDURE — 86901 BLOOD TYPING SEROLOGIC RH(D): CPT

## 2024-10-08 PROCEDURE — 82728 ASSAY OF FERRITIN: CPT

## 2024-10-08 PROCEDURE — 86900 BLOOD TYPING SEROLOGIC ABO: CPT

## 2024-10-08 PROCEDURE — 86803 HEPATITIS C AB TEST: CPT

## 2024-10-08 PROCEDURE — 82570 ASSAY OF URINE CREATININE: CPT

## 2024-10-08 PROCEDURE — 86850 RBC ANTIBODY SCREEN: CPT

## 2024-10-08 PROCEDURE — 83020 HEMOGLOBIN ELECTROPHORESIS: CPT | Performed by: STUDENT IN AN ORGANIZED HEALTH CARE EDUCATION/TRAINING PROGRAM

## 2024-10-08 PROCEDURE — 80053 COMPREHEN METABOLIC PANEL: CPT

## 2024-10-08 PROCEDURE — 83021 HEMOGLOBIN CHROMOTOGRAPHY: CPT

## 2024-10-08 RX ORDER — PYRIDOXINE HCL (VITAMIN B6) 25 MG
25 TABLET ORAL EVERY 8 HOURS
Qty: 90 TABLET | Refills: 1 | Status: SHIPPED | OUTPATIENT
Start: 2024-10-08 | End: 2025-01-06

## 2024-10-08 RX ORDER — LURASIDONE HYDROCHLORIDE 20 MG/1
TABLET, FILM COATED ORAL
COMMUNITY
Start: 2024-09-16

## 2024-10-08 RX ORDER — ONDANSETRON 4 MG/1
4 TABLET, FILM COATED ORAL EVERY 8 HOURS PRN
Qty: 28 TABLET | Refills: 0 | Status: SHIPPED | OUTPATIENT
Start: 2024-10-08 | End: 2024-10-22

## 2024-10-08 ASSESSMENT — ENCOUNTER SYMPTOMS
OCCASIONAL FEELINGS OF UNSTEADINESS: 0
DEPRESSION: 0
LOSS OF SENSATION IN FEET: 0

## 2024-10-08 NOTE — PROGRESS NOTES
Argelia Valentine is a 24 y.o.  at 7w3d here for OB follow-up.      Subjective     Main complaint today is of persistent nausea vomiting not responsive to Reglan.  Denies vaginal bleeding, leakage of fluid, painful regular uterine contractions, decreased fetal movement.     Review of Systems    OB History    Para Term  AB Living   6 3 2 1 2 1   SAB IAB Ectopic Multiple Live Births   1 1     1      # Outcome Date GA Lbr Jonn/2nd Weight Sex Type Anes PTL Lv   6 Current            5 SAB      Complete      4 Term 2018    M CS-LTranv      3 Term 2016    M CS-LTranv      2  12/23/15 32w0d   F CS-LTranv   BETZY      Complications: Severe pre-eclampsia   1 IAB 12     D&E Gen            Objective   Physical Exam  Weight: 73 kg (161 lb)  Expected Total Weight Gain: Could not be calculated   Pregravid BMI: Could not be calculated  BP: 103/79  --     --                --  Physical Exam  Constitutional:       General: She is not in acute distress.     Appearance: She is not ill-appearing, toxic-appearing or diaphoretic.   Pulmonary:      Effort: Pulmonary effort is normal.   Neurological:      Mental Status: She is alert.   Psychiatric:         Mood and Affect: Mood normal.   Vitals reviewed.          ASSESSMENT & PLAN    Argelia Valentine is a 24 y.o.  at 7w3d here for the following concerns we addressed today:    Problem List Items Addressed This Visit       7 weeks gestation of pregnancy ([facility])    Overview     Desired provider in labor: [] CNM  [] Physician  [x] Blood Products: [x] Yes, accepts [] No, needs counseling  [x] Initial BMI: Could not be calculated   [] Prenatal Labs:   [] Cervical Cancer Screening up to date  [] Rh status:   [] Genetic Screening:    [] NT US: (11-13 wks)  [] Baby ASA (if indicated):  [] Pregnancy dated by:     [] Anatomy US: (19-20 wks)  [] Federal Sterilization consent signed (if indicated):  [] 1hr GCT at 24-28wks:  [] Rhogam (if indicated):   [] Fetal  Surveillance (if indicated):  [] Tdap (27-32 wks, may be given up to 36 wks if initial window missed):   [] RSV (32-36 wks) (Sept. to end ):   [] Flu Vaccine: declines    [x] Breastfeeding: Breast and formula  [] Postpartum Birth control method: Undecided  [] GBS at 36 - 37 wks:  [] 39 weeks discussion of IOL vs. Expectant management:  [] Mode of delivery ( anticipated ):           Relevant Orders    CBC Anemia Panel With Reflex,Pregnancy    Hepatitis B surface antigen    Hepatitis C antibody    HIV 1/2 Antigen/Antibody Screen with Reflex to Confirmation    HEMOGLOBIN IDENTIFICATION WITH PATH REVIEW    Rubella Antibody, IgG    Syphilis Screen with Reflex    Type And Screen    Comprehensive Metabolic Panel (Completed)    Hemoglobin A1C    Protein, Urine Random (Completed)    Vaccine counseling    Hyperemesis gravidarum ([facility]) - Primary    Overview     Unison and B6  Pressure sea bands         Relevant Medications    doxylamine (Unisom) 25 mg tablet    pyridoxine (Vitamin B-6) 25 mg tablet    ondansetron (Zofran) 4 mg tablet    History of  delivery, currently pregnant ([facility])    Overview     History of  x 3  Will need repeat  delivery           Contraceptive education    Overview     10/8: Undecided about contraception         Chronic hypertension in pregnancy ([facility])    Overview     Not on meds         Asthma affecting pregnancy in first trimester ([facility])    Overview     On albuterol              Main complaint is hyperemesis symptoms.  Will start patient on standing doxylamine and pyridoxine as needed Zofran.    Follow up in 4 weeks      Delon Long MD

## 2024-10-09 LAB
EST. AVERAGE GLUCOSE BLD GHB EST-MCNC: 100 MG/DL
HBA1C MFR BLD: 5.1 %
HEMOGLOBIN A2: 2.4 % (ref 2–3.5)
HEMOGLOBIN A: 97.1 % (ref 95.8–98)
HEMOGLOBIN F: 0.5 % (ref 0–2)
HEMOGLOBIN IDENTIFICATION INTERPRETATION: NORMAL
HIV 1+2 AB+HIV1 P24 AG SERPL QL IA: NONREACTIVE
PATH REVIEW-HGB IDENTIFICATION: NORMAL

## 2024-10-11 DIAGNOSIS — D50.9 IRON DEFICIENCY ANEMIA, UNSPECIFIED IRON DEFICIENCY ANEMIA TYPE: ICD-10-CM

## 2024-10-11 RX ORDER — FERROUS SULFATE 325(65) MG
325 TABLET, DELAYED RELEASE (ENTERIC COATED) ORAL
Qty: 90 TABLET | Refills: 3 | Status: SHIPPED | OUTPATIENT
Start: 2024-10-11 | End: 2025-10-11

## 2024-10-15 ENCOUNTER — APPOINTMENT (OUTPATIENT)
Dept: SLEEP MEDICINE | Facility: CLINIC | Age: 24
End: 2024-10-15
Payer: COMMERCIAL

## 2024-11-07 ENCOUNTER — APPOINTMENT (OUTPATIENT)
Dept: OBSTETRICS AND GYNECOLOGY | Facility: CLINIC | Age: 24
End: 2024-11-07
Payer: COMMERCIAL

## 2024-11-07 ENCOUNTER — APPOINTMENT (OUTPATIENT)
Dept: LAB | Facility: LAB | Age: 24
End: 2024-11-07
Payer: COMMERCIAL

## 2024-11-07 VITALS — BODY MASS INDEX: 31.25 KG/M2 | SYSTOLIC BLOOD PRESSURE: 107 MMHG | WEIGHT: 160 LBS | DIASTOLIC BLOOD PRESSURE: 69 MMHG

## 2024-11-07 DIAGNOSIS — Z3A.11 11 WEEKS GESTATION OF PREGNANCY (HHS-HCC): Primary | ICD-10-CM

## 2024-11-07 DIAGNOSIS — O10.919 CHRONIC HYPERTENSION IN PREGNANCY (HHS-HCC): ICD-10-CM

## 2024-11-07 RX ORDER — NAPROXEN SODIUM 220 MG/1
162 TABLET, FILM COATED ORAL DAILY
Qty: 60 TABLET | Refills: 11 | Status: SHIPPED | OUTPATIENT
Start: 2024-11-07 | End: 2025-11-07

## 2024-11-07 NOTE — PROGRESS NOTES
Ob Follow-up  24     SUBJECTIVE      HPI: Argelia Valentine is a 24 y.o.  at 11w5d here for RPNV.  She has -contractions, -bleeding, or -LOF. Reports -normal fetal movement. Patient reports much less nausea and vomiting       OBJECTIVE  Visit Vitals  /69   Wt 72.6 kg (160 lb)   LMP 2024   BMI 31.25 kg/m²   OB Status Pregnant   Smoking Status Some Days   BSA 1.75 m²            ASSESSMENT & PLAN    Argelia Valentine is a 24 y.o.  at 11w5d here for the following concerns we addressed today:    Problem List Items Addressed This Visit       11 weeks gestation of pregnancy ([facility])    Overview     Desired provider in labor: [] CNM  [] Physician  [x] Blood Products: [x] Yes, accepts [] No, needs counseling  [x] Initial BMI: Could not be calculated   [x] Prenatal Labs:   [x] Cervical Cancer Screening up to date  [x] Rh status: b+  [x] Genetic Screening:  Ordered today  [] NT US: (11-13 wks)  [x] Baby ASA (if indicated):  [] Pregnancy dated by:     [] Anatomy US: (19-20 wks)  [] Federal Sterilization consent signed (if indicated):  [] 1hr GCT at 24-28wks:  [] Rhogam (if indicated):   [] Fetal Surveillance (if indicated):  [] Tdap (27-32 wks, may be given up to 36 wks if initial window missed):   [] RSV (32-36 wks) (Sept. to end of ):   [] Flu Vaccine: declines    [x] Breastfeeding: Breast and formula  [] Postpartum Birth control method: Undecided  [x] GBS at 36 - 37 wks:  [] 39 weeks discussion of IOL vs. Expectant management:  [] Mode of delivery ( anticipated ):           Relevant Medications    aspirin 81 mg chewable tablet    Other Relevant Orders    [facility] Prenatal Screen    Chronic hypertension in pregnancy ([facility]) - Primary    Overview     Not on meds  Begin          Relevant Medications    aspirin 81 mg chewable tablet         Orders Placed This Encounter   Procedures    [facility] Prenatal Screen     Standing Status:   Future     Number of Occurrences:   1     Standing  Expiration Date:   11/7/2025     Order Specific Question:   Patient Ethnicity     Answer:    or      Order Specific Question:   Pregnant     Answer:   Yes     Order Specific Question:   Due Date     Answer:   5/23/2025     Order Specific Question:   Pregnancy type     Answer:   Ivy (or unknown)     Order Specific Question:   Common aneuploidy, chromosome 13, 18, 21 (Z36.0)     Answer:   Yes     Order Specific Question:   Include sex chromosome analysis     Answer:   Yes     Order Specific Question:   Microdeletions, ivy only     Answer:   No     Order Specific Question:   Expanded aneuploidy, iyv only     Answer:   No     Order Specific Question:   Clinical indications     Answer:   Supervision of other high-risk pregnancy O09.899, O09.891, O09.892, O09.893     Order Specific Question:   Billing Information     Answer:   Bill to insurance - If possible, attach a copy of the patient's insurance card     Order Specific Question:   Relationship to Policy Owner     Answer:   Self     Order Specific Question:   Patient e-mail address     Answer:   [email or web address]     Order Specific Question:   Patient's phone number     Answer:   [phone]     Order Specific Question:   Authorized Representative     Answer:   By providing the below contact, I confirm that the patient has expressly consented to [facility] sharing the patients protected health information, including screening results and billing information, with the person listed upon request.        RTC in 4 weeks      Eric Navarro MD

## 2024-11-07 NOTE — PROGRESS NOTES
"Subjective   Patient ID [id number]   Argelia Valentine is a 24 y.o.  at 11w5d with a working estimated date of delivery of 2025, by Last Menstrual Period who presents for a routine prenatal visit. She denies vaginal bleeding, leakage of fluid, decreased fetal movements, and contractions.    Her pregnancy is complicated by:  Hx chronic HTN in pregnancy    Objective   Physical Exam  Weight: 72.6 kg (160 lb), Pregravid BMI: 31.83  Expected Total Weight Gain: 5 kg (11 lb)-9 kg (19 lb)   BP: 107/69  Fetal Heart Rate: 140 Fundal Height (cm): 12 cm    Prenatal Labs  Urine dip:  Lab Results   Component Value Date    KETONESU NEGATIVE 2024     Lab Results   Component Value Date    HGB 8.7 (L) 10/08/2024    HCT 30.3 (L) 10/08/2024    ABO B 10/08/2024    HEPBSAG Nonreactive 10/08/2024     No results found for: \"PAPPA\", \"AFP\", \"HCG\", \"ESTRIOL\", \"INHBA\"    Imaging  The most recent ultrasound was performed on The most recent ultrasound study is not finalized with a study GA of The most recent ultrasound study is not finalized.  The most recent ultrasound study is not finalized  The most recent ultrasound study is not finalized    Assessment/Plan   Problem List Items Addressed This Visit             ICD-10-CM    Chronic hypertension in pregnancy ([facility]) - Primary O10.919    Relevant Medications    aspirin 81 mg chewable tablet    11 weeks gestation of pregnancy ([facility]) Z3A.11    Relevant Medications    aspirin 81 mg chewable tablet    Other Relevant Orders    [facility] Prenatal Screen       Continue prenatal vitamin.  Labs reviewed.  Order placed for anatomy scan at 20 weeks.  Begin baby aspirin 81mg daily  Complete Prenatal Screen  Follow up in 4 weeks for a routine prenatal visit.  "

## 2024-11-15 ENCOUNTER — HOSPITAL ENCOUNTER (OUTPATIENT)
Dept: RADIOLOGY | Facility: HOSPITAL | Age: 24
Discharge: HOME | End: 2024-11-15
Payer: COMMERCIAL

## 2024-11-15 DIAGNOSIS — Z36.82 ENCOUNTER FOR NUCHAL TRANSLUCENCY TESTING (HHS-HCC): ICD-10-CM

## 2024-11-15 DIAGNOSIS — Z3A.08 8 WEEKS GESTATION OF PREGNANCY (HHS-HCC): ICD-10-CM

## 2024-11-15 PROCEDURE — 76813 OB US NUCHAL MEAS 1 GEST: CPT

## 2024-11-15 PROCEDURE — 76816 OB US FOLLOW-UP PER FETUS: CPT

## 2024-11-18 LAB
COMMENTS - MP RESULT TYPE: NORMAL
SCAN RESULT: NORMAL

## 2024-11-22 ENCOUNTER — APPOINTMENT (OUTPATIENT)
Dept: RADIOLOGY | Facility: HOSPITAL | Age: 24
End: 2024-11-22
Payer: COMMERCIAL

## 2024-11-22 ENCOUNTER — HOSPITAL ENCOUNTER (EMERGENCY)
Facility: HOSPITAL | Age: 24
Discharge: HOME | End: 2024-11-22
Attending: EMERGENCY MEDICINE
Payer: COMMERCIAL

## 2024-11-22 VITALS
RESPIRATION RATE: 18 BRPM | HEIGHT: 63 IN | SYSTOLIC BLOOD PRESSURE: 110 MMHG | HEART RATE: 87 BPM | WEIGHT: 161 LBS | OXYGEN SATURATION: 98 % | BODY MASS INDEX: 28.53 KG/M2 | DIASTOLIC BLOOD PRESSURE: 75 MMHG | TEMPERATURE: 98.7 F

## 2024-11-22 DIAGNOSIS — O23.12: ICD-10-CM

## 2024-11-22 DIAGNOSIS — Z34.92 SECOND TRIMESTER PREGNANCY (HHS-HCC): ICD-10-CM

## 2024-11-22 DIAGNOSIS — R10.31 RLQ ABDOMINAL PAIN: Primary | ICD-10-CM

## 2024-11-22 LAB
ALBUMIN SERPL BCP-MCNC: 3.9 G/DL (ref 3.4–5)
ALP SERPL-CCNC: 56 U/L (ref 33–110)
ALT SERPL W P-5'-P-CCNC: 20 U/L (ref 7–45)
ANION GAP SERPL CALC-SCNC: 11 MMOL/L (ref 10–20)
APPEARANCE UR: ABNORMAL
AST SERPL W P-5'-P-CCNC: 20 U/L (ref 9–39)
BACTERIA #/AREA URNS AUTO: ABNORMAL /HPF
BASOPHILS # BLD AUTO: 0.01 X10*3/UL (ref 0–0.1)
BASOPHILS NFR BLD AUTO: 0.2 %
BILIRUB SERPL-MCNC: 0.2 MG/DL (ref 0–1.2)
BILIRUB UR STRIP.AUTO-MCNC: NEGATIVE MG/DL
BUN SERPL-MCNC: 8 MG/DL (ref 6–23)
CALCIUM SERPL-MCNC: 8.9 MG/DL (ref 8.6–10.3)
CHLORIDE SERPL-SCNC: 104 MMOL/L (ref 98–107)
CO2 SERPL-SCNC: 22 MMOL/L (ref 21–32)
COLOR UR: ABNORMAL
CREAT SERPL-MCNC: 0.5 MG/DL (ref 0.5–1.05)
CRP SERPL-MCNC: 0.83 MG/DL
EGFRCR SERPLBLD CKD-EPI 2021: >90 ML/MIN/1.73M*2
EOSINOPHIL # BLD AUTO: 0.03 X10*3/UL (ref 0–0.7)
EOSINOPHIL NFR BLD AUTO: 0.5 %
ERYTHROCYTE [DISTWIDTH] IN BLOOD BY AUTOMATED COUNT: 20.3 % (ref 11.5–14.5)
GLUCOSE SERPL-MCNC: 95 MG/DL (ref 74–99)
GLUCOSE UR STRIP.AUTO-MCNC: NORMAL MG/DL
HCT VFR BLD AUTO: 29.4 % (ref 36–46)
HGB BLD-MCNC: 8.6 G/DL (ref 12–16)
IMM GRANULOCYTES # BLD AUTO: 0.03 X10*3/UL (ref 0–0.7)
IMM GRANULOCYTES NFR BLD AUTO: 0.5 % (ref 0–0.9)
KETONES UR STRIP.AUTO-MCNC: NEGATIVE MG/DL
LEUKOCYTE ESTERASE UR QL STRIP.AUTO: ABNORMAL
LYMPHOCYTES # BLD AUTO: 1.8 X10*3/UL (ref 1.2–4.8)
LYMPHOCYTES NFR BLD AUTO: 28.3 %
MCH RBC QN AUTO: 18.8 PG (ref 26–34)
MCHC RBC AUTO-ENTMCNC: 29.3 G/DL (ref 32–36)
MCV RBC AUTO: 64 FL (ref 80–100)
MONOCYTES # BLD AUTO: 0.32 X10*3/UL (ref 0.1–1)
MONOCYTES NFR BLD AUTO: 5 %
MUCOUS THREADS #/AREA URNS AUTO: ABNORMAL /LPF
NEUTROPHILS # BLD AUTO: 4.18 X10*3/UL (ref 1.2–7.7)
NEUTROPHILS NFR BLD AUTO: 65.5 %
NITRITE UR QL STRIP.AUTO: NEGATIVE
NRBC BLD-RTO: 0 /100 WBCS (ref 0–0)
PH UR STRIP.AUTO: 8.5 [PH]
PLATELET # BLD AUTO: 342 X10*3/UL (ref 150–450)
POLYCHROMASIA BLD QL SMEAR: NORMAL
POTASSIUM SERPL-SCNC: 3.9 MMOL/L (ref 3.5–5.3)
PROT SERPL-MCNC: 7.4 G/DL (ref 6.4–8.2)
PROT UR STRIP.AUTO-MCNC: ABNORMAL MG/DL
RBC # BLD AUTO: 4.57 X10*6/UL (ref 4–5.2)
RBC # UR STRIP.AUTO: NEGATIVE /UL
RBC #/AREA URNS AUTO: ABNORMAL /HPF
RBC MORPH BLD: NORMAL
SODIUM SERPL-SCNC: 133 MMOL/L (ref 136–145)
SP GR UR STRIP.AUTO: 1.03
SQUAMOUS #/AREA URNS AUTO: ABNORMAL /HPF
TARGETS BLD QL SMEAR: NORMAL
UROBILINOGEN UR STRIP.AUTO-MCNC: ABNORMAL MG/DL
WBC # BLD AUTO: 6.4 X10*3/UL (ref 4.4–11.3)
WBC #/AREA URNS AUTO: ABNORMAL /HPF

## 2024-11-22 PROCEDURE — 86140 C-REACTIVE PROTEIN: CPT | Performed by: PHYSICIAN ASSISTANT

## 2024-11-22 PROCEDURE — 81001 URINALYSIS AUTO W/SCOPE: CPT | Performed by: PHYSICIAN ASSISTANT

## 2024-11-22 PROCEDURE — 87086 URINE CULTURE/COLONY COUNT: CPT | Mod: AHULAB | Performed by: PHYSICIAN ASSISTANT

## 2024-11-22 PROCEDURE — 36415 COLL VENOUS BLD VENIPUNCTURE: CPT | Performed by: PHYSICIAN ASSISTANT

## 2024-11-22 PROCEDURE — 85025 COMPLETE CBC W/AUTO DIFF WBC: CPT | Performed by: PHYSICIAN ASSISTANT

## 2024-11-22 PROCEDURE — 76815 OB US LIMITED FETUS(S): CPT

## 2024-11-22 PROCEDURE — 76770 US EXAM ABDO BACK WALL COMP: CPT

## 2024-11-22 PROCEDURE — 87661 TRICHOMONAS VAGINALIS AMPLIF: CPT | Mod: AHULAB | Performed by: PHYSICIAN ASSISTANT

## 2024-11-22 PROCEDURE — 80053 COMPREHEN METABOLIC PANEL: CPT | Performed by: PHYSICIAN ASSISTANT

## 2024-11-22 PROCEDURE — 76815 OB US LIMITED FETUS(S): CPT | Performed by: RADIOLOGY

## 2024-11-22 PROCEDURE — 93975 VASCULAR STUDY: CPT

## 2024-11-22 PROCEDURE — 2500000001 HC RX 250 WO HCPCS SELF ADMINISTERED DRUGS (ALT 637 FOR MEDICARE OP): Performed by: PHYSICIAN ASSISTANT

## 2024-11-22 PROCEDURE — 99285 EMERGENCY DEPT VISIT HI MDM: CPT | Mod: 25

## 2024-11-22 PROCEDURE — 76770 US EXAM ABDO BACK WALL COMP: CPT | Mod: 59

## 2024-11-22 PROCEDURE — 76705 ECHO EXAM OF ABDOMEN: CPT

## 2024-11-22 RX ORDER — AMOXICILLIN AND CLAVULANATE POTASSIUM 875; 125 MG/1; MG/1
1 TABLET, FILM COATED ORAL EVERY 12 HOURS
Qty: 14 TABLET | Refills: 0 | Status: SHIPPED | OUTPATIENT
Start: 2024-11-22 | End: 2024-11-29

## 2024-11-22 RX ORDER — CEPHALEXIN 500 MG/1
500 CAPSULE ORAL ONCE
Status: COMPLETED | OUTPATIENT
Start: 2024-11-22 | End: 2024-11-22

## 2024-11-22 RX ORDER — ACETAMINOPHEN 325 MG/1
975 TABLET ORAL ONCE
Status: COMPLETED | OUTPATIENT
Start: 2024-11-22 | End: 2024-11-22

## 2024-11-22 ASSESSMENT — PAIN DESCRIPTION - DESCRIPTORS: DESCRIPTORS: SHARP;CRAMPING

## 2024-11-22 ASSESSMENT — PAIN DESCRIPTION - FREQUENCY: FREQUENCY: CONSTANT/CONTINUOUS

## 2024-11-22 ASSESSMENT — PAIN - FUNCTIONAL ASSESSMENT: PAIN_FUNCTIONAL_ASSESSMENT: 0-10

## 2024-11-22 ASSESSMENT — PAIN DESCRIPTION - ORIENTATION: ORIENTATION: RIGHT

## 2024-11-22 ASSESSMENT — PAIN SCALES - GENERAL: PAINLEVEL_OUTOF10: 7

## 2024-11-22 ASSESSMENT — PAIN DESCRIPTION - LOCATION: LOCATION: ABDOMEN

## 2024-11-22 ASSESSMENT — PAIN DESCRIPTION - PAIN TYPE: TYPE: ACUTE PAIN

## 2024-11-22 NOTE — Clinical Note
Argelia Valentine was seen and treated in our emergency department on 11/22/2024.  She may return to work on 11/25/2024.       If you have any questions or concerns, please don't hesitate to call.      Karson Lucero MD

## 2024-11-22 NOTE — DISCHARGE INSTRUCTIONS
Please begin taking Augmentin.  Take this medication twice per day for the next 5 days.  Complete full course of antibiotics even if symptoms improve.  Follow-up with Dr. Navarro next week.  We did send him a secure message to let them know you are in the ER today.  Return to ER for any new or worsening symptoms such as pain that is not improving, dizziness, passing out, vaginal bleeding, vomiting, fever that will not improve with medicine or anything else concerning to you.

## 2024-11-22 NOTE — ED PROVIDER NOTES
Chief Complaint   Patient presents with    Abdominal Pain     HPI:   Argelia Valentine is a A1 roughly 14-week pregnant (LMP 2024) 24 y.o. female that denies any significant prior medical history who presents to the ED for evaluation of right sided low back, flank and abdominal pain x 3 days.  She said has been getting progressively worse.  Rates it 7/10.  Cannot identify any aggravating or relieving factors.  Has not tried any medication for her pain.  Endorses associated nausea without vomiting.  Denies any dysuria, hematuria, vaginal discharge, chance of STI, fever, chills, chest pain, shortness of breath, vaginal bleeding.  Has not spoken to her OB about this.  Is not taking prenatal vitamins because they are too large.    Medications:  Soc HX:  Allergies   Allergen Reactions    Sumatriptan Anaphylaxis   :  Past Medical History:   Diagnosis Date    COVID 2024    Encounter for contraceptive management, unspecified 2018    Contraception management    Encounter for initial prescription of contraceptive pills 2016    Encounter for initial prescription of contraceptive pills    Encounter for initial prescription of injectable contraceptive 10/12/2020    Encounter for initial prescription of injectable contraceptive    Encounter for other specified surgical aftercare 10/01/2020    Postoperative visit    Encounter for other specified surgical aftercare 10/01/2020    Postoperative visit    Encounter for pregnancy test, result negative 2021    Negative pregnancy test    Encounter for pregnancy test, result positive ([facility])     Positive pregnancy test    Encounter for removal of intrauterine contraceptive device 2017    Encounter for removal of intrauterine contraceptive device    Encounter for routine postpartum follow-up 2018    Postpartum exam    Encounter for routine postpartum follow-up 2017    Postpartum examination following  delivery    Encounter for screening  for infections with a predominantly sexual mode of transmission 11/15/2022    Screen for STD (sexually transmitted disease)    Encounter for supervision of normal pregnancy, unspecified, second trimester 2017    Second trimester pregnancy    Encounter for supervision of normal pregnancy, unspecified, second trimester 2017    Second trimester pregnancy    Encounter for supervision of normal pregnancy, unspecified, third trimester 2018    Third trimester pregnancy    Fatigue 2024    Gonococcal infection of lower genitourinary tract with periurethral and accessory gland abscess 2021    Infection of lower genitourinary tract with periurethral gland abscess due to Neisseria gonorrhea    Low back pain, unspecified 06/15/2022    Maternal care for unspecified type scar from previous  delivery ([facility])     Previous  section complicating pregnancy    Nausea and vomiting 10/12/2022    Other acute postprocedural pain 2020    Postoperative pain    Other conditions influencing health status 2015    History of pregnancy    Personal history of other complications of pregnancy, childbirth and the puerperium 2018    History of postpartum depression    Personal history of other diseases of the female genital tract 2017    History of amenorrhea    Personal history of other infectious and parasitic diseases 2021    History of trichomonal vaginitis    Streptococcus, group b, as the cause of diseases classified elsewhere     Group beta Strep positive    Thumbnail injury 2024     Past Surgical History:   Procedure Laterality Date    OTHER SURGICAL HISTORY  10/27/2020    Dilation and curettage    OTHER SURGICAL HISTORY  10/27/2020     section     Family History   Problem Relation Name Age of Onset    No Known Problems Mother      COPD Father      Stroke Sister      Hypertension Sister      Heart disease Sister        Physical Exam  Vitals and nursing  note reviewed.   Constitutional:       General: She is not in acute distress.     Appearance: Normal appearance. She is not ill-appearing or toxic-appearing.   HENT:      Right Ear: External ear normal.      Left Ear: External ear normal.   Cardiovascular:      Rate and Rhythm: Normal rate and regular rhythm.      Pulses: Normal pulses.      Heart sounds: Normal heart sounds.   Pulmonary:      Effort: Pulmonary effort is normal.      Breath sounds: Normal breath sounds.   Abdominal:      General: Bowel sounds are normal.      Palpations: Abdomen is soft.      Tenderness: There is abdominal tenderness in the right lower quadrant. There is no right CVA tenderness, left CVA tenderness, guarding or rebound. Positive signs include McBurney's sign. Negative signs include Lazo's sign.      Comments: Gravid   Musculoskeletal:         General: No deformity or signs of injury. Normal range of motion.      Cervical back: Normal range of motion.   Lymphadenopathy:      Cervical: No cervical adenopathy.   Skin:     General: Skin is warm and dry.      Findings: No bruising.   Neurological:      Mental Status: She is alert.      Cranial Nerves: No cranial nerve deficit.     VS: As documented in the triage note and EMR flowsheet from this visit were reviewed.    External Records Reviewed: I reviewed recent and relevant outside records including: Reviewed ultrasound from 11/15/2024.  IUP.  Fetal heart rate 148 bpm.  Reviewed GYN note 11/7/2024.  Patient seen to establish care for pregnancy.  He was advised to start taking daily ASA      Medical Decision Making:   ED Course as of 11/22/24 1758   Fri Nov 22, 2024   1256 Vitals Reviewed: Afebrile. Normotensive. Not tachycardic nor tachypneic. No hypoxia.   [KA]   1305 Patient is 24-year-old female presents to the ED for evaluation of right-sided back, flank and lower quadrant pain.  14 weeks pregnant.  Minimal right lower quadrant abdominal pain on exam.  Negative Lazo.  Tender  McBurney point.  No CVA tenderness.  Not complaining of any vaginal bleeding or urinary symptoms.  DDx includes, but is not limited to, ovarian torsion, urolithiasis, appendicitis, cystitis, round ligament pain.  Less likely ectopic as she has already had confirmed IUP.  Less likely miscarriage as she is not experiencing any vaginal bleeding or passing clots. [KA]   1441 I personally reviewed labs.  CBC shows microcytic anemia with hemoglobin 8.6.  This is similar to her baseline.  CMP shows normal renal function, normal electrolytes normal liver function.  Urinalysis positive for leukocytes, WBCs.  Negative for nitrites. [KA]   1442 FINDINGS:  On survey of the right lower quadrant the appendix was not  identified. No free fluid was noted. A few inguinal lymph nodes are  noted which are nonenlarged and have intact hilar architecture.  Gravid uterus was incidentally visible.   [KA]   1442 IMPRESSION:  3 mm nonobstructing stone left kidney    Does not explain right-sided symptoms. [KA]   1719 IMPRESSION:  1. Single live intrauterine gestation corresponding to a gestational  age of 13 weeks 6 days.Evaluation of fetal anatomy was not performed.  Recommend continued routine follow-up prenatal imaging evaluation.  2. No sonographic evidence of ovarian torsion.       [KA]   1750 Serial abdominal exam.  Patient still without CVA tenderness.  Slight right lower quadrant tenderness to palpation without guarding does improve when uterus is lifted.  Possibly round ligament pain.  Has been able to tolerate p.o. intake without emesis.  Patient will be initiated on Augmentin twice daily x 5 days for UTI and pregnancy.  We discussed very strict return precautions and advised that she should return to ED for any new or worsening symptoms.  She is agreeable with plan. [KA]   0588 Sent secure message to patient's PCP to ensure that she has close follow-up appointment made available for her. [KA]      ED Course User Index  [KA] Larisa  ALVERTO Sy PA-C         Diagnoses as of 11/22/24 1758   RLQ abdominal pain   Second trimester pregnancy ([facility])   Cystitis of pregnancy in second trimester ([facility])      Chronic Medical Conditions Significantly Affecting Care:      Escalation of Care: Appropriate for outpatient mgmt      Prescription Drug Consideration: Pregnant      Discussion of Management with Other Providers:  I discussed the patient/results with: Attending Nic    Counseling: Spoke with the patient and discussed today´s findings, in addition to providing specific details for the plan of care and expected course.  Patient was given the opportunity to ask questions.    Discussed return precautions and importance of follow-up.  Advised to follow-up with OB/GYN.  Advised to return to the ED for changing or worsening symptoms, new symptoms, complaint specific precautions, and precautions listed on the discharge paperwork.  Educated on the common potential side effects of medications prescribed.    I advised the patient that the emergency evaluation and treatment provided today doesn't end their need for medical care. It is very important that they follow-up with their primary care provider or other specialist as instructed.    The plan of care was mutually agreed upon with the patient. The patient and/or family were given the opportunity to ask questions. All questions asked today in the ED were answered to the best of my ability with today's information.    I specifically advised the patient to return to the ED for changing or worsening symptoms, worrisome new symptoms, or for any complaint specific precautions listed on the discharge paperwork.    This patient was cared for in the setting of nationwide stress on resources and staffing.    This report was transcribed using voice recognition software.  Every effort was made to ensure accuracy, however, inadvertently computerized transcription errors may be present.       Larisa Sy,  RON  11/22/24 3927

## 2024-11-22 NOTE — ED TRIAGE NOTES
Pt c/o RLQ abdominal pain radiating to the right flank that started 2 days ago. Pt denies urinary sx or n/v/d. Pt is currently 14 weeks pregnant and denies any vaginal bleeding.

## 2024-11-23 LAB
HOLD SPECIMEN: NORMAL
T VAGINALIS RRNA SPEC QL NAA+PROBE: NEGATIVE

## 2024-11-24 LAB — BACTERIA UR CULT: NORMAL

## 2024-12-05 ENCOUNTER — APPOINTMENT (OUTPATIENT)
Dept: OBSTETRICS AND GYNECOLOGY | Facility: CLINIC | Age: 24
End: 2024-12-05
Payer: COMMERCIAL

## 2024-12-05 VITALS — DIASTOLIC BLOOD PRESSURE: 68 MMHG | SYSTOLIC BLOOD PRESSURE: 107 MMHG | BODY MASS INDEX: 28.52 KG/M2 | WEIGHT: 161 LBS

## 2024-12-05 DIAGNOSIS — Z3A.15 15 WEEKS GESTATION OF PREGNANCY (HHS-HCC): Primary | ICD-10-CM

## 2024-12-05 DIAGNOSIS — O10.919 CHRONIC HYPERTENSION IN PREGNANCY (HHS-HCC): ICD-10-CM

## 2024-12-05 DIAGNOSIS — M62.89 PFD (PELVIC FLOOR DYSFUNCTION): ICD-10-CM

## 2024-12-05 LAB
POC BLOOD, URINE: NEGATIVE
POC GLUCOSE, URINE: NEGATIVE MG/DL
POC KETONES, URINE: NEGATIVE MG/DL
POC LEUKOCYTES, URINE: ABNORMAL
POC NITRITE,URINE: NEGATIVE
POC PROTEIN, URINE: ABNORMAL MG/DL

## 2024-12-05 PROCEDURE — 99213 OFFICE O/P EST LOW 20 MIN: CPT | Performed by: OBSTETRICS & GYNECOLOGY

## 2024-12-05 NOTE — PROGRESS NOTES
Ob Follow-up  24     SUBJECTIVE      HPI: Argelia Valentine is a 24 y.o.  at 15w5d here for RPNV.  She has -contractions, -bleeding, or -LOF. Reports ?normal fetal movement. Patient reports back pain and headache       OBJECTIVE  Visit Vitals  /68   Wt 73 kg (161 lb)   LMP 2024   BMI 28.52 kg/m²   OB Status Pregnant   Smoking Status Some Days   BSA 1.8 m²            ASSESSMENT & PLAN    Argelia Valentine is a 24 y.o.  at 15w5d here for the following concerns we addressed today:    Problem List Items Addressed This Visit       15 weeks gestation of pregnancy ([facility])    Overview     Desired provider in labor: [] CNM  [] Physician  [x] Blood Products: [x] Yes, accepts [] No, needs counseling  [x] Initial BMI: Could not be calculated   [x] Prenatal Labs:   [x] Cervical Cancer Screening up to date  [x] Rh status: b+  [x] Genetic Screening:  Ordered today  [x] NT US: (11-13 wks)  [x] Baby ASA (if indicated):  [] Pregnancy dated by:     [x] Anatomy US: (19-20 wks) ordered  [] Federal Sterilization consent signed (if indicated):  [] 1hr GCT at 24-28wks:  [] Rhogam (if indicated):   [] Fetal Surveillance (if indicated):  [] Tdap (27-32 wks, may be given up to 36 wks if initial window missed):   [] RSV (32-36 wks) (Sept. to end of ):   [] Flu Vaccine: declines    [x] Breastfeeding: Breast and formula  [] Postpartum Birth control method: Undecided  [x] GBS at 36 - 37 wks:  [] 39 weeks discussion of IOL vs. Expectant management:  [] Mode of delivery ( anticipated ):           Chronic hypertension in pregnancy ([facility]) - Primary    Overview     Not on meds  Begin          PFD (pelvic floor dysfunction)         No orders of the defined types were placed in this encounter.       RTC in 4 weeks      Eric Navarro MD

## 2024-12-30 ENCOUNTER — HOSPITAL ENCOUNTER (OUTPATIENT)
Dept: RADIOLOGY | Facility: CLINIC | Age: 24
Discharge: HOME | End: 2024-12-30
Payer: COMMERCIAL

## 2024-12-30 DIAGNOSIS — Z03.73 FETAL ANOMALY SUSPECTED BUT NOT FOUND: ICD-10-CM

## 2024-12-30 DIAGNOSIS — O10.919 CHRONIC HYPERTENSION AFFECTING PREGNANCY (HHS-HCC): ICD-10-CM

## 2024-12-30 DIAGNOSIS — O20.0 THREATENED MISCARRIAGE (HHS-HCC): ICD-10-CM

## 2024-12-30 PROCEDURE — 76805 OB US >/= 14 WKS SNGL FETUS: CPT

## 2024-12-30 PROCEDURE — 76811 OB US DETAILED SNGL FETUS: CPT | Performed by: STUDENT IN AN ORGANIZED HEALTH CARE EDUCATION/TRAINING PROGRAM

## 2025-01-02 ENCOUNTER — APPOINTMENT (OUTPATIENT)
Dept: OBSTETRICS AND GYNECOLOGY | Facility: CLINIC | Age: 25
End: 2025-01-02
Payer: COMMERCIAL

## 2025-01-02 VITALS — SYSTOLIC BLOOD PRESSURE: 106 MMHG | BODY MASS INDEX: 29.05 KG/M2 | DIASTOLIC BLOOD PRESSURE: 72 MMHG | WEIGHT: 164 LBS

## 2025-01-02 DIAGNOSIS — O10.919 CHRONIC HYPERTENSION IN PREGNANCY (HHS-HCC): ICD-10-CM

## 2025-01-02 DIAGNOSIS — Z3A.19 19 WEEKS GESTATION OF PREGNANCY (HHS-HCC): Primary | ICD-10-CM

## 2025-01-02 DIAGNOSIS — O34.219 HISTORY OF CESAREAN DELIVERY, CURRENTLY PREGNANT (HHS-HCC): ICD-10-CM

## 2025-01-02 PROCEDURE — 99213 OFFICE O/P EST LOW 20 MIN: CPT | Performed by: OBSTETRICS & GYNECOLOGY

## 2025-01-02 NOTE — PROGRESS NOTES
Ob Follow-up  25     SUBJECTIVE      HPI: Argelia Valentine is a 24 y.o.  at 19w5d here for RPNV.  She has -contractions, -bleeding, or -LOF. Reports +normal fetal movement. Patient reports feeling well       OBJECTIVE  Visit Vitals  /72   Wt 74.4 kg (164 lb)   LMP 2024   BMI 29.05 kg/m²   OB Status Pregnant   Smoking Status Some Days   BSA 1.82 m²            ASSESSMENT & PLAN    Argelia Valentine is a 24 y.o.  at 19w5d here for the following concerns we addressed today:    Problem List Items Addressed This Visit       19 weeks gestation of pregnancy ([facility])    Overview     Desired provider in labor: [] CNM  [] Physician  [x] Blood Products: [x] Yes, accepts [] No, needs counseling  [x] Initial BMI: Could not be calculated   [x] Prenatal Labs:   [x] Cervical Cancer Screening up to date  [x] Rh status: b+  [x] Genetic Screening:  Ordered today  [x] NT US: (11-13 wks)  [x] Baby ASA (if indicated):  [] Pregnancy dated by:     [x] Anatomy US: (19-20 wks) ordered  [] Federal Sterilization consent signed (if indicated):  [] 1hr GCT at 24-28wks:  [] Rhogam (if indicated):   [] Fetal Surveillance (if indicated):  [] Tdap (27-32 wks, may be given up to 36 wks if initial window missed):   [] RSV (32-36 wks) (Sept. to end of ):   [] Flu Vaccine: declines    [x] Breastfeeding: Breast and formula  [] Postpartum Birth control method: Undecided  [x] GBS at 36 - 37 wks:  [] 39 weeks discussion of IOL vs. Expectant management:  [] Mode of delivery ( anticipated ):           Chronic hypertension in pregnancy ([facility]) - Primary    Overview     Not on meds  Begin          History of  delivery, currently pregnant ([facility])    Overview     History of  x 3  Will need repeat  delivery                No orders of the defined types were placed in this encounter.       RTC in 4 weeks      Eric Navarro MD

## 2025-01-14 ENCOUNTER — EVALUATION (OUTPATIENT)
Dept: PHYSICAL THERAPY | Facility: CLINIC | Age: 25
End: 2025-01-14
Payer: COMMERCIAL

## 2025-01-14 DIAGNOSIS — M62.89 MUSCLE TIGHTNESS: ICD-10-CM

## 2025-01-14 DIAGNOSIS — G89.29 CHRONIC BILATERAL LOW BACK PAIN WITHOUT SCIATICA: ICD-10-CM

## 2025-01-14 DIAGNOSIS — M54.50 CHRONIC BILATERAL LOW BACK PAIN WITHOUT SCIATICA: ICD-10-CM

## 2025-01-14 DIAGNOSIS — M62.89 PFD (PELVIC FLOOR DYSFUNCTION): Primary | ICD-10-CM

## 2025-01-14 DIAGNOSIS — M62.81 MUSCLE WEAKNESS: ICD-10-CM

## 2025-01-14 PROCEDURE — 97110 THERAPEUTIC EXERCISES: CPT | Mod: GP | Performed by: PHYSICAL THERAPIST

## 2025-01-14 PROCEDURE — 97535 SELF CARE MNGMENT TRAINING: CPT | Mod: GP | Performed by: PHYSICAL THERAPIST

## 2025-01-14 PROCEDURE — 97162 PT EVAL MOD COMPLEX 30 MIN: CPT | Mod: GP | Performed by: PHYSICAL THERAPIST

## 2025-01-14 NOTE — PROGRESS NOTES
Physical Therapy  Physical Therapy Pelvic Floor Evaluation    Name: Argelia Valentine  MRN: [id number]  : 2000  Encounter Date: 2025  Visit Count: 1     Time Calculation  Start Time: 335  Stop Time: 430  Time Calculation (min): 55 min    Insurance: Macrina Carter  Visit # 1 of [place] for     Current Problem:  1. PFD (pelvic floor dysfunction)  Referral to Physical Therapy      2. Chronic bilateral low back pain without sciatica        3. Muscle tightness        4. Muscle weakness            General  Reason for Referral: PFD  Referred By: Ramon  Precautions  STEADI Fall Risk Score (The score of 4 or more indicates an increased risk of falling): 0  Precautions Comment: pregnant  Vital Signs     Pain       Subjective  Chief Complaint: 21 weeks, 4 days  - severe back pain, across entire lower back + buttocks both sides + wrap around into the groin area  - babe sitting low  - kidney stones are new (ER said she had kidney stones)  - pregnancy related thirst (6x16oz)  Onset:  2 months  CORBY: unknown    Current Condition: worse    PAIN  Intensity (0-10): 6  Location: LBP  Description: achy    Aggravating Factors: moving too much, prolonged positioning (every 20 minutes)  Relieving Factors hot showers    Relevant Information (PMH & Previous Tests/Imaging): see chart  Previous Interventions/Treatments: see chart    Prior Level of Function (PLOF)  Exercise/Physical Activity: walks (30 min)  Work/School: student - cosmetology (ipad)    Treatment Goals: reduce pelvic and back pain    Cultural or Spiritual Practices: no  History of Trauma: PTSD  Safe at Home:  yes    OB/GYN HISTORY:   Pregnancies (): 6   Births (Para): 3   = 3 (emergency )    Painful [place] or vaginal penetration? Yes in the past, PFD in the past    BLADDER  Frequency of Urination  Daytime: every hour  Nighttime: 2-3x  Screening = Recurrent infections/UTIs? Last ED visit  Other Symptoms   Trouble initiating urine stream     Urine stream is intermittent or slow   X Trouble emptying bladder completely    Dribbling after urination    Straining or pushing to empty    Trouble feeling bladder urge/fullness   X Trouble holding urine when you get an urge   Urinary Incontinence/Leakage  Present with cough and/or sneeze? Not yet  Present with physical activity and/or exertion? Yes  Do you wear any barriers, such as pantishields or pads? Some days  Average Fluid Intake (glasses/day): water, 6x16oz, tea: peppermint, herbal/lemon cordelia, sweet tea, pop/gingerale     BOWEL  Frequency of Bowel Movements: every 2-3 days  Management Techniques: nothing (not sitting on the toilet long enough)  Average Fiber Intake (per day): fruit    Objective  Patient was educated on pelvic floor anatomy, function, and dysfunction.   Patient was educated on an orthopedic assessment & external pelvic floor assessment technique and verbalized consent.   The patient is aware they have full autonomy and can stop the assessment at any time.     Standing Assessment:   Posture: forward head, rounded shoulders, increased lumbar lordosis  DL Squat: sit to stand, bi ue support  Standing Lumbar Mobility: mod limitation  Standing Palpation: QL/Lumbar thoracic paraspinal tightness/tenderness    Diaphragmatic Breathing: poor  Rib Palpation/Positioning: down & tightness  Assess Abdomen  Diastasis Recti: negative  Transverse Abdominus Contraction: fair with cuing    Palpation:   Lumbar Paraspinals: tightness/tenderness  PA lumbar mobility: hypermobility  PA thoracic/ribcage mobility: hypomobility  Quadratus Lumborum: tightness/tenderness    OUTCOMES MEASURE:  Fab Pelvic Dysfunction Screening Tool : positive    NIH-CPSI  -Pain: 0  - Urinary Symp: 6  - QOL: 6    Goals:   Active       PT Problem       PT Goal 1       Start:  01/14/25    Expected End:  04/14/25       Patient will return to prior level of function with minimal to no pain and without limitations for participation in  ADLs, health, and exercise.   Patient demonstrate home exercise program adherence to supplement symptom reduction and functional gains made in clinic  Patient will reports minimal to no pain for participation in ADLs and return to PLOF.   Patient will demonstrate coordination of pelvic floor, strength & relaxation wnl for return to ADLs and PLOF without restriction.   Pt will demonstrate improvement on the outcome measure score to demonstrate overall improved functional abilities and quality of life.              Education: home exercise program, plan of care, activity modifications, pain management, and injury pathology  Bladder Habits: Just in Case Pee, Hover over the toilet, relax & breathe, squatty potty use  Hydration Recommendation: 50% of your body wt (pounds) in fluid ounces  Bladder Irritants: caffeine, artificial sweeteners, carbonated beverages, alcohol  Fiber Intake Recommendation: 25-30g/day    Treatments:   Education: home exercise program, plan of care, activity modifications, pain management, and injury pathology    Access Code: [email or web address]  - Seated Diaphragmatic Breathing  - 1-3 x daily - 3-5 breaths hold  - Seated Transversus Abdominis Bracing  - 1-3 x daily - 3-5 reps  - Supine Pelvic Floor Stretch  - 3-5 x weekly - 3 sets - 5 breaths hold  - Supine Butterfly Groin Stretch  - 3-5 x weekly - 3 sets - 20 seconds hold  - Cat Cow  - 3-5 x weekly - 10 reps  - Child's Pose Stretch  - 3-5 x weekly - 1-3 minutes hold    Tx Codes:    Eval Mod   Self Care x1   There Exx1    Plan for Next Visit:   Diaphragmatic breathing  STM/MFR: lumbar, stretch hips  Support and strengthen core/hips    Assessment: Patient presents with signs and symptoms consistent with Pelvic floor dysfunction, lower back pain, muscle tightness, muscle weakness, resulting in limited participation in pain-free ADLs and inability to perform at their prior level of function. Pt would benefit from physical therapy to address the impairments  found & listed previously in the objective section in order to return to safe and pain-free ADLs and prior level of function.   PT Assessment Results: muscle tightness, muscle weakness   Rehab Prognosis: good   Clinical Presentation: stable    Plan:   Planned Interventions include: therapeutic exercise, self-care home management, manual therapy, therapeutic activities, gait training, neuromuscular coordination, aquatic therapy  Patient and/or family understands and agrees with goals and plan documented: yes  Frequency: 2x/month  Duration: 1-3 months     Lily Jaimes, PT

## 2025-01-25 ENCOUNTER — HOSPITAL ENCOUNTER (EMERGENCY)
Facility: HOSPITAL | Age: 25
Discharge: HOME | End: 2025-01-25
Attending: EMERGENCY MEDICINE
Payer: COMMERCIAL

## 2025-01-25 ENCOUNTER — HOSPITAL ENCOUNTER (OUTPATIENT)
Facility: HOSPITAL | Age: 25
Discharge: HOME | End: 2025-01-25
Attending: OBSTETRICS & GYNECOLOGY | Admitting: OBSTETRICS & GYNECOLOGY
Payer: COMMERCIAL

## 2025-01-25 ENCOUNTER — APPOINTMENT (OUTPATIENT)
Dept: RADIOLOGY | Facility: HOSPITAL | Age: 25
End: 2025-01-25
Payer: COMMERCIAL

## 2025-01-25 ENCOUNTER — HOSPITAL ENCOUNTER (OUTPATIENT)
Facility: HOSPITAL | Age: 25
End: 2025-01-25
Attending: OBSTETRICS & GYNECOLOGY | Admitting: OBSTETRICS & GYNECOLOGY
Payer: COMMERCIAL

## 2025-01-25 VITALS
TEMPERATURE: 97.3 F | SYSTOLIC BLOOD PRESSURE: 101 MMHG | OXYGEN SATURATION: 100 % | HEIGHT: 60 IN | WEIGHT: 164.31 LBS | DIASTOLIC BLOOD PRESSURE: 54 MMHG | HEART RATE: 73 BPM | BODY MASS INDEX: 32.26 KG/M2

## 2025-01-25 VITALS
OXYGEN SATURATION: 100 % | TEMPERATURE: 98.6 F | WEIGHT: 164 LBS | HEIGHT: 60 IN | SYSTOLIC BLOOD PRESSURE: 112 MMHG | HEART RATE: 80 BPM | RESPIRATION RATE: 17 BRPM | DIASTOLIC BLOOD PRESSURE: 60 MMHG | BODY MASS INDEX: 32.2 KG/M2
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
## 2025-01-25 DIAGNOSIS — Z34.93 THIRD TRIMESTER PREGNANCY (HHS-HCC): ICD-10-CM
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
## 2025-01-25 LAB
ABO GROUP (TYPE) IN BLOOD: NORMAL
ALBUMIN SERPL BCP-MCNC: 3 G/DL (ref 3.4–5)
ALP SERPL-CCNC: 83 U/L (ref 33–110)
ALT SERPL W P-5'-P-CCNC: 16 U/L (ref 7–45)
AMORPH CRY #/AREA UR COMP ASSIST: ABNORMAL /HPF
ANION GAP SERPL CALC-SCNC: 9 MMOL/L (ref 10–20)
ANTIBODY SCREEN: NORMAL
APPEARANCE UR: ABNORMAL
APTT PPP: 27 SECONDS (ref 27–38)
AST SERPL W P-5'-P-CCNC: 22 U/L (ref 9–39)
BASOPHILS # BLD AUTO: 0.02 X10*3/UL (ref 0–0.1)
BASOPHILS NFR BLD AUTO: 0.3 %
BILIRUB SERPL-MCNC: 0.2 MG/DL (ref 0–1.2)
BILIRUB UR STRIP.AUTO-MCNC: NEGATIVE MG/DL
BUN SERPL-MCNC: 4 MG/DL (ref 6–23)
CALCIUM SERPL-MCNC: 8.6 MG/DL (ref 8.6–10.3)
CHLORIDE SERPL-SCNC: 108 MMOL/L (ref 98–107)
CO2 SERPL-SCNC: 22 MMOL/L (ref 21–32)
COLOR UR: YELLOW
CREAT SERPL-MCNC: 0.52 MG/DL (ref 0.5–1.05)
EGFRCR SERPLBLD CKD-EPI 2021: >90 ML/MIN/1.73M*2
EOSINOPHIL # BLD AUTO: 0.05 X10*3/UL (ref 0–0.7)
EOSINOPHIL NFR BLD AUTO: 0.7 %
ERYTHROCYTE [DISTWIDTH] IN BLOOD BY AUTOMATED COUNT: 18.3 % (ref 11.5–14.5)
GLUCOSE SERPL-MCNC: 89 MG/DL (ref 74–99)
GLUCOSE UR STRIP.AUTO-MCNC: NORMAL MG/DL
HCT VFR BLD AUTO: 25.1 % (ref 36–46)
HGB BLD-MCNC: 7.3 G/DL (ref 12–16)
IMM GRANULOCYTES # BLD AUTO: 0.02 X10*3/UL (ref 0–0.7)
IMM GRANULOCYTES NFR BLD AUTO: 0.3 % (ref 0–0.9)
INR PPP: 1 (ref 0.9–1.1)
KETONES UR STRIP.AUTO-MCNC: NEGATIVE MG/DL
LEUKOCYTE ESTERASE UR QL STRIP.AUTO: ABNORMAL
LIPASE SERPL-CCNC: 15 U/L (ref 9–82)
LYMPHOCYTES # BLD AUTO: 2 X10*3/UL (ref 1.2–4.8)
LYMPHOCYTES NFR BLD AUTO: 27.4 %
MCH RBC QN AUTO: 19.4 PG (ref 26–34)
MCHC RBC AUTO-ENTMCNC: 29.1 G/DL (ref 32–36)
MCV RBC AUTO: 67 FL (ref 80–100)
MONOCYTES # BLD AUTO: 0.49 X10*3/UL (ref 0.1–1)
MONOCYTES NFR BLD AUTO: 6.7 %
MUCOUS THREADS #/AREA URNS AUTO: ABNORMAL /LPF
NEUTROPHILS # BLD AUTO: 4.72 X10*3/UL (ref 1.2–7.7)
NEUTROPHILS NFR BLD AUTO: 64.6 %
NITRITE UR QL STRIP.AUTO: NEGATIVE
NRBC BLD-RTO: 0 /100 WBCS (ref 0–0)
PH UR STRIP.AUTO: 8 [PH]
PLATELET # BLD AUTO: 249 X10*3/UL (ref 150–450)
POTASSIUM SERPL-SCNC: 3.9 MMOL/L (ref 3.5–5.3)
PROT SERPL-MCNC: 6.5 G/DL (ref 6.4–8.2)
PROT UR STRIP.AUTO-MCNC: NEGATIVE MG/DL
PROTHROMBIN TIME: 10.8 SECONDS (ref 9.8–12.8)
RBC # BLD AUTO: 3.77 X10*6/UL (ref 4–5.2)
RBC # UR STRIP.AUTO: NEGATIVE /UL
RBC #/AREA URNS AUTO: ABNORMAL /HPF
RH FACTOR (ANTIGEN D): NORMAL
SODIUM SERPL-SCNC: 135 MMOL/L (ref 136–145)
SP GR UR STRIP.AUTO: 1.02
SQUAMOUS #/AREA URNS AUTO: ABNORMAL /HPF
UROBILINOGEN UR STRIP.AUTO-MCNC: NORMAL MG/DL
WBC # BLD AUTO: 7.3 X10*3/UL (ref 4.4–11.3)
WBC #/AREA URNS AUTO: ABNORMAL /HPF

## 2025-01-25 PROCEDURE — 85610 PROTHROMBIN TIME: CPT | Performed by: EMERGENCY MEDICINE

## 2025-01-25 PROCEDURE — 86901 BLOOD TYPING SEROLOGIC RH(D): CPT | Performed by: EMERGENCY MEDICINE

## 2025-01-25 PROCEDURE — 99214 OFFICE O/P EST MOD 30 MIN: CPT | Performed by: OBSTETRICS & GYNECOLOGY

## 2025-01-25 PROCEDURE — 81003 URINALYSIS AUTO W/O SCOPE: CPT | Performed by: EMERGENCY MEDICINE

## 2025-01-25 PROCEDURE — 99284 EMERGENCY DEPT VISIT MOD MDM: CPT | Mod: 25 | Performed by: EMERGENCY MEDICINE

## 2025-01-25 PROCEDURE — 59025 FETAL NON-STRESS TEST: CPT | Performed by: OBSTETRICS & GYNECOLOGY

## 2025-01-25 PROCEDURE — 71045 X-RAY EXAM CHEST 1 VIEW: CPT

## 2025-01-25 PROCEDURE — 85025 COMPLETE CBC W/AUTO DIFF WBC: CPT | Performed by: EMERGENCY MEDICINE

## 2025-01-25 PROCEDURE — 36415 COLL VENOUS BLD VENIPUNCTURE: CPT | Performed by: EMERGENCY MEDICINE

## 2025-01-25 PROCEDURE — 71045 X-RAY EXAM CHEST 1 VIEW: CPT | Performed by: RADIOLOGY

## 2025-01-25 PROCEDURE — 83690 ASSAY OF LIPASE: CPT | Performed by: EMERGENCY MEDICINE

## 2025-01-25 PROCEDURE — 99212 OFFICE O/P EST SF 10 MIN: CPT

## 2025-01-25 PROCEDURE — 87086 URINE CULTURE/COLONY COUNT: CPT | Mod: AHULAB | Performed by: EMERGENCY MEDICINE

## 2025-01-25 PROCEDURE — 80053 COMPREHEN METABOLIC PANEL: CPT | Performed by: EMERGENCY MEDICINE

## 2025-01-25 PROCEDURE — 85730 THROMBOPLASTIN TIME PARTIAL: CPT | Performed by: EMERGENCY MEDICINE

## 2025-01-25 SDOH — SOCIAL STABILITY: SOCIAL INSECURITY: PHYSICAL ABUSE: DENIES

## 2025-01-25 SDOH — HEALTH STABILITY: MENTAL HEALTH: WERE YOU ABLE TO COMPLETE ALL THE BEHAVIORAL HEALTH SCREENINGS?: YES

## 2025-01-25 SDOH — SOCIAL STABILITY: SOCIAL INSECURITY: HAS ANYONE EVER THREATENED TO HURT YOUR FAMILY OR YOUR PETS?: NO

## 2025-01-25 SDOH — HEALTH STABILITY: MENTAL HEALTH: WISH TO BE DEAD (PAST 1 MONTH): NO

## 2025-01-25 SDOH — SOCIAL STABILITY: SOCIAL INSECURITY: HAVE YOU HAD ANY THOUGHTS OF HARMING ANYONE ELSE?: NO

## 2025-01-25 SDOH — SOCIAL STABILITY: SOCIAL INSECURITY: ARE THERE ANY APPARENT SIGNS OF INJURIES/BEHAVIORS THAT COULD BE RELATED TO ABUSE/NEGLECT?: NO

## 2025-01-25 SDOH — ECONOMIC STABILITY: HOUSING INSECURITY: DO YOU FEEL UNSAFE GOING BACK TO THE PLACE WHERE YOU ARE LIVING?: NO

## 2025-01-25 SDOH — HEALTH STABILITY: MENTAL HEALTH: NON-SPECIFIC ACTIVE SUICIDAL THOUGHTS (PAST 1 MONTH): NO

## 2025-01-25 SDOH — SOCIAL STABILITY: SOCIAL INSECURITY: VERBAL ABUSE: DENIES

## 2025-01-25 SDOH — HEALTH STABILITY: MENTAL HEALTH: HAVE YOU USED ANY PRESCRIPTION DRUGS OTHER THAN PRESCRIBED IN THE PAST 12 MONTHS?: NO

## 2025-01-25 SDOH — HEALTH STABILITY: MENTAL HEALTH: HAVE YOU USED ANY SUBSTANCES (CANABIS, COCAINE, HEROIN, HALLUCINOGENS, INHALANTS, ETC.) IN THE PAST 12 MONTHS?: NO

## 2025-01-25 SDOH — SOCIAL STABILITY: SOCIAL INSECURITY: DOES ANYONE TRY TO KEEP YOU FROM HAVING/CONTACTING OTHER FRIENDS OR DOING THINGS OUTSIDE YOUR HOME?: NO

## 2025-01-25 SDOH — SOCIAL STABILITY: SOCIAL INSECURITY: HAVE YOU HAD THOUGHTS OF HARMING ANYONE ELSE?: NO

## 2025-01-25 SDOH — SOCIAL STABILITY: SOCIAL INSECURITY: DO YOU FEEL ANYONE HAS EXPLOITED OR TAKEN ADVANTAGE OF YOU FINANCIALLY OR OF YOUR PERSONAL PROPERTY?: NO

## 2025-01-25 SDOH — SOCIAL STABILITY: SOCIAL INSECURITY: ABUSE SCREEN: ADULT

## 2025-01-25 SDOH — SOCIAL STABILITY: SOCIAL INSECURITY: ARE YOU OR HAVE YOU BEEN THREATENED OR ABUSED PHYSICALLY, EMOTIONALLY, OR SEXUALLY BY ANYONE?: NO

## 2025-01-25 SDOH — HEALTH STABILITY: MENTAL HEALTH: SUICIDAL BEHAVIOR (LIFETIME): NO

## 2025-01-25 ASSESSMENT — PATIENT HEALTH QUESTIONNAIRE - PHQ9
1. LITTLE INTEREST OR PLEASURE IN DOING THINGS: NOT AT ALL
SUM OF ALL RESPONSES TO PHQ9 QUESTIONS 1 & 2: 0
2. FEELING DOWN, DEPRESSED OR HOPELESS: NOT AT ALL

## 2025-01-25 ASSESSMENT — PAIN DESCRIPTION - PAIN TYPE: TYPE: ACUTE PAIN

## 2025-01-25 ASSESSMENT — COLUMBIA-SUICIDE SEVERITY RATING SCALE - C-SSRS
1. IN THE PAST MONTH, HAVE YOU WISHED YOU WERE DEAD OR WISHED YOU COULD GO TO SLEEP AND NOT WAKE UP?: NO
6. HAVE YOU EVER DONE ANYTHING, STARTED TO DO ANYTHING, OR PREPARED TO DO ANYTHING TO END YOUR LIFE?: NO
2. HAVE YOU ACTUALLY HAD ANY THOUGHTS OF KILLING YOURSELF?: NO

## 2025-01-25 ASSESSMENT — LIFESTYLE VARIABLES
SKIP TO QUESTIONS 9-10: 1
HOW OFTEN DO YOU HAVE 6 OR MORE DRINKS ON ONE OCCASION: NEVER
AUDIT-C TOTAL SCORE: 0
HOW MANY STANDARD DRINKS CONTAINING ALCOHOL DO YOU HAVE ON A TYPICAL DAY: PATIENT DOES NOT DRINK
AUDIT-C TOTAL SCORE: 0
HOW OFTEN DO YOU HAVE A DRINK CONTAINING ALCOHOL: NEVER

## 2025-01-25 ASSESSMENT — PAIN SCALES - GENERAL
PAINLEVEL_OUTOF10: 7
PAINLEVEL_OUTOF10: 7

## 2025-01-25 ASSESSMENT — PAIN - FUNCTIONAL ASSESSMENT: PAIN_FUNCTIONAL_ASSESSMENT: 0-10

## 2025-01-25 ASSESSMENT — PAIN DESCRIPTION - LOCATION: LOCATION: RIB CAGE
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
## 2025-01-27 LAB — BACTERIA UR CULT: NORMAL

## 2025-01-28 ENCOUNTER — APPOINTMENT (OUTPATIENT)
Dept: PHYSICAL THERAPY | Facility: CLINIC | Age: 25
End: 2025-01-28
Payer: COMMERCIAL

## 2025-01-30 ENCOUNTER — APPOINTMENT (OUTPATIENT)
Dept: OBSTETRICS AND GYNECOLOGY | Facility: CLINIC | Age: 25
End: 2025-01-30
Payer: COMMERCIAL

## 2025-01-30 ENCOUNTER — TELEPHONE (OUTPATIENT)
Dept: OBSTETRICS AND GYNECOLOGY | Facility: CLINIC | Age: 25
End: 2025-01-30

## 2025-01-30 DIAGNOSIS — O9A.219 TRAUMA DURING PREGNANCY (HHS-HCC): Primary | ICD-10-CM

## 2025-01-30 RX ORDER — CYCLOBENZAPRINE HCL 5 MG
5 TABLET ORAL 3 TIMES DAILY
Qty: 30 TABLET | Refills: 0 | Status: SHIPPED | OUTPATIENT
Start: 2025-01-30 | End: 2025-02-09
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
## 2025-01-31 ENCOUNTER — HOSPITAL ENCOUNTER (OUTPATIENT)
Dept: RADIOLOGY | Facility: CLINIC | Age: 25
Discharge: HOME | End: 2025-01-31
Payer: COMMERCIAL

## 2025-01-31 DIAGNOSIS — Z3A.08 8 WEEKS GESTATION OF PREGNANCY (HHS-HCC): ICD-10-CM

## 2025-01-31 PROCEDURE — 76816 OB US FOLLOW-UP PER FETUS: CPT

## 2025-02-07 ENCOUNTER — APPOINTMENT (OUTPATIENT)
Dept: OBSTETRICS AND GYNECOLOGY | Facility: CLINIC | Age: 25
End: 2025-02-07
Payer: COMMERCIAL

## 2025-02-21 ENCOUNTER — TREATMENT (OUTPATIENT)
Dept: PHYSICAL THERAPY | Facility: CLINIC | Age: 25
End: 2025-02-21
Payer: COMMERCIAL

## 2025-02-21 DIAGNOSIS — M62.89 MUSCLE TIGHTNESS: ICD-10-CM

## 2025-02-21 DIAGNOSIS — G89.29 CHRONIC BILATERAL LOW BACK PAIN WITHOUT SCIATICA: ICD-10-CM

## 2025-02-21 DIAGNOSIS — M62.89 PFD (PELVIC FLOOR DYSFUNCTION): Primary | ICD-10-CM

## 2025-02-21 DIAGNOSIS — M62.81 MUSCLE WEAKNESS: ICD-10-CM

## 2025-02-21 DIAGNOSIS — M54.50 CHRONIC BILATERAL LOW BACK PAIN WITHOUT SCIATICA: ICD-10-CM

## 2025-02-21 PROCEDURE — 97110 THERAPEUTIC EXERCISES: CPT | Mod: GP | Performed by: PHYSICAL THERAPIST

## 2025-02-21 PROCEDURE — 97535 SELF CARE MNGMENT TRAINING: CPT | Mod: GP | Performed by: PHYSICAL THERAPIST

## 2025-02-21 PROCEDURE — 97140 MANUAL THERAPY 1/> REGIONS: CPT | Mod: GP | Performed by: PHYSICAL THERAPIST
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
## 2025-03-03 ENCOUNTER — HOSPITAL ENCOUNTER (OUTPATIENT)
Dept: RADIOLOGY | Facility: CLINIC | Age: 25
Discharge: HOME | End: 2025-03-03
Payer: COMMERCIAL

## 2025-03-03 DIAGNOSIS — O36.5931 IUGR (INTRAUTERINE GROWTH RESTRICTION) AFFECTING CARE OF MOTHER, THIRD TRIMESTER, FETUS 1 (HHS-HCC): ICD-10-CM

## 2025-03-03 DIAGNOSIS — O20.0 THREATENED MISCARRIAGE (HHS-HCC): ICD-10-CM

## 2025-03-03 DIAGNOSIS — O10.919 CHRONIC HYPERTENSION AFFECTING PREGNANCY (HHS-HCC): ICD-10-CM

## 2025-03-03 PROCEDURE — 76819 FETAL BIOPHYS PROFIL W/O NST: CPT | Performed by: OBSTETRICS & GYNECOLOGY

## 2025-03-03 PROCEDURE — 76820 UMBILICAL ARTERY ECHO: CPT | Performed by: OBSTETRICS & GYNECOLOGY

## 2025-03-03 PROCEDURE — 76816 OB US FOLLOW-UP PER FETUS: CPT | Performed by: OBSTETRICS & GYNECOLOGY

## 2025-03-03 PROCEDURE — 76820 UMBILICAL ARTERY ECHO: CPT

## 2025-03-03 PROCEDURE — 76816 OB US FOLLOW-UP PER FETUS: CPT

## 2025-03-07 ENCOUNTER — ROUTINE PRENATAL (OUTPATIENT)
Dept: OBSTETRICS AND GYNECOLOGY | Facility: CLINIC | Age: 25
End: 2025-03-07
Payer: COMMERCIAL

## 2025-03-07 ENCOUNTER — APPOINTMENT (OUTPATIENT)
Dept: PHYSICAL THERAPY | Facility: CLINIC | Age: 25
End: 2025-03-07
Payer: COMMERCIAL

## 2025-03-07 VITALS — BODY MASS INDEX: 31.64 KG/M2 | DIASTOLIC BLOOD PRESSURE: 53 MMHG | WEIGHT: 162 LBS | SYSTOLIC BLOOD PRESSURE: 106 MMHG

## 2025-03-07 DIAGNOSIS — Z3A.28 28 WEEKS GESTATION OF PREGNANCY (HHS-HCC): ICD-10-CM

## 2025-03-07 DIAGNOSIS — Z23 NEED FOR TDAP VACCINATION: ICD-10-CM

## 2025-03-07 DIAGNOSIS — N76.0 BACTERIAL VAGINOSIS: ICD-10-CM

## 2025-03-07 DIAGNOSIS — O10.919 CHRONIC HYPERTENSION IN PREGNANCY (HHS-HCC): Primary | ICD-10-CM

## 2025-03-07 DIAGNOSIS — B96.89 BACTERIAL VAGINOSIS: ICD-10-CM

## 2025-03-07 DIAGNOSIS — O34.219 HISTORY OF CESAREAN DELIVERY, CURRENTLY PREGNANT (HHS-HCC): ICD-10-CM

## 2025-03-07 DIAGNOSIS — N89.8 VAGINAL DISCHARGE: ICD-10-CM

## 2025-03-07 PROCEDURE — 90715 TDAP VACCINE 7 YRS/> IM: CPT | Performed by: OBSTETRICS & GYNECOLOGY

## 2025-03-07 PROCEDURE — 99213 OFFICE O/P EST LOW 20 MIN: CPT | Mod: 25,TH | Performed by: OBSTETRICS & GYNECOLOGY

## 2025-03-07 PROCEDURE — 81003 URINALYSIS AUTO W/O SCOPE: CPT | Mod: QW | Performed by: OBSTETRICS & GYNECOLOGY

## 2025-03-07 PROCEDURE — 99213 OFFICE O/P EST LOW 20 MIN: CPT | Performed by: OBSTETRICS & GYNECOLOGY

## 2025-03-07 RX ORDER — B-COMPLEX WITH VITAMIN C
1 TABLET ORAL
COMMUNITY
Start: 2025-01-16

## 2025-03-07 RX ORDER — METRONIDAZOLE 500 MG/1
500 TABLET ORAL 2 TIMES DAILY
Qty: 14 TABLET | Refills: 0 | Status: SHIPPED | OUTPATIENT
Start: 2025-03-07 | End: 2025-03-14

## 2025-03-07 ASSESSMENT — EDINBURGH POSTNATAL DEPRESSION SCALE (EPDS)
I HAVE BEEN ANXIOUS OR WORRIED FOR NO GOOD REASON: NO, NOT AT ALL
I HAVE BEEN SO UNHAPPY THAT I HAVE BEEN CRYING: NO, NEVER
THE THOUGHT OF HARMING MYSELF HAS OCCURRED TO ME: NEVER
I HAVE FELT SAD OR MISERABLE: NO, NOT AT ALL
I HAVE FELT SCARED OR PANICKY FOR NO GOOD REASON: NO, NOT AT ALL
I HAVE BEEN ABLE TO LAUGH AND SEE THE FUNNY SIDE OF THINGS: AS MUCH AS I ALWAYS COULD
I HAVE BLAMED MYSELF UNNECESSARILY WHEN THINGS WENT WRONG: NO, NEVER
THINGS HAVE BEEN GETTING ON TOP OF ME: NO, I HAVE BEEN COPING AS WELL AS EVER
I HAVE LOOKED FORWARD WITH ENJOYMENT TO THINGS: AS MUCH AS I EVER DID
TOTAL SCORE: 0
I HAVE BEEN SO UNHAPPY THAT I HAVE HAD DIFFICULTY SLEEPING: NOT AT ALL

## 2025-03-07 ASSESSMENT — ENCOUNTER SYMPTOMS
VOMITING: 0
NAUSEA: 0
BACK PAIN: 1
HEMATURIA: 0
FEVER: 0
SORE THROAT: 0
DYSURIA: 0
DIARRHEA: 0
FLANK PAIN: 0
HEADACHES: 1
CHILLS: 0
ABDOMINAL PAIN: 0
ANOREXIA: 1
CONSTIPATION: 1
FREQUENCY: 1

## 2025-03-07 NOTE — PROGRESS NOTES
Ob Follow-up  25     SUBJECTIVE      HPI: Argelia Valentine is a 25 y.o.  at 28w6d here for RPNV.  She has -contractions, -bleeding, or -LOF. Reports ++normal fetal movement. Patient reports feels well       OBJECTIVE  Visit Vitals  /53   Wt 73.5 kg (162 lb)   LMP 2024   BMI 31.64 kg/m²   OB Status Pregnant   Smoking Status Some Days   BSA 1.76 m²            ASSESSMENT & PLAN    Argelia Valentine is a 25 y.o.  at 28w6d here for the following concerns we addressed today:    Problem List Items Addressed This Visit       28 weeks gestation of pregnancy ([facility])    Overview     Desired provider in labor: [] CNM  [] Physician  [x] Blood Products: [x] Yes, accepts [] No, needs counseling  [x] Initial BMI: Could not be calculated   [x] Prenatal Labs:   [x] Cervical Cancer Screening up to date  [x] Rh status: b+  [x] Genetic Screening:  Ordered today  [x] NT US: (11-13 wks)  [x] Baby ASA (if indicated):  [] Pregnancy dated by:     [x] Anatomy US: (19-20 wks) ordered  [] Federal Sterilization consent signed (if indicated):  [x] 1hr GCT at 24-28wks: Ordered  [] Rhogam (if indicated):   [] Fetal Surveillance (if indicated):  [x] Tdap (27-32 wks, may be given up to 36 wks if initial window missed):   [] RSV (32-36 wks) (Sept. to end of ):   [] Flu Vaccine: declines    [x] Breastfeeding: Breast and formula  [] Postpartum Birth control method: Undecided  [x] GBS at 36 - 37 wks:  [] 39 weeks discussion of IOL vs. Expectant management:  [] Mode of delivery ( anticipated ):           Current Assessment & Plan     Patient states not taking prenatal vitamin due to intolerance         Chronic hypertension in pregnancy ([facility]) - Primary    Overview     Not on meds  Begin          History of  delivery, currently pregnant ([facility])    Overview     History of  x 3  Will need repeat  delivery           Relevant Orders    CBC Anemia Panel With Reflex,Pregnancy    Glucose, 1 Hour  Screen, Pregnancy    Syphilis Screen with Reflex     Other Visit Diagnoses       Bacterial vaginosis        Relevant Medications    metroNIDAZOLE (Flagyl) 500 mg tablet    Vaginal discharge        Relevant Orders    C. trachomatis / N. gonorrhoeae, Amplified, Urogenital    Trichomonas vaginalis, Amplified              Orders Placed This Encounter   Procedures    CBC Anemia Panel With Reflex,Pregnancy     Standing Status:   Future     Number of Occurrences:   1     Standing Expiration Date:   3/7/2026     Order Specific Question:   Release result to MyChart     Answer:   Immediate [1]     Order Specific Question:   [facility] Carveout?     Answer:   CARVEOUT: SEND TO [facility]    Glucose, 1 Hour Screen, Pregnancy     Standing Status:   Future     Number of Occurrences:   1     Standing Expiration Date:   3/7/2026     Order Specific Question:   Release result to MyChart     Answer:   Immediate [1]    Syphilis Screen with Reflex     Standing Status:   Future     Number of Occurrences:   1     Standing Expiration Date:   3/7/2026     Order Specific Question:   Release result to MyChart     Answer:   Immediate [1]    C. trachomatis / N. gonorrhoeae, Amplified, Urogenital     Order Specific Question:   Release result to MyChart     Answer:   Immediate    Trichomonas vaginalis, Amplified     Order Specific Question:   Release result to MyChart     Answer:   Immediate   Tdap given today    RTC in 2 weeks      Eric Navarro MD

## 2025-03-08 LAB
C TRACH RRNA SPEC QL NAA+PROBE: NOT DETECTED
N GONORRHOEA RRNA SPEC QL NAA+PROBE: NOT DETECTED
QUEST GC CT AMPLIFIED (ALWAYS MESSAGE): NORMAL
T VAGINALIS RRNA SPEC QL NAA+PROBE: NOT DETECTED

## 2025-03-13 ENCOUNTER — HOSPITAL ENCOUNTER (OUTPATIENT)
Dept: CARDIOLOGY | Facility: HOSPITAL | Age: 25
Discharge: HOME | End: 2025-03-13
Payer: COMMERCIAL

## 2025-03-13 ENCOUNTER — HOSPITAL ENCOUNTER (EMERGENCY)
Facility: HOSPITAL | Age: 25
Discharge: HOME | End: 2025-03-13
Attending: EMERGENCY MEDICINE
Payer: COMMERCIAL

## 2025-03-13 VITALS
HEART RATE: 90 BPM | DIASTOLIC BLOOD PRESSURE: 74 MMHG | OXYGEN SATURATION: 100 % | SYSTOLIC BLOOD PRESSURE: 114 MMHG | RESPIRATION RATE: 18 BRPM | TEMPERATURE: 98.7 F

## 2025-03-13 DIAGNOSIS — E87.6 HYPOKALEMIA: ICD-10-CM

## 2025-03-13 DIAGNOSIS — R45.850 HOMICIDAL THOUGHTS: ICD-10-CM

## 2025-03-13 DIAGNOSIS — F32.A DEPRESSION, UNSPECIFIED DEPRESSION TYPE: Primary | ICD-10-CM

## 2025-03-13 LAB
ALBUMIN SERPL BCP-MCNC: 3.8 G/DL (ref 3.4–5)
ALP SERPL-CCNC: 99 U/L (ref 33–110)
ALT SERPL W P-5'-P-CCNC: 12 U/L (ref 7–45)
ANION GAP SERPL CALC-SCNC: 11 MMOL/L (ref 10–20)
AST SERPL W P-5'-P-CCNC: 18 U/L (ref 9–39)
BASOPHILS # BLD AUTO: 0.01 X10*3/UL (ref 0–0.1)
BASOPHILS NFR BLD AUTO: 0.1 %
BILIRUB SERPL-MCNC: 0.3 MG/DL (ref 0–1.2)
BUN SERPL-MCNC: 4 MG/DL (ref 6–23)
CALCIUM SERPL-MCNC: 8.8 MG/DL (ref 8.6–10.3)
CHLORIDE SERPL-SCNC: 104 MMOL/L (ref 98–107)
CO2 SERPL-SCNC: 23 MMOL/L (ref 21–32)
CREAT SERPL-MCNC: 0.56 MG/DL (ref 0.5–1.05)
EGFRCR SERPLBLD CKD-EPI 2021: >90 ML/MIN/1.73M*2
EOSINOPHIL # BLD AUTO: 0.02 X10*3/UL (ref 0–0.7)
EOSINOPHIL NFR BLD AUTO: 0.3 %
ERYTHROCYTE [DISTWIDTH] IN BLOOD BY AUTOMATED COUNT: 17.5 % (ref 11.5–14.5)
ETHANOL SERPL-MCNC: <10 MG/DL
GLUCOSE SERPL-MCNC: 85 MG/DL (ref 74–99)
HCT VFR BLD AUTO: 27.5 % (ref 36–46)
HGB BLD-MCNC: 8 G/DL (ref 12–16)
IMM GRANULOCYTES # BLD AUTO: 0.04 X10*3/UL (ref 0–0.7)
IMM GRANULOCYTES NFR BLD AUTO: 0.5 % (ref 0–0.9)
LYMPHOCYTES # BLD AUTO: 1.19 X10*3/UL (ref 1.2–4.8)
LYMPHOCYTES NFR BLD AUTO: 15.1 %
MCH RBC QN AUTO: 19 PG (ref 26–34)
MCHC RBC AUTO-ENTMCNC: 29.1 G/DL (ref 32–36)
MCV RBC AUTO: 65 FL (ref 80–100)
MONOCYTES # BLD AUTO: 0.4 X10*3/UL (ref 0.1–1)
MONOCYTES NFR BLD AUTO: 5.1 %
NEUTROPHILS # BLD AUTO: 6.22 X10*3/UL (ref 1.2–7.7)
NEUTROPHILS NFR BLD AUTO: 78.9 %
NRBC BLD-RTO: 0 /100 WBCS (ref 0–0)
PLATELET # BLD AUTO: 302 X10*3/UL (ref 150–450)
POTASSIUM SERPL-SCNC: 3 MMOL/L (ref 3.5–5.3)
PROT SERPL-MCNC: 7.7 G/DL (ref 6.4–8.2)
RBC # BLD AUTO: 4.21 X10*6/UL (ref 4–5.2)
SODIUM SERPL-SCNC: 135 MMOL/L (ref 136–145)
WBC # BLD AUTO: 7.9 X10*3/UL (ref 4.4–11.3)

## 2025-03-13 PROCEDURE — 93005 ELECTROCARDIOGRAM TRACING: CPT

## 2025-03-13 PROCEDURE — 36415 COLL VENOUS BLD VENIPUNCTURE: CPT | Performed by: EMERGENCY MEDICINE

## 2025-03-13 PROCEDURE — 2500000002 HC RX 250 W HCPCS SELF ADMINISTERED DRUGS (ALT 637 FOR MEDICARE OP, ALT 636 FOR OP/ED): Performed by: EMERGENCY MEDICINE

## 2025-03-13 PROCEDURE — 85025 COMPLETE CBC W/AUTO DIFF WBC: CPT | Performed by: EMERGENCY MEDICINE

## 2025-03-13 PROCEDURE — 82077 ASSAY SPEC XCP UR&BREATH IA: CPT | Performed by: EMERGENCY MEDICINE

## 2025-03-13 PROCEDURE — 99284 EMERGENCY DEPT VISIT MOD MDM: CPT | Performed by: EMERGENCY MEDICINE

## 2025-03-13 PROCEDURE — 80053 COMPREHEN METABOLIC PANEL: CPT | Performed by: EMERGENCY MEDICINE

## 2025-03-13 RX ORDER — POTASSIUM CHLORIDE 20 MEQ/1
40 TABLET, EXTENDED RELEASE ORAL ONCE
Status: COMPLETED | OUTPATIENT
Start: 2025-03-13 | End: 2025-03-13

## 2025-03-13 RX ADMIN — POTASSIUM CHLORIDE 40 MEQ: 1500 TABLET, EXTENDED RELEASE ORAL at 12:00

## 2025-03-13 SDOH — HEALTH STABILITY: MENTAL HEALTH: WISH TO BE DEAD (PAST 1 MONTH): NO

## 2025-03-13 SDOH — HEALTH STABILITY: MENTAL HEALTH: IN THE PAST FEW WEEKS, HAVE YOU FELT THAT YOU OR YOUR FAMILY WOULD BE BETTER OFF IF YOU WERE DEAD?: NO

## 2025-03-13 SDOH — HEALTH STABILITY: MENTAL HEALTH: HAVE YOU EVER TRIED TO KILL YOURSELF?: YES

## 2025-03-13 SDOH — HEALTH STABILITY: MENTAL HEALTH: SUICIDAL BEHAVIOR (LIFETIME): YES

## 2025-03-13 SDOH — HEALTH STABILITY: MENTAL HEALTH: ARE YOU HAVING THOUGHTS OF KILLING YOURSELF RIGHT NOW?: NO

## 2025-03-13 SDOH — HEALTH STABILITY: MENTAL HEALTH: DEPRESSION SYMPTOMS: APPETITE CHANGE;SLEEP DISTURBANCE

## 2025-03-13 SDOH — HEALTH STABILITY: MENTAL HEALTH: NON-SPECIFIC ACTIVE SUICIDAL THOUGHTS (PAST 1 MONTH): NO

## 2025-03-13 SDOH — HEALTH STABILITY: MENTAL HEALTH: SUICIDAL BEHAVIOR (3 MONTHS): NO

## 2025-03-13 SDOH — HEALTH STABILITY: MENTAL HEALTH: ANXIETY SYMPTOMS: GENERALIZED

## 2025-03-13 SDOH — HEALTH STABILITY: MENTAL HEALTH: IN THE PAST FEW WEEKS, HAVE YOU WISHED YOU WERE DEAD?: NO

## 2025-03-13 SDOH — ECONOMIC STABILITY: HOUSING INSECURITY: FEELS SAFE LIVING IN HOME: YES

## 2025-03-13 SDOH — HEALTH STABILITY: MENTAL HEALTH: IN THE PAST WEEK, HAVE YOU BEEN HAVING THOUGHTS ABOUT KILLING YOURSELF?: NO

## 2025-03-13 ASSESSMENT — PAIN SCALES - GENERAL: PAINLEVEL_OUTOF10: 0 - NO PAIN

## 2025-03-13 ASSESSMENT — PAIN - FUNCTIONAL ASSESSMENT: PAIN_FUNCTIONAL_ASSESSMENT: 0-10

## 2025-03-13 ASSESSMENT — LIFESTYLE VARIABLES
SUBSTANCE_ABUSE_PAST_12_MONTHS: NO
PRESCIPTION_ABUSE_PAST_12_MONTHS: NO

## 2025-03-13 NOTE — ED PROVIDER NOTES
HPI   Chief Complaint   Patient presents with    Homicidal       This is a 25-year-old female who presents to the emergency department for psychiatric evaluation.  The patient was at her psychiatrist's appointment when she reported she had thoughts of killing her boyfriend.  The patient also reports that she has been depressed and has not eaten in the last 2 to 3 days.  The patient states that she has no appetite.  She is 7 months pregnant but denies abdominal pain.  She reports the baby moving normally.    Past medical history: Depression.              Patient History   Past Medical History:   Diagnosis Date    COVID 2024    Encounter for contraceptive management, unspecified 2018    Contraception management    Encounter for initial prescription of contraceptive pills 2016    Encounter for initial prescription of contraceptive pills    Encounter for initial prescription of injectable contraceptive 10/12/2020    Encounter for initial prescription of injectable contraceptive    Encounter for other specified surgical aftercare 10/01/2020    Postoperative visit    Encounter for other specified surgical aftercare 10/01/2020    Postoperative visit    Encounter for pregnancy test, result negative 2021    Negative pregnancy test    Encounter for pregnancy test, result positive ([facility])     Positive pregnancy test    Encounter for removal of intrauterine contraceptive device 2017    Encounter for removal of intrauterine contraceptive device    Encounter for routine postpartum follow-up 2018    Postpartum exam    Encounter for routine postpartum follow-up 2017    Postpartum examination following  delivery    Encounter for screening for infections with a predominantly sexual mode of transmission 11/15/2022    Screen for STD (sexually transmitted disease)    Encounter for supervision of normal pregnancy, unspecified, second trimester 2017    Second trimester pregnancy     Encounter for supervision of normal pregnancy, unspecified, second trimester 2017    Second trimester pregnancy    Encounter for supervision of normal pregnancy, unspecified, third trimester 2018    Third trimester pregnancy    Fatigue 2024    Gonococcal infection of lower genitourinary tract with periurethral and accessory gland abscess 2021    Infection of lower genitourinary tract with periurethral gland abscess due to Neisseria gonorrhea    Low back pain, unspecified 06/15/2022    Maternal care for unspecified type scar from previous  delivery ([facility])     Previous  section complicating pregnancy    Nausea and vomiting 10/12/2022    Other acute postprocedural pain 2020    Postoperative pain    Other conditions influencing health status 2015    History of pregnancy    Personal history of other complications of pregnancy, childbirth and the puerperium 2018    History of postpartum depression    Personal history of other diseases of the female genital tract 2017    History of amenorrhea    Personal history of other infectious and parasitic diseases 2021    History of trichomonal vaginitis    Streptococcus, group b, as the cause of diseases classified elsewhere     Group beta Strep positive    Thumbnail injury 2024     Past Surgical History:   Procedure Laterality Date    OTHER SURGICAL HISTORY  10/27/2020    Dilation and curettage    OTHER SURGICAL HISTORY  10/27/2020     section     Family History   Problem Relation Name Age of Onset    No Known Problems Mother      COPD Father      Stroke Sister      Hypertension Sister      Heart disease Sister       Social History     Tobacco Use    Smoking status: Some Days     Types: Cigarettes, Cigars     Passive exposure: Never    Smokeless tobacco: Never    Tobacco comments:     Infrequent smoker.  Patient notes is not a problem   Vaping Use    Vaping status: Never Used   Substance Use  Topics    Alcohol use: Never    Drug use: Yes     Types: Marijuana       Physical Exam   ED Triage Vitals [03/13/25 1038]   Temperature Heart Rate Respirations BP   37.1 °C (98.7 °F) 90 18 114/74      Pulse Ox Temp Source Heart Rate Source Patient Position   100 % Oral -- Lying      BP Location FiO2 (%)     Right arm --       Physical Exam  Vitals and nursing note reviewed.   HENT:      Head: Normocephalic and atraumatic.      Nose: Nose normal.   Eyes:      Conjunctiva/sclera: Conjunctivae normal.   Cardiovascular:      Rate and Rhythm: Normal rate and regular rhythm.      Pulses: Normal pulses.      Heart sounds: Normal heart sounds.   Pulmonary:      Effort: Pulmonary effort is normal.      Breath sounds: Normal breath sounds.   Abdominal:      General: Bowel sounds are normal.      Palpations: Abdomen is soft.   Musculoskeletal:         General: Normal range of motion.      Cervical back: Normal range of motion and neck supple.   Skin:     Findings: No rash.   Neurological:      General: No focal deficit present.      Mental Status: She is alert and oriented to person, place, and time.   Psychiatric:         Mood and Affect: Mood normal.           ED Course & MDM   Diagnoses as of 03/13/25 1355   Depression, unspecified depression type   Homicidal thoughts   Hypokalemia                 No data recorded                                 Medical Decision Making  Differential diagnosis considered: Medical clearance, electrolyte abnormality, dehydration, alcohol and drug use    This is a 25-year-old female who presents to the emergency department for psychiatric evaluation.  The patient medical clearance consisted of labs.  Her potassium was low at 3.0.  She reports she has been told this previously.  She was given potassium replacement.  She was counseled on increasing potassium in her diet.  The patient was evaluated by EPAT and felt cleared for discharge home.  She will continue her outpatient psychiatric  treatment.    Amount and/or Complexity of Data Reviewed  ECG/medicine tests: independent interpretation performed.     Details: Normal sinus rhythm, heart rate 94, incomplete RBBB, no ST elevation, no change compared to 2/29/2024.        Procedure  Procedures     Juan M Liang MD  03/13/25 4759

## 2025-03-13 NOTE — ED TRIAGE NOTES
Pt to ed from [facility]. She was being seen by psychiatrists when she told them she wants to kill the father of her child.

## 2025-03-13 NOTE — PROGRESS NOTES
EPAT - Social Work Psychiatric Assessment    Arrival Details  Mode of Arrival: Ambulatory  Admission Source: Home  Admission Type: Involuntary  EPAT Assessment Start Date: 03/13/25  EPAT Assessment Start Time: 1115  Name of : Cameron Ramírez    History of Present Illness  Admission Reason: homicidal thoughts  HPI: Patient is a 25 year old 7 month pregnant female with a history of Bi-Polar and Anxiety. Today she was seeing her Counselor and made a statement that she wanted to kill her boyfriend. The counselor spoke with the patient's psychiatrist and she was sent for an evaluation. She denied any suicidal thoughts or hallucinations. A review of her Triage, Provider and [place] was conducted.    SW Readmission Information   Readmission within 30 Days: No    Psychiatric Symptoms  Anxiety Symptoms: Generalized  Depression Symptoms: Appetite change, Sleep disturbance  Kim Symptoms: No problems reported or observed.    Psychosis Symptoms  Hallucination Type: No problems reported or observed.  Delusion Type: No problems reported or observed.    Additional Symptoms - Adult  Generalized Anxiety Disorder: Excessive anxiety/worry  Obsessive Compulsive Disorder: No problems reported or observed.  Panic Attack: No problems reported or observed.  Post Traumatic Stress Disorder: No problems reported or observed.  Delirium: No problems reported or observed.  Review of Symptoms Comments: Please see above    Past Psychiatric History/Meds/Treatments  Past Psychiatric History: Patient has a history of Bi-Polar, Depression and Anxiety. She sees all her mental health providers at [facility]. She takes her medications as prescribed. She has one prior suicide attempt as a teenager. No history of subtance treatment  Past Psychiatric Meds/Treatments: see med list. Patient is compliant  Past Violence/Victimization History: none    Current Mental Health Contacts   Name/Phone Number: [facility]  Case  Manager Last Appointment Date: sees counselor three times a week in person  Provider Name/Phone Number: [facility][1]  Provider Last Appointment Date: See Psychiatrist monthly. Last appointment was yesterday 3/12/25    Support System: Community ([facility][1])    Living Arrangement: Apartment    Home Safety  Feels Safe Living in Home: Yes         [place] Service/Education History  Current or Previous  Service: None  Education Level: High school  History of Learning Problems: No  History of School Behavior Problems: No  School History: See above    Social/Cultural History  Social History: Patient is her own guardian.  Important Activities: Family    Legal  Legal Concerns: none    Drug Screening  Have you used any substances (canabis, cocaine, heroin, hallucinogens, inhalants, etc.) in the past 12 months?: No  Have you used any prescription drugs other than prescribed in the past 12 months?: No  Is a toxicology screen needed?: No         Behavioral Health  Behavioral Health(WDL): Exceptions to WDL  Behaviors/Mood: Anxious, Cooperative  Affect: Appropriate to circumstances  Parent/Guardian/Significant Other Involvement: No involvement    Orientation  Orientation Level: Oriented X4    General Appearance  Motor Activity: Unremarkable  Speech Pattern: Other (Comment)  General Attitude: Cooperative  Appearance/Hygiene: Unremarkable    Thought Process  Coherency: Other (Comment)  Content: Unremarkable  Delusions: Other (Comment)  Perception: Not altered  Hallucination: None  Judgment/Insight:  (fair)  Confusion: None  Cognition: Follows commands    Sleep Pattern  Sleep Pattern: Difficulty falling asleep    Risk Factors  Self Harm/Suicidal Ideation Plan: Patient denies  Previous Self Harm/Suicidal Plans: one past attempt as a teenager.  Risk Factors: Unemployment, Age < 19 years old, Lower socioeconomic status    Violence Risk Assessment  Assessment of Violence: None noted  Thoughts of Harm to  Others: No    Ability to Assess Risk Screen  Risk Screen - Ability to Assess: Able to be screened  Ask Suicide-Screening Questions  1. In the past few weeks, have you wished you were dead?: No  2. In the past few weeks, have you felt that you or your family would be better off if you were dead?: No  3. In the past week, have you been having thoughts about killing yourself?: No  4. Have you ever tried to kill yourself?: Yes  5. Are you having thoughts of killing yourself right now?: No  Calculated Risk Score: Potential Risk  [place] Suicide Severity Rating Scale (Screener/Recent Self-Report)  1. Wish to be Dead (Past 1 Month): No  2. Non-Specific Active Suicidal Thoughts (Past 1 Month): No  6. Suicidal Behavior (Lifetime): Yes  6. Suicidal Behavior (3 Months): No  Calculated C-SSRS Risk Score (Lifetime/Recent): Moderate Risk  Step 1: Risk Factors  Current & Past Psychiatric Dx: Mood disorder  Presenting Symptoms: Anxiety and/or panic  Family History: Other (Comment)  Precipitants/Stressors: Triggering events leading to humiliation, shame, and/or despair (e.g. loss of relationship, financial or health status) (real or anticipated)  Change in Treatment: Change in provider or treament (i.e., medications, psychotherapy, milieu)  Access to Lethal Methods : No  Step 2: Protective Factors   Protective Factors Internal: Ability to cope with stress, Identifies reasons for living  Protective Factors External: Cultural, spiritual and/or moral attitudes against suicide, Positive therapeutic relationships  Step 3: Suicidal Ideation Intensity  How Many Times Have You Had These Thoughts: Less than once a week  When You Have the Thoughts How Long do They Last : Fleeting - few seconds or minutes  Could/Can You Stop Thinking About Killing Yourself or Wanting to Die if You Want to: Easily able to control thoughts  Are There Things - Anyone or Anything - That Stopped You From Wanting to Die or Acting on: Deterrents definitely stopped  "you from attempting suicide  What Sort of Reasons Did You Have For Thinking About Wanting to Die or Killing Yourself: Completely to get attention, revenge, or a reaction from others  Total Score: 5  Step 5: Documentation  Risk Level: Moderate suicide risk (Patient is moderate risk. DR. Liang agrees.)    Psychiatric Impression and Plan of Care  Assessment and Plan: Patient is a 25 year old female who presents to the ED by her Psychiatrist. She was at her provider office and stated she wanted to kill her boyfriend. The patient currently denies this and stated she was \"misunderstood\". She did share she often argues with her boyfriend and \"wish he would leave me alone\". The denies any thoughts or harm to him or anyone else. She denies any thoughts to harm herself. She admits to a past suicide attempt as a teenager where she was hospitalized. She does report limited sleep and poor appetite. 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 She is future oriented. She sees her Counselor at [facility] 3x per week and her Psychatrist there 1x per month. She takes her medications as prescribed. She denies any hallucinations and does not appear internally stimulated.  Specific Resources Provided to Patient: [facility] Notified: yes  PHP/IOP Recommended: none  Specific Information Provided for PHP/IOP: n/a  Plan Comments: discharge home    Outcome/Disposition  Patient's Perception of Outcome Achieved: patient is future oriented.  Assessment, Recommendations and Risk Level Reviewed with: discharge home  Contact Name: Daniela José  Contact Number(s): [phone]  Contact Relationship: relative  EPAT Assessment Completed Date: 03/13/25  EPAT Assessment Completed Time: 1154  Patient Disposition: Home    Social Work Note  "

## 2025-03-14 ENCOUNTER — HOSPITAL ENCOUNTER (OUTPATIENT)
Dept: RADIOLOGY | Facility: CLINIC | Age: 25
Discharge: HOME | End: 2025-03-14
Payer: COMMERCIAL

## 2025-03-14 DIAGNOSIS — O20.0 THREATENED MISCARRIAGE (HHS-HCC): ICD-10-CM

## 2025-03-14 DIAGNOSIS — O36.5990 MATERNAL CARE FOR OTHER KNOWN OR SUSPECTED POOR FETAL GROWTH, UNSPECIFIED TRIMESTER, NOT APPLICABLE OR UNSPECIFIED: ICD-10-CM

## 2025-03-14 PROCEDURE — 76815 OB US LIMITED FETUS(S): CPT

## 2025-03-14 PROCEDURE — 76819 FETAL BIOPHYS PROFIL W/O NST: CPT

## 2025-03-17 ENCOUNTER — TELEPHONE (OUTPATIENT)
Dept: OBSTETRICS AND GYNECOLOGY | Facility: CLINIC | Age: 25
End: 2025-03-17

## 2025-03-17 ENCOUNTER — ROUTINE PRENATAL (OUTPATIENT)
Dept: OBSTETRICS AND GYNECOLOGY | Facility: CLINIC | Age: 25
End: 2025-03-17
Payer: COMMERCIAL

## 2025-03-17 ENCOUNTER — LAB (OUTPATIENT)
Dept: LAB | Facility: HOSPITAL | Age: 25
End: 2025-03-17
Payer: COMMERCIAL

## 2025-03-17 VITALS — BODY MASS INDEX: 30.66 KG/M2 | WEIGHT: 157 LBS | DIASTOLIC BLOOD PRESSURE: 57 MMHG | SYSTOLIC BLOOD PRESSURE: 109 MMHG

## 2025-03-17 DIAGNOSIS — Z3A.30 30 WEEKS GESTATION OF PREGNANCY (HHS-HCC): Primary | ICD-10-CM

## 2025-03-17 DIAGNOSIS — O10.919 CHRONIC HYPERTENSION IN PREGNANCY (HHS-HCC): ICD-10-CM

## 2025-03-17 DIAGNOSIS — O36.5930: ICD-10-CM

## 2025-03-17 LAB
ERYTHROCYTE [DISTWIDTH] IN BLOOD BY AUTOMATED COUNT: 17.7 % (ref 11.5–14.5)
FERRITIN SERPL-MCNC: 8 NG/ML
HCT VFR BLD AUTO: 26.7 % (ref 36–46)
HGB BLD-MCNC: 7.7 G/DL (ref 12–16)
IRON SATN MFR SERPL: NORMAL %
IRON SERPL-MCNC: 19 UG/DL
MCH RBC QN AUTO: 18.8 PG (ref 26–34)
MCHC RBC AUTO-ENTMCNC: 28.8 G/DL (ref 32–36)
MCV RBC AUTO: 65 FL (ref 80–100)
NRBC BLD-RTO: 0 /100 WBCS (ref 0–0)
PLATELET # BLD AUTO: 309 X10*3/UL (ref 150–450)
POC BLOOD, URINE: NEGATIVE
POC GLUCOSE, URINE: NEGATIVE MG/DL
POC KETONES, URINE: NEGATIVE MG/DL
POC LEUKOCYTES, URINE: NEGATIVE
POC NITRITE,URINE: NEGATIVE
POC PROTEIN, URINE: NEGATIVE MG/DL
RBC # BLD AUTO: 4.1 X10*6/UL (ref 4–5.2)
REFLEX ADDED, ANEMIA PANEL: NORMAL
TIBC SERPL-MCNC: NORMAL UG/DL
UIBC SERPL-MCNC: >450 UG/DL
WBC # BLD AUTO: 6.2 X10*3/UL (ref 4.4–11.3)

## 2025-03-17 PROCEDURE — 99213 OFFICE O/P EST LOW 20 MIN: CPT | Mod: TH | Performed by: OBSTETRICS & GYNECOLOGY

## 2025-03-17 PROCEDURE — 83550 IRON BINDING TEST: CPT

## 2025-03-17 PROCEDURE — 85027 COMPLETE CBC AUTOMATED: CPT

## 2025-03-17 PROCEDURE — 81003 URINALYSIS AUTO W/O SCOPE: CPT | Mod: QW | Performed by: OBSTETRICS & GYNECOLOGY

## 2025-03-17 PROCEDURE — 82728 ASSAY OF FERRITIN: CPT

## 2025-03-17 PROCEDURE — 99213 OFFICE O/P EST LOW 20 MIN: CPT | Performed by: OBSTETRICS & GYNECOLOGY

## 2025-03-17 NOTE — PROGRESS NOTES
Ob Follow-up  25     SUBJECTIVE      HPI: Argelia Valentine is a 25 y.o.  at 30w2d here for RPNV.  She has -contractions, -bleeding, or -LOF. Reports ++normal fetal movement. Patient reports restarting her [place]uda       OBJECTIVE  Visit Vitals  /57   Wt 71.2 kg (157 lb)   LMP 2024   BMI 30.66 kg/m²   OB Status Pregnant   Smoking Status Some Days   BSA 1.74 m²            ASSESSMENT & PLAN    Argelia Valentine is a 25 y.o.  at 30w2d here for the following concerns we addressed today:    Problem List Items Addressed This Visit       30 weeks gestation of pregnancy ([facility])    Overview     Desired provider in labor: [] CNM  [] Physician  [x] Blood Products: [x] Yes, accepts [] No, needs counseling  [x] Initial BMI: Could not be calculated   [x] Prenatal Labs:   [x] Cervical Cancer Screening up to date  [x] Rh status: b+  [x] Genetic Screening:  Ordered today  [x] NT US: (11-13 wks)  [x] Baby ASA (if indicated):  [] Pregnancy dated by:     [x] Anatomy US: (19-20 wks) ordered  [] Federal Sterilization consent signed (if indicated):  [x] 1hr GCT at 24-28wks: Ordered.  Encouraged to go today  [] Rhogam (if indicated):   [] Fetal Surveillance (if indicated):  [x] Tdap (27-32 wks, may be given up to 36 wks if initial window missed):   [] RSV (32-36 wks) (Sept. to end of ):   [] Flu Vaccine: declines    [x] Breastfeeding: Breast and formula  [] Postpartum Birth control method: Undecided  [x] GBS at 36 - 37 wks:  [] 39 weeks discussion of IOL vs. Expectant management:  [] Mode of delivery ( anticipated ):           Chronic hypertension in pregnancy ([facility]) - Primary    Overview     Not on meds  Begin          RESOLVED: Poor fetal growth in pregnancy, third trimester ([facility])    Current Assessment & Plan     Begin nonstress test at 32 weeks  Biophysical profile weekly              No orders of the defined types were placed in this encounter.       RTC in 2 weeks      Eric Navarro MD

## 2025-03-18 LAB — T VAGINALIS RRNA SPEC QL NAA+PROBE: NOT DETECTED

## 2025-03-20 ENCOUNTER — HOSPITAL ENCOUNTER (OUTPATIENT)
Facility: HOSPITAL | Age: 25
Discharge: HOME | End: 2025-03-20
Attending: OBSTETRICS & GYNECOLOGY | Admitting: OBSTETRICS & GYNECOLOGY
Payer: COMMERCIAL

## 2025-03-20 ENCOUNTER — HOSPITAL ENCOUNTER (OUTPATIENT)
Facility: HOSPITAL | Age: 25
End: 2025-03-20
Attending: OBSTETRICS & GYNECOLOGY | Admitting: OBSTETRICS & GYNECOLOGY
Payer: COMMERCIAL

## 2025-03-20 VITALS
DIASTOLIC BLOOD PRESSURE: 52 MMHG | SYSTOLIC BLOOD PRESSURE: 99 MMHG | HEART RATE: 82 BPM | OXYGEN SATURATION: 100 % | TEMPERATURE: 98.8 F | RESPIRATION RATE: 18 BRPM | BODY MASS INDEX: 30.66 KG/M2 | HEIGHT: 60 IN

## 2025-03-20 LAB
ABO GROUP (TYPE) IN BLOOD: NORMAL
ANTIBODY SCREEN: NORMAL
ERYTHROCYTE [DISTWIDTH] IN BLOOD BY AUTOMATED COUNT: 17.5 % (ref 11.5–14.5)
GLUCOSE 1H P 50 G GLC PO SERPL-MCNC: 123 MG/DL
HCT VFR BLD AUTO: 26.1 % (ref 36–46)
HGB BLD-MCNC: 7.5 G/DL (ref 12–16)
MCH RBC QN AUTO: 18.6 PG (ref 26–34)
MCHC RBC AUTO-ENTMCNC: 28.7 G/DL (ref 32–36)
MCV RBC AUTO: 65 FL (ref 80–100)
NRBC BLD-RTO: 0 /100 WBCS (ref 0–0)
PLATELET # BLD AUTO: 264 X10*3/UL (ref 150–450)
RBC # BLD AUTO: 4.03 X10*6/UL (ref 4–5.2)
RH FACTOR (ANTIGEN D): NORMAL
T PALLIDUM AB SER QL IA: NEGATIVE
WBC # BLD AUTO: 7.5 X10*3/UL (ref 4.4–11.3)

## 2025-03-20 PROCEDURE — 59025 FETAL NON-STRESS TEST: CPT | Performed by: ADVANCED PRACTICE MIDWIFE

## 2025-03-20 PROCEDURE — 36415 COLL VENOUS BLD VENIPUNCTURE: CPT | Performed by: ADVANCED PRACTICE MIDWIFE

## 2025-03-20 PROCEDURE — 85027 COMPLETE CBC AUTOMATED: CPT | Performed by: ADVANCED PRACTICE MIDWIFE

## 2025-03-20 PROCEDURE — 99214 OFFICE O/P EST MOD 30 MIN: CPT | Performed by: ADVANCED PRACTICE MIDWIFE

## 2025-03-20 PROCEDURE — 2500000004 HC RX 250 GENERAL PHARMACY W/ HCPCS (ALT 636 FOR OP/ED): Performed by: ADVANCED PRACTICE MIDWIFE

## 2025-03-20 PROCEDURE — 36415 COLL VENOUS BLD VENIPUNCTURE: CPT

## 2025-03-20 PROCEDURE — 86780 TREPONEMA PALLIDUM: CPT | Mod: AHULAB | Performed by: ADVANCED PRACTICE MIDWIFE

## 2025-03-20 PROCEDURE — 99214 OFFICE O/P EST MOD 30 MIN: CPT | Mod: 25

## 2025-03-20 PROCEDURE — 86850 RBC ANTIBODY SCREEN: CPT | Performed by: ADVANCED PRACTICE MIDWIFE

## 2025-03-20 RX ORDER — DIPHENHYDRAMINE HYDROCHLORIDE 50 MG/ML
50 INJECTION, SOLUTION INTRAMUSCULAR; INTRAVENOUS EVERY 5 MIN PRN
Status: DISCONTINUED | OUTPATIENT
Start: 2025-03-20 | End: 2025-03-20 | Stop reason: HOSPADM

## 2025-03-20 RX ORDER — LIDOCAINE HYDROCHLORIDE 10 MG/ML
0.5 INJECTION, SOLUTION EPIDURAL; INFILTRATION; INTRACAUDAL; PERINEURAL ONCE AS NEEDED
Status: DISCONTINUED | OUTPATIENT
Start: 2025-03-20 | End: 2025-03-20 | Stop reason: HOSPADM

## 2025-03-20 RX ORDER — ONDANSETRON HYDROCHLORIDE 2 MG/ML
4 INJECTION, SOLUTION INTRAVENOUS EVERY 6 HOURS PRN
Status: DISCONTINUED | OUTPATIENT
Start: 2025-03-20 | End: 2025-03-20 | Stop reason: HOSPADM

## 2025-03-20 RX ORDER — ONDANSETRON 4 MG/1
4 TABLET, FILM COATED ORAL EVERY 6 HOURS PRN
Status: DISCONTINUED | OUTPATIENT
Start: 2025-03-20 | End: 2025-03-20 | Stop reason: HOSPADM

## 2025-03-20 RX ORDER — LABETALOL HYDROCHLORIDE 5 MG/ML
20 INJECTION, SOLUTION INTRAVENOUS ONCE AS NEEDED
Status: DISCONTINUED | OUTPATIENT
Start: 2025-03-20 | End: 2025-03-20 | Stop reason: HOSPADM

## 2025-03-20 RX ORDER — HYDRALAZINE HYDROCHLORIDE 20 MG/ML
5 INJECTION INTRAMUSCULAR; INTRAVENOUS ONCE AS NEEDED
Status: DISCONTINUED | OUTPATIENT
Start: 2025-03-20 | End: 2025-03-20 | Stop reason: HOSPADM

## 2025-03-20 RX ADMIN — IRON SUCROSE 300 MG: 20 INJECTION, SOLUTION INTRAVENOUS at 15:16

## 2025-03-20 ASSESSMENT — PAIN SCALES - GENERAL: PAINLEVEL_OUTOF10: 9

## 2025-03-20 NOTE — H&P
OB Triage H&P    Assessment/Plan    Argelia Valentine is a 25 y.o.  at 30w5d, RAHEL: 2025, by Last Menstrual Period, who presents to triage with pressure, pain and spotting.  --not apparently in PTL and pt now sleeping  --anemia--pt accepts a dose of venofer now  --message to Dr. Navarro with status. Per pt she and he have discussed fe infusions    Plan    -Fetal monitoring reassuring  -Good fetal movement  -Up to date on prenatal care  -Continue routine prenatal care    Dispo  -Patient appropriate for discharge home, agrees with plan  -Return precautions discussed   -Follow up at next scheduled OB appointment or to triage sooner as needed    Dr. Duggan aware of status and plan    Pregnancy Problems (from 24 to present)       Problem Noted Diagnosed Resolved    Chronic hypertension in pregnancy ([facility]) 10/8/2024 by Delon Long MD  No    Priority:  Medium       Overview Addendum 3/17/2025 10:10 AM by Eric Navarro MD     Not on meds  Begin   Stable blood pressure no treatment necessary         30 weeks gestation of pregnancy ([facility]) 10/8/2024 by Delon Long MD  No    Priority:  Medium       Overview Addendum 3/17/2025 10:09 AM by Eric Navarro MD     Desired provider in labor: [] CNM  [] Physician  [x] Blood Products: [x] Yes, accepts [] No, needs counseling  [x] Initial BMI: Could not be calculated   [x] Prenatal Labs:   [x] Cervical Cancer Screening up to date  [x] Rh status: b+  [x] Genetic Screening:  Ordered today  [x] NT US: (11-13 wks)  [x] Baby ASA (if indicated):  [] Pregnancy dated by:     [x] Anatomy US: (19-20 wks) ordered  [] Federal Sterilization consent signed (if indicated):  [x] 1hr GCT at 24-28wks: Ordered.  Encouraged to go today  [] Rhogam (if indicated):   [] Fetal Surveillance (if indicated):  [x] Tdap (27-32 wks, may be given up to 36 wks if initial window missed):   [] RSV (32-36 wks) (Sept. to end of ):   [] Flu Vaccine: declines    [x]  Breastfeeding: Breast and formula  [] Postpartum Birth control method: Undecided  [x] GBS at 36 - 37 wks:  [] 39 weeks discussion of IOL vs. Expectant management:  [] Mode of delivery ( anticipated ):           Anembryonic pregnancy ([facility]) 2024 by Eric Navarro MD  No    Priority:  Medium       Overview Signed 2024 10:01 AM by Eric Navarro MD     No fetal pole on 9 week preg  Repeat U/S  Follow up 1 week         Hyperemesis gravidarum ([facility]) 2024 by Eric Navarro MD  No    Priority:  Medium       Overview Signed 2024 10:02 AM by Eric Navarro MD     Unison and B6  Pressure sea bands         Asthma affecting pregnancy in first trimester ([facility]) 2023 by Calixto Pizano MA  No    Priority:  Medium       Overview Signed 10/8/2024  1:21 PM by Delon Long MD     On albuterol         Threatened miscarriage ([facility]) 2024 by Eric Navarro MD  10/8/2024 by Delon Long MD            Subjective   26yo  presents at 30.5 reporting bleeding with intercourse yesterday, spotting this morning and pressure/contractions since last night. No current gorge bleeding. No leaking. Baby is active. Pregnancy notable for  --history c/s x3  --history pec in first pregnancy with medically indicated  birth  --significant anemia (hgb 7.5)    Prenatal Provider pati    OB History    Para Term  AB Living   6 3 2 1 2 3   SAB IAB Ectopic Multiple Live Births   1 1 0 0 3      # Outcome Date GA Lbr Jonn/2nd Weight Sex Type Anes PTL Lv   6 Current            5 2022     Complete      4 Term 2018    M CS-LTranv      3 Term 2016    M CS-LTranv      2  12/23/15 32w0d   F CS-LTranv   BETZY      Complications: Severe pre-eclampsia   1 IAB 12     D&E Gen         Past Surgical History:   Procedure Laterality Date    OTHER SURGICAL HISTORY  10/27/2020    Dilation and curettage    OTHER SURGICAL HISTORY  10/27/2020     section       Social History      Tobacco Use    Smoking status: Some Days     Types: Cigarettes, Cigars     Passive exposure: Never    Smokeless tobacco: Never    Tobacco comments:     Infrequent smoker.  Patient notes is not a problem   Substance Use Topics    Alcohol use: Never       Allergies   Allergen Reactions    Sumatriptan Anaphylaxis       Medications Prior to Admission   Medication Sig Dispense Refill Last Dose/Taking    albuterol 90 mcg/actuation inhaler Inhale 2 puffs every 4 hours if needed for wheezing. 18 g 0 Past Week    aspirin 81 mg chewable tablet Chew 2 tablets (162 mg) once daily. 60 tablet 11 Past Week    lurasidone (Latuda) 20 mg tablet    Past Month    doxylamine (Unisom) 25 mg tablet Take 1 tablet (25 mg) by mouth once daily at bedtime. 60 tablet 0     metoclopramide (Reglan) 10 mg tablet Take 1 tablet (10 mg) by mouth 4 times a day for 10 days. 40 tablet 0     [] metroNIDAZOLE (Flagyl) 500 mg tablet Take 1 tablet (500 mg) by mouth 2 times a day for 7 days. 14 tablet 0     Prenatal Vitamin 27 mg iron- 0.8 mg tablet Take 1 tablet by mouth early in the morning.. (Patient not taking: Reported on 3/20/2025)   More than a month    prenatal vitamin calcium-iron-folic 27 mg iron- 1 mg tablet Take 1 tablet by mouth once daily. (Patient not taking: Reported on 3/20/2025) 90 tablet 3 More than a month     Objective     Last Vitals  Temp Pulse Resp BP MAP O2 Sat   37.1 °C (98.8 °F) 82 18 99/52 70 100 %     Blood Pressures         3/20/2025  1015 3/20/2025  1414          BP: 94/53 99/52               Physical Exam  General: NAD, mood appropriate  Cardiopulmonary: warm and well perfused, breathing comfortably on room air  Abdomen: Gravid, non-tender  Extremities: Symmetric  Speculum Exam: no blood noted in vault  Cervix: Closed /  /      Chaperone Present: yes  Chaperone Name/Title: RN  Examination Chaperoned: cervical and speculum check     Fetal Monitoring  Baseline: 125 bpm, Variability: moderate,  Accelerations: present  and Decelerations: none  Rare contractions  Interpretation: Reactive    Bedside ultrasound: No      Results from last 7 days   Lab Units 03/20/25  1107 03/17/25  1210   WBC AUTO x10*3/uL 7.5 6.2   HEMOGLOBIN g/dL 7.5* 7.7*   HEMATOCRIT % 26.1* 26.7*   PLATELETS AUTO x10*3/uL 264 309        Prenatal labs reviewed, remarkable for severe anemia.

## 2025-03-21 ENCOUNTER — HOSPITAL ENCOUNTER (OUTPATIENT)
Dept: RADIOLOGY | Facility: CLINIC | Age: 25
Discharge: HOME | End: 2025-03-21
Payer: COMMERCIAL

## 2025-03-21 ENCOUNTER — TREATMENT (OUTPATIENT)
Dept: PHYSICAL THERAPY | Facility: CLINIC | Age: 25
End: 2025-03-21
Payer: COMMERCIAL

## 2025-03-21 DIAGNOSIS — M62.89 PFD (PELVIC FLOOR DYSFUNCTION): ICD-10-CM

## 2025-03-21 DIAGNOSIS — O36.5990 FETAL GROWTH RESTRICTION ANTEPARTUM (HHS-HCC): ICD-10-CM

## 2025-03-21 DIAGNOSIS — M54.50 CHRONIC BILATERAL LOW BACK PAIN WITHOUT SCIATICA: ICD-10-CM

## 2025-03-21 DIAGNOSIS — O20.0 THREATENED MISCARRIAGE (HHS-HCC): ICD-10-CM

## 2025-03-21 DIAGNOSIS — M62.89 MUSCLE TIGHTNESS: ICD-10-CM

## 2025-03-21 DIAGNOSIS — O10.919 CHRONIC HYPERTENSION IN PREGNANCY (HHS-HCC): ICD-10-CM

## 2025-03-21 DIAGNOSIS — G89.29 CHRONIC BILATERAL LOW BACK PAIN WITHOUT SCIATICA: ICD-10-CM

## 2025-03-21 DIAGNOSIS — M62.81 MUSCLE WEAKNESS: ICD-10-CM

## 2025-03-21 LAB — TREPONEMA PALLIDUM IGG+IGM AB [PRESENCE] IN SERUM OR PLASMA BY IMMUNOASSAY: NONREACTIVE

## 2025-03-21 PROCEDURE — 97140 MANUAL THERAPY 1/> REGIONS: CPT | Mod: GP | Performed by: PHYSICAL THERAPIST

## 2025-03-21 PROCEDURE — 76815 OB US LIMITED FETUS(S): CPT

## 2025-03-21 PROCEDURE — 76819 FETAL BIOPHYS PROFIL W/O NST: CPT
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
## 2025-03-24 ENCOUNTER — PROCEDURE VISIT (OUTPATIENT)
Dept: OBSTETRICS AND GYNECOLOGY | Facility: CLINIC | Age: 25
End: 2025-03-24
Payer: COMMERCIAL

## 2025-03-24 ENCOUNTER — DOCUMENTATION (OUTPATIENT)
Dept: INFUSION THERAPY | Facility: CLINIC | Age: 25
End: 2025-03-24
Payer: COMMERCIAL

## 2025-03-24 VITALS — BODY MASS INDEX: 31.25 KG/M2 | SYSTOLIC BLOOD PRESSURE: 109 MMHG | WEIGHT: 160 LBS | DIASTOLIC BLOOD PRESSURE: 59 MMHG

## 2025-03-24 DIAGNOSIS — Z3A.31 31 WEEKS GESTATION OF PREGNANCY (HHS-HCC): Primary | ICD-10-CM

## 2025-03-24 DIAGNOSIS — O34.219 HISTORY OF CESAREAN DELIVERY, CURRENTLY PREGNANT (HHS-HCC): ICD-10-CM

## 2025-03-24 DIAGNOSIS — O10.919 CHRONIC HYPERTENSION IN PREGNANCY (HHS-HCC): ICD-10-CM

## 2025-03-24 LAB
POC BLOOD, URINE: NEGATIVE
POC GLUCOSE, URINE: NEGATIVE MG/DL
POC KETONES, URINE: NEGATIVE MG/DL
POC LEUKOCYTES, URINE: NEGATIVE
POC NITRITE,URINE: NEGATIVE
POC PROTEIN, URINE: NEGATIVE MG/DL

## 2025-03-24 PROCEDURE — 99213 OFFICE O/P EST LOW 20 MIN: CPT | Mod: 25 | Performed by: OBSTETRICS & GYNECOLOGY

## 2025-03-24 PROCEDURE — 81003 URINALYSIS AUTO W/O SCOPE: CPT | Performed by: OBSTETRICS & GYNECOLOGY

## 2025-03-24 PROCEDURE — 59025 FETAL NON-STRESS TEST: CPT | Performed by: OBSTETRICS & GYNECOLOGY

## 2025-03-24 PROCEDURE — 99213 OFFICE O/P EST LOW 20 MIN: CPT | Performed by: OBSTETRICS & GYNECOLOGY

## 2025-03-24 NOTE — PROGRESS NOTES
Ob Follow-up  25     SUBJECTIVE      HPI: Argelia Valentine is a 25 y.o.  at 31w2d here for RPNV.  She has -contractions, -bleeding, or -LOF. Reports +normal fetal movement. Patient reports feeling well.       OBJECTIVE  Visit Vitals  /59   Wt 72.6 kg (160 lb)   LMP 2024   BMI 31.25 kg/m²   OB Status Pregnant   Smoking Status Some Days   BSA 1.75 m²            ASSESSMENT & PLAN    Argelia Valentine is a 25 y.o.  at 31w2d here for the following concerns we addressed today:    Problem List Items Addressed This Visit       31 weeks gestation of pregnancy ([facility])    Overview     Desired provider in labor: [] CNM  [] Physician  [x] Blood Products: [x] Yes, accepts [] No, needs counseling  [x] Initial BMI: Could not be calculated   [x] Prenatal Labs:   [x] Cervical Cancer Screening up to date  [x] Rh status: b+  [x] Genetic Screening:  Ordered today  [x] NT US: (11-13 wks)  [x] Baby ASA (if indicated):  [] Pregnancy dated by:     [x] Anatomy US: (19-20 wks) ordered  [] Federal Sterilization consent signed (if indicated):  [x] 1hr GCT at 24-28wks: Ordered.  Encouraged to go today  [] Rhogam (if indicated):   [] Fetal Surveillance (if indicated):  [x] Tdap (27-32 wks, may be given up to 36 wks if initial window missed):   [] RSV (32-36 wks) (Sept. to end of ):   [] Flu Vaccine: declines    [x] Breastfeeding: Breast and formula  [] Postpartum Birth control method: Undecided  [x] GBS at 36 - 37 wks:  [] 39 weeks discussion of IOL vs. Expectant management:  [] Mode of delivery ( anticipated ):           Anemia of mother in pregnancy, delivered with postpartum condition ([facility])    Overview     Patient scheduled for 2 more Venofer infusions         Chronic hypertension in pregnancy ([facility]) - Primary    Overview     Not on meds  Begin   Stable blood pressure no treatment necessary         History of  delivery, currently pregnant ([facility])    Overview     History of  x 3  Will  need repeat  delivery                No orders of the defined types were placed in this encounter.     Instructed fetal movement count  RTC in 1 weeks      Eric Navarro MD

## 2025-03-24 NOTE — PROCEDURES
Trentongagandeep GAGANDEEP Valentine, a  at 31w2d with an RAHEL of 2025, by Last Menstrual Period, was seen at [facility] for a nonstress test.    Non-Stress Test   Baseline Fetal Heart Rate for Non-Stress Test: 140 BPM  Variability in Waveform for Non-Stress Test: Moderate  Accelerations in Non-Stress Test: Yes, greater than/equal to 15 bpm  Decelerations in Non-Stress Test: None  Contractions in Non-Stress Test: Not present  Acoustic Stimulator for Non-Stress Test: No  Interpretation of Non-Stress Test   Interpretation of Non-Stress Test: Reactive                                    Procedures

## 2025-03-24 NOTE — PROGRESS NOTES
Patient to be scheduled for venofer infusions. Venofer 300mg IV q4days x3 doses total    For Diagnosis: Iron Deficiency Anemia IN PREGNANCY    RAHEL: 5/24/25    Review of Labs:  Lab Results   Component Value Date    HGB 7.5 (L) 03/20/2025    FERRITIN 20 09/12/2023    FERRIPREG 8 (L) 03/17/2025    IRON 18 (L) 09/12/2023    TIBC 430 09/12/2023    TIBCPREG  03/17/2025      Comment:      One or more of the analytes used in this calculation is outside the analytical range.    TIBC-PREGNANCY    Non-pregnancy     240 - 445 ug/dL  First Trimester   278 - 403 ug/dL  Second Trimester  325 - 514 ug/dL  Third Trimester   359 - 609 ug/dL    Please note results will not flag based on reference ranges above.    MCHC 28.7 (L) 03/20/2025    MCV 65 (L) 03/20/2025    MCH 18.6 (L) 03/20/2025      Lab Results   Component Value Date    IRONSAT 4 (L) 09/12/2023    IRONSATP  03/17/2025      Comment:      One or more analytes used in this calculation is outside of the analytical measurement range. Calculation cannot be performed.  % SATURATION-PREGNANCY    Non-pregnancy      25 -  45 %  First Trimester     7 -  57 %  Second Trimester   10 -  44 %  Third Trimester     5 -  37 %    Please note results will not flag based on reference ranges above.        Do labs confirm diagnosis of iron deficiency? Yes    (If NO please reach out to prescribing provider prior to proceeding)      Okay to schedule for treatment as ordered by prescribing provider.     Start date: anytime. Patient will be called to schedule the appt in the next week.     [facility])  [address], [place] [address]  Phone: [phone]

## 2025-03-28 ENCOUNTER — HOSPITAL ENCOUNTER (OUTPATIENT)
Dept: RADIOLOGY | Facility: CLINIC | Age: 25
Discharge: HOME | End: 2025-03-28
Payer: COMMERCIAL

## 2025-03-28 ENCOUNTER — INFUSION (OUTPATIENT)
Dept: INFUSION THERAPY | Facility: CLINIC | Age: 25
End: 2025-03-28
Payer: COMMERCIAL

## 2025-03-28 VITALS
OXYGEN SATURATION: 99 % | RESPIRATION RATE: 16 BRPM | HEART RATE: 92 BPM | TEMPERATURE: 98.3 F | DIASTOLIC BLOOD PRESSURE: 59 MMHG | WEIGHT: 162.15 LBS | BODY MASS INDEX: 31.67 KG/M2 | SYSTOLIC BLOOD PRESSURE: 95 MMHG

## 2025-03-28 DIAGNOSIS — O20.0 THREATENED MISCARRIAGE (HHS-HCC): ICD-10-CM

## 2025-03-28 PROCEDURE — 76820 UMBILICAL ARTERY ECHO: CPT

## 2025-03-28 PROCEDURE — 76816 OB US FOLLOW-UP PER FETUS: CPT

## 2025-03-28 RX ORDER — EPINEPHRINE 0.3 MG/.3ML
0.3 INJECTION SUBCUTANEOUS EVERY 5 MIN PRN
OUTPATIENT
Start: 2025-04-01

## 2025-03-28 RX ORDER — ALBUTEROL SULFATE 0.83 MG/ML
3 SOLUTION RESPIRATORY (INHALATION) AS NEEDED
OUTPATIENT
Start: 2025-04-01

## 2025-03-28 RX ORDER — DIPHENHYDRAMINE HYDROCHLORIDE 50 MG/ML
50 INJECTION, SOLUTION INTRAMUSCULAR; INTRAVENOUS AS NEEDED
OUTPATIENT
Start: 2025-04-01

## 2025-03-28 RX ORDER — FAMOTIDINE 10 MG/ML
20 INJECTION, SOLUTION INTRAVENOUS ONCE AS NEEDED
OUTPATIENT
Start: 2025-04-01

## 2025-03-28 ASSESSMENT — ENCOUNTER SYMPTOMS
ARTHRALGIAS: 0
BLOOD IN STOOL: 0
SHORTNESS OF BREATH: 0
PALPITATIONS: 0
ABDOMINAL PAIN: 0
WHEEZING: 0
VOICE CHANGE: 0
UNEXPECTED WEIGHT CHANGE: 0
WOUND: 0
VOMITING: 0
CHILLS: 0
FEVER: 0
EXTREMITY WEAKNESS: 0
DYSURIA: 0
DEPRESSION: 1
HEADACHES: 0
NUMBNESS: 0
FATIGUE: 0
DIARRHEA: 0
TROUBLE SWALLOWING: 0
COUGH: 0
LIGHT-HEADEDNESS: 0
APPETITE CHANGE: 0
CONSTIPATION: 0
DIZZINESS: 0
FREQUENCY: 0
SORE THROAT: 0
LEG SWELLING: 0
NERVOUS/ANXIOUS: 1
EYE PROBLEMS: 0
MYALGIAS: 0
NAUSEA: 0
HEMATURIA: 0

## 2025-03-28 NOTE — PROGRESS NOTES
[facility]   Infusion Clinic Note   Date: 2025   Name: Argelia Valentine  : 2000   MRN: [id number]         Reason for Visit: New Patient Visit and OP Infusion (PT HERE FOR DOSE 1 OUT OF 3 OF VENOFER 300 MG INFUSION - NEXT DUE IN 1 WEEK)         Today: We administered iron sucrose (Venofer) 300 mg in sodium chloride 0.9% 265 mL IV.       Ordered By: Eric Navarro MD       For a Diagnosis of: Anemia of mother in pregnancy, delivered with postpartum condition ([facility])       At today's visit patient accompanied by: Self      Today's Vitals:   Vitals:    25 1401 25 1600 25 1623   BP: 96/62 90/58  Comment: NURSE NOTIFIED    Pulse: 98 93 92   Resp: 16 16 16   Temp: 36.4 °C (97.5 °F) 36.1 °C (97 °F) 36.8 °C (98.3 °F)   SpO2: 99% 98% 99%   Weight: 73.5 kg (162 lb 2.4 oz)     LMP: 2024             Pre - Treatment Checklist:      - Previous reaction to current treatment: no *PT RECEIVED A DOSE IN THE HOSPITAL WITH NO ISSUES      (Assess patient for the concerns below. Document provider notification as appropriate).  - Active or recent infection with/without current antibiotic use: yes - ABX FOR BV; LAST DOSE BEGINNING OF THIS WEEK  - Recent or planned invasive dental work: no  - Recent or planned surgeries: no  - Recently received or plans to receive vaccinations: no  - Has treatment related toxicities: no  - Any chance may be pregnant:  yes *PT HERE FOR ANEMIA IN PREGNANCY      Pain: 0   - Is the pain different from normal: n/a   - Is prescribing Doctor aware:  n/a      Labs: N/A      Fall Risk Screening: Bowman Fall Risk  History of Falling, Immediate or Within 3 Months: No  Secondary Diagnosis: No  Ambulatory Aid: Walks without aid/bedrest/nurse assist  Intravenous Therapy/Heparin Lock: Yes  Gait/Transferring: Normal/bedrest/immobile  Mental Status: Oriented to own ability  Bowman Fall Risk Score: 20       Review Of Systems:  Review of Systems    Constitutional:  Negative for appetite change, chills, fatigue, fever and unexpected weight change.   HENT:   Negative for hearing loss, mouth sores, sore throat, tinnitus, trouble swallowing and voice change.    Eyes:  Negative for eye problems.   Respiratory:  Negative for cough, shortness of breath and wheezing.    Cardiovascular:  Negative for chest pain, leg swelling and palpitations.   Gastrointestinal:  Negative for abdominal pain, blood in stool, constipation, diarrhea, nausea and vomiting.   Genitourinary:  Negative for dysuria, frequency and hematuria.    Musculoskeletal:  Negative for arthralgias and myalgias.   Skin:  Negative for itching, rash and wound.   Neurological:  Negative for dizziness, extremity weakness, headaches, light-headedness and numbness.   Psychiatric/Behavioral:  Positive for depression. The patient is nervous/anxious.         MANAGING WITH MEDICATIONS         Infusion Readiness:  - Assessment Concerns Related to Infusion: No  - Provider notified: n/a      New Patient Education:    NEW PATIENT MEDICATION EDUCATION PT PROVIDED WITH WRITTEN (ulike PT EDUCATION SHEET) AND VERBAL EDUCATION REGARDING MEDICATION GIVEN. VERIFIED MEDICATION NAME WITH PATIENT AND DISCUSSED REASON FOR USE. BRIEFLY DISCUSSED HOW MEDICATION WORKS AND EDUCATED ON GOAL OF TREATMENT, FREQUENCY OF TREATMENT, ADVERSE RXN'S AND COMMON SIDE EFFECTS TO MONITOR FOR. INSTRUCTED PT TO ASSURE THAT ALL PROVIDERS INCLUDING DENTISTS ARE AWARE OF MEDICATION RECEIVED. DISCUSSED FLOW OF VISIT AND ORIENTED TO INFUSION CENTER. PT VERBALIZES UNDERSTANDING. CALL LIGHT PROVIDED AND PT AWARE TO ALERT STAFF OF ANY CONCERNS DURING TREATMENT.        Treatment Conditions & Drug Specific Questions:    Iron Sucrose (VENOFER)     (Unless otherwise specified on patient specific therapy plan):    REMINDERS:  May cause transient hypotension. Supine position recommended for infusion.   Pregnancy test prior to first dose for patients of  childbearing age.    Recommended Vitals/Observation:  Vitals: Obtain vital signs before and after each infusion.  Observation: 30 minutes after the infusion is complete. Observation complete.      Lab Results   Component Value Date    IRON 18 (L) 09/12/2023    TIBC 430 09/12/2023    FERRITIN 20 09/12/2023     Lab Results   Component Value Date    IRONSAT 4 (L) 09/12/2023    IRONSATP  03/17/2025      Comment:      One or more analytes used in this calculation is outside of the analytical measurement range. Calculation cannot be performed.  % SATURATION-PREGNANCY    Non-pregnancy      25 -  45 %  First Trimester     7 -  57 %  Second Trimester   10 -  44 %  Third Trimester     5 -  37 %    Please note results will not flag based on reference ranges above.      Lab Results   Component Value Date    WBC 7.5 03/20/2025    HGB 7.5 (L) 03/20/2025    HCT 26.1 (L) 03/20/2025    MCV 65 (L) 03/20/2025     03/20/2025            Weight Based Drug Calculations:    WEIGHT BASED DRUGS: NOT APPLICABLE / FLAT DOSE       Post Treatment: Patient tolerated treatment without issue and was discharged in no apparent distress.      Note Authored / Patient Cared for By: Santa Beal RN

## 2025-03-28 NOTE — PATIENT INSTRUCTIONS
Today :We administered iron sucrose (Venofer) 300 mg in sodium chloride 0.9% 265 mL IV.     For:   1. Anemia of mother in pregnancy, delivered with postpartum condition ([facility])         Your next appointment is due in:  1 WEEK        Please read the  Medication Guide that was given to you and reviewed during todays visit.     (Tell all doctors including dentists that you are taking this medication)     Go to the emergency room or call 911 if:  -You have signs of allergic reaction:   -Rash, hives, itching.   -Swollen, blistered, peeling skin.   -Swelling of face, lips, mouth, tongue or throat.   -Tightness of chest, trouble breathing, swallowing or talking     Call your doctor:  - If IV / injection site gets red, warm, swollen, itchy or leaks fluid or pus.     (Leave dressing on your IV site for at least 2 hours and keep area clean and dry  - If you get sick or have symptoms of infection or are not feeling well for any reason.    (Wash your hands often, stay away from people who are sick)  - If you have side effects from your medication that do not go away or are bothersome.     (Refer to the teaching your nurse gave you for side effects to call your doctor about)    - Common side effects may include:  stuffy nose, headache, feeling tired, muscle aches, upset stomach  - Before receiving any vaccines     - [facility] at   If:  - You get sick, are on antibiotics, have had a recent vaccine, have surgery or dental work and your doctor wants your visit rescheduled.  - You need to cancel and reschedule your visit for any reason. Call at least 2 days before your visit if you need to cancel.   - Your insurance changes before your next visit.    (We will need to get approval from your new insurance. This can take up to two weeks.)     The [facility] is opened Monday thru Friday. We are closed on weekends and holidays.   Voice mail will take your call if the center is closed. If you  leave a message please allow 24 hours for a call back during weekdays. If you leave a message on a weekend/holiday, we will call you back the next business day.    A pharmacist is available Monday - Friday from 8:30AM to 3:30PM to help answer any questions you may have about your prescriptions(s). Please call pharmacy at:    [facility]: [phone]  [facility]: [phone]  [facility]: [phone]

## 2025-04-04 ENCOUNTER — APPOINTMENT (OUTPATIENT)
Dept: OBSTETRICS AND GYNECOLOGY | Facility: CLINIC | Age: 25
End: 2025-04-04
Payer: COMMERCIAL

## 2025-04-04 ENCOUNTER — HOSPITAL ENCOUNTER (OUTPATIENT)
Dept: RADIOLOGY | Facility: HOSPITAL | Age: 25
Discharge: HOME | End: 2025-04-04
Payer: COMMERCIAL

## 2025-04-04 ENCOUNTER — APPOINTMENT (OUTPATIENT)
Dept: INFUSION THERAPY | Facility: CLINIC | Age: 25
End: 2025-04-04
Payer: COMMERCIAL

## 2025-04-04 VITALS
BODY MASS INDEX: 32.25 KG/M2 | DIASTOLIC BLOOD PRESSURE: 58 MMHG | TEMPERATURE: 97.7 F | WEIGHT: 165.12 LBS | RESPIRATION RATE: 18 BRPM | OXYGEN SATURATION: 99 % | HEART RATE: 76 BPM | SYSTOLIC BLOOD PRESSURE: 95 MMHG

## 2025-04-04 DIAGNOSIS — O36.5930 MATERNAL CARE FOR OTHER KNOWN OR SUSPECTED POOR FETAL GROWTH, THIRD TRIMESTER, NOT APPLICABLE OR UNSPECIFIED: ICD-10-CM

## 2025-04-04 DIAGNOSIS — O20.0 THREATENED MISCARRIAGE (HHS-HCC): ICD-10-CM

## 2025-04-04 PROCEDURE — 76815 OB US LIMITED FETUS(S): CPT

## 2025-04-04 PROCEDURE — 76820 UMBILICAL ARTERY ECHO: CPT

## 2025-04-04 RX ORDER — FAMOTIDINE 10 MG/ML
20 INJECTION, SOLUTION INTRAVENOUS ONCE AS NEEDED
OUTPATIENT
Start: 2025-04-05

## 2025-04-04 RX ORDER — EPINEPHRINE 0.3 MG/.3ML
0.3 INJECTION SUBCUTANEOUS EVERY 5 MIN PRN
OUTPATIENT
Start: 2025-04-05

## 2025-04-04 RX ORDER — DIPHENHYDRAMINE HYDROCHLORIDE 50 MG/ML
50 INJECTION, SOLUTION INTRAMUSCULAR; INTRAVENOUS AS NEEDED
OUTPATIENT
Start: 2025-04-05

## 2025-04-04 RX ORDER — ALBUTEROL SULFATE 0.83 MG/ML
3 SOLUTION RESPIRATORY (INHALATION) AS NEEDED
OUTPATIENT
Start: 2025-04-05

## 2025-04-04 ASSESSMENT — ENCOUNTER SYMPTOMS
PALPITATIONS: 0
NAUSEA: 0
TROUBLE SWALLOWING: 0
UNEXPECTED WEIGHT CHANGE: 0
CHILLS: 0
BRUISES/BLEEDS EASILY: 0
BLOOD IN STOOL: 0
DYSURIA: 0
LIGHT-HEADEDNESS: 0
WHEEZING: 0
FATIGUE: 0
DEPRESSION: 1
SHORTNESS OF BREATH: 0
APPETITE CHANGE: 0
ABDOMINAL PAIN: 0
DIARRHEA: 0
SORE THROAT: 0
HEMATURIA: 0
DIZZINESS: 0
CONSTIPATION: 0
ARTHRALGIAS: 0
WOUND: 0
MYALGIAS: 0
LEG SWELLING: 0
VOICE CHANGE: 0
NUMBNESS: 0
VOMITING: 0
HEADACHES: 0
NERVOUS/ANXIOUS: 1
EYE PROBLEMS: 0
EXTREMITY WEAKNESS: 0
FEVER: 0
FREQUENCY: 0
COUGH: 0

## 2025-04-04 NOTE — PROGRESS NOTES
[facility]   Infusion Clinic Note   Date: 2025   Name: Argelia Valentine  : 2000   MRN: [id number]         Reason for Visit: OP Infusion and Follow-up (PT HERE FOR VENOFER 300 MG INFUSION/NEXT APPT: 4 DAYS)         Today: We administered iron sucrose (Venofer) 300 mg in sodium chloride 0.9% 265 mL IV.       Ordered By: Eric Navarro MD       For a Diagnosis of: Anemia of mother in pregnancy, delivered with postpartum condition       At today's visit patient accompanied by: Self      Today's Vitals:   Vitals:    25 0921 25 1143   BP: 103/65 95/58   Pulse: 80 76   Resp: 18 18   Temp: 36.2 °C (97.2 °F) 36.5 °C (97.7 °F)   TempSrc: Temporal    SpO2: 99% 99%   Weight: 74.9 kg (165 lb 2 oz)    LMP: 2024             Pre - Treatment Checklist:      - Previous reaction to current treatment: no      (Assess patient for the concerns below. Document provider notification as appropriate).  - Active or recent infection with/without current antibiotic use: no  - Recent or planned invasive dental work: no  - Recent or planned surgeries: no  - Recently received or plans to receive vaccinations: no  - Has treatment related toxicities: no  - Any chance may be pregnant:  yes      Pain: 0   - Is the pain different from normal: n/a   - Is prescribing Doctor aware:  n/a      Labs: N/A      Fall Risk Screening: Bowman Fall Risk  History of Falling, Immediate or Within 3 Months: No  Secondary Diagnosis: No  Ambulatory Aid: Walks without aid/bedrest/nurse assist  Intravenous Therapy/Heparin Lock: Yes  Gait/Transferring: Normal/bedrest/immobile  Mental Status: Oriented to own ability  Bowman Fall Risk Score: 20       Review Of Systems:  Review of Systems   Constitutional:  Negative for appetite change, chills, fatigue, fever and unexpected weight change.   HENT:   Negative for hearing loss, mouth sores, sore throat, tinnitus, trouble swallowing and voice change.    Eyes:  Negative for eye  problems.   Respiratory:  Negative for cough, shortness of breath and wheezing.    Cardiovascular:  Negative for chest pain, leg swelling and palpitations.   Gastrointestinal:  Negative for abdominal pain, blood in stool, constipation, diarrhea, nausea and vomiting.   Genitourinary:  Negative for dysuria, frequency and hematuria.    Musculoskeletal:  Negative for arthralgias and myalgias.   Skin:  Negative for itching, rash and wound.   Neurological:  Negative for dizziness, extremity weakness, headaches, light-headedness and numbness.   Hematological:  Does not bruise/bleed easily.   Psychiatric/Behavioral:  Positive for depression. The patient is nervous/anxious.         MANAGED          Infusion Readiness:  - Assessment Concerns Related to Infusion: No  - Provider notified: n/a      New Patient Education:    N/A (returning patient for continuation of therapy. Ongoing education provided as needed.)        Treatment Conditions & Drug Specific Questions:    Iron Sucrose (VENOFER)     (Unless otherwise specified on patient specific therapy plan):    REMINDERS:  May cause transient hypotension. Supine position recommended for infusion.   Pregnancy test prior to first dose for patients of childbearing age.    Recommended Vitals/Observation:  Vitals: Obtain vital signs before and after each infusion.  Observation: 30 minutes after the infusion is complete. Observation complete.      Lab Results   Component Value Date    IRON 18 (L) 09/12/2023    TIBC 430 09/12/2023    FERRITIN 20 09/12/2023     Lab Results   Component Value Date    IRONSAT 4 (L) 09/12/2023    IRONSATP  03/17/2025      Comment:      One or more analytes used in this calculation is outside of the analytical measurement range. Calculation cannot be performed.  % SATURATION-PREGNANCY    Non-pregnancy      25 -  45 %  First Trimester     7 -  57 %  Second Trimester   10 -  44 %  Third Trimester     5 -  37 %    Please note results will not flag based on  reference ranges above.      Lab Results   Component Value Date    WBC 7.5 03/20/2025    HGB 7.5 (L) 03/20/2025    HCT 26.1 (L) 03/20/2025    MCV 65 (L) 03/20/2025     03/20/2025            Weight Based Drug Calculations:    WEIGHT BASED DRUGS: NOT APPLICABLE / FLAT DOSE       Post Treatment: Patient tolerated treatment without issue and was discharged in no apparent distress.      Note Authored / Patient Cared for By: LORAINE VINCENT RN

## 2025-04-04 NOTE — PATIENT INSTRUCTIONS
Today :We administered iron sucrose (Venofer) 300 mg in sodium chloride 0.9% 265 mL IV.     For:   1. Anemia of mother in pregnancy, delivered with postpartum condition         Your next appointment is due in:  4 DAYS        Please read the  Medication Guide that was given to you and reviewed during todays visit.     (Tell all doctors including dentists that you are taking this medication)     Go to the emergency room or call 911 if:  -You have signs of allergic reaction:   -Rash, hives, itching.   -Swollen, blistered, peeling skin.   -Swelling of face, lips, mouth, tongue or throat.   -Tightness of chest, trouble breathing, swallowing or talking     Call your doctor:  - If IV / injection site gets red, warm, swollen, itchy or leaks fluid or pus.     (Leave dressing on your IV site for at least 2 hours and keep area clean and dry  - If you get sick or have symptoms of infection or are not feeling well for any reason.    (Wash your hands often, stay away from people who are sick)  - If you have side effects from your medication that do not go away or are bothersome.     (Refer to the teaching your nurse gave you for side effects to call your doctor about)    - Common side effects may include:  stuffy nose, headache, feeling tired, muscle aches, upset stomach  - Before receiving any vaccines     - Call [facility] at   If:  - You get sick, are on antibiotics, have had a recent vaccine, have surgery or dental work and your doctor wants your visit rescheduled.  - You need to cancel and reschedule your visit for any reason. Call at least 2 days before your visit if you need to cancel.   - Your insurance changes before your next visit.    (We will need to get approval from your new insurance. This can take up to two weeks.)     [facility] is opened Monday thru Friday. We are closed on weekends and holidays.   Voice mail will take your call if the center is closed. If you leave a  message please allow 24 hours for a call back during weekdays. If you leave a message on a weekend/holiday, we will call you back the next business day.    A pharmacist is available Monday - Friday from 8:30AM to 3:30PM to help answer any questions you may have about your prescriptions(s). Please call pharmacy at:    [facility]: [phone]  [facility]: [phone]  [facility]: [phone]

## 2025-04-10 ENCOUNTER — TELEPHONE (OUTPATIENT)
Dept: OBSTETRICS AND GYNECOLOGY | Facility: CLINIC | Age: 25
End: 2025-04-10
Payer: COMMERCIAL

## 2025-04-10 NOTE — TELEPHONE ENCOUNTER
THIS PATIENT CALLED AND STATED SHE IS HAVING, REALLY BAD PAIN, SHE HAD TO LEAVE WORK/CLASS SHE STATED SHE NEVER HAD PAIN THAT BAD.    PT WAS INFORM THAT DR. MIRANDA HAD JUST LEFT THE OFFICE, SHE CAN BE REACHED [phone] PLEASE.    THANK YOU

## 2025-04-10 NOTE — TELEPHONE ENCOUNTER
Spoke with pt-c/o severe, 10/10, sharp pain to pelvis, hips and BLE.   Pt also reports increased swelling to lower extremities.   Pt reports +FM.   Pt denies VB/LOF/contractions/HA/R upper CP.   Pt states she is drinking plenty of fluid.   Denies taking anything for pain or wearing support belt.   Pt had MADDI, BPP and iron infusion tomorrow.  Pt states she would like to go to triage, RN agreed.   Pt heading there now, will keep apts as scheduled.

## 2025-04-11 ENCOUNTER — APPOINTMENT (OUTPATIENT)
Dept: INFUSION THERAPY | Facility: CLINIC | Age: 25
End: 2025-04-11
Payer: COMMERCIAL

## 2025-04-11 ENCOUNTER — PROCEDURE VISIT (OUTPATIENT)
Dept: OBSTETRICS AND GYNECOLOGY | Facility: CLINIC | Age: 25
End: 2025-04-11
Payer: COMMERCIAL

## 2025-04-11 ENCOUNTER — HOSPITAL ENCOUNTER (OUTPATIENT)
Dept: RADIOLOGY | Facility: CLINIC | Age: 25
Discharge: HOME | End: 2025-04-11
Payer: COMMERCIAL

## 2025-04-11 VITALS
TEMPERATURE: 97.6 F | BODY MASS INDEX: 32.21 KG/M2 | DIASTOLIC BLOOD PRESSURE: 64 MMHG | WEIGHT: 164.9 LBS | OXYGEN SATURATION: 99 % | RESPIRATION RATE: 16 BRPM | SYSTOLIC BLOOD PRESSURE: 101 MMHG | HEART RATE: 93 BPM

## 2025-04-11 VITALS — DIASTOLIC BLOOD PRESSURE: 58 MMHG | SYSTOLIC BLOOD PRESSURE: 93 MMHG | WEIGHT: 163 LBS | BODY MASS INDEX: 31.83 KG/M2

## 2025-04-11 DIAGNOSIS — O99.511 ASTHMA AFFECTING PREGNANCY IN FIRST TRIMESTER (HHS-HCC): ICD-10-CM

## 2025-04-11 DIAGNOSIS — Z3A.31 31 WEEKS GESTATION OF PREGNANCY (HHS-HCC): ICD-10-CM

## 2025-04-11 DIAGNOSIS — Z30.09 CONTRACEPTIVE EDUCATION: ICD-10-CM

## 2025-04-11 DIAGNOSIS — O02.0 ANEMBRYONIC PREGNANCY (HHS-HCC): ICD-10-CM

## 2025-04-11 DIAGNOSIS — O10.919 CHRONIC HYPERTENSION IN PREGNANCY (HHS-HCC): ICD-10-CM

## 2025-04-11 DIAGNOSIS — O34.219 HISTORY OF CESAREAN DELIVERY, CURRENTLY PREGNANT (HHS-HCC): ICD-10-CM

## 2025-04-11 DIAGNOSIS — O20.0 THREATENED MISCARRIAGE (HHS-HCC): ICD-10-CM

## 2025-04-11 DIAGNOSIS — J45.909 ASTHMA AFFECTING PREGNANCY IN FIRST TRIMESTER (HHS-HCC): ICD-10-CM

## 2025-04-11 DIAGNOSIS — O36.5930 INTRAUTERINE GROWTH RESTRICTION (IUGR) AFFECTING CARE OF MOTHER, THIRD TRIMESTER, SINGLE GESTATION: ICD-10-CM

## 2025-04-11 DIAGNOSIS — Z98.891 HISTORY OF 3 CESAREAN SECTIONS: Primary | ICD-10-CM

## 2025-04-11 DIAGNOSIS — O21.0 HYPEREMESIS GRAVIDARUM (HHS-HCC): ICD-10-CM

## 2025-04-11 PROBLEM — Z34.93 THIRD TRIMESTER PREGNANCY (HHS-HCC): Status: RESOLVED | Noted: 2025-01-25 | Resolved: 2025-04-11

## 2025-04-11 PROCEDURE — 76815 OB US LIMITED FETUS(S): CPT

## 2025-04-11 PROCEDURE — 76819 FETAL BIOPHYS PROFIL W/O NST: CPT

## 2025-04-11 RX ORDER — EPINEPHRINE 0.3 MG/.3ML
0.3 INJECTION SUBCUTANEOUS EVERY 5 MIN PRN
OUTPATIENT
Start: 2025-04-12

## 2025-04-11 RX ORDER — FAMOTIDINE 10 MG/ML
20 INJECTION, SOLUTION INTRAVENOUS ONCE AS NEEDED
OUTPATIENT
Start: 2025-04-12

## 2025-04-11 RX ORDER — FAMOTIDINE 10 MG/ML
20 INJECTION, SOLUTION INTRAVENOUS ONCE
OUTPATIENT
Start: 2025-04-11 | End: 2025-04-11

## 2025-04-11 RX ORDER — ALBUTEROL SULFATE 0.83 MG/ML
3 SOLUTION RESPIRATORY (INHALATION) AS NEEDED
OUTPATIENT
Start: 2025-04-12

## 2025-04-11 RX ORDER — DIPHENHYDRAMINE HYDROCHLORIDE 50 MG/ML
50 INJECTION, SOLUTION INTRAMUSCULAR; INTRAVENOUS AS NEEDED
OUTPATIENT
Start: 2025-04-12

## 2025-04-11 RX ORDER — SODIUM CITRATE AND CITRIC ACID MONOHYDRATE 334; 500 MG/5ML; MG/5ML
30 SOLUTION ORAL ONCE
OUTPATIENT
Start: 2025-04-11 | End: 2025-04-11

## 2025-04-11 ASSESSMENT — ENCOUNTER SYMPTOMS
DEPRESSION: 1
WHEEZING: 0
CONSTIPATION: 0
BRUISES/BLEEDS EASILY: 0
VOICE CHANGE: 0
FATIGUE: 0
SHORTNESS OF BREATH: 0
PALPITATIONS: 0
LOSS OF SENSATION IN FEET: 0
WOUND: 0
LIGHT-HEADEDNESS: 0
BLOOD IN STOOL: 0
NERVOUS/ANXIOUS: 1
HEADACHES: 0
DYSURIA: 0
EXTREMITY WEAKNESS: 0
MYALGIAS: 0
TROUBLE SWALLOWING: 0
LEG SWELLING: 0
ABDOMINAL PAIN: 0
HEMATURIA: 0
APPETITE CHANGE: 0
UNEXPECTED WEIGHT CHANGE: 0
DIZZINESS: 0
FREQUENCY: 0
VOMITING: 0
ARTHRALGIAS: 0
DEPRESSION: 0
FEVER: 0
SORE THROAT: 0
OCCASIONAL FEELINGS OF UNSTEADINESS: 0
COUGH: 0
NAUSEA: 0
DIARRHEA: 0
NUMBNESS: 0
EYE PROBLEMS: 0
CHILLS: 0

## 2025-04-11 NOTE — PROGRESS NOTES
Argelia Valentine is a 25 y.o.  at 33w6d GA here for OB visit.      Subjective     No acute complaints.  Denies vaginal bleeding, leakage of fluid, painful regular uterine contractions, decreased fetal movement.     OB History    Para Term  AB Living   6 3 2 1 2 3   SAB IAB Ectopic Multiple Live Births   1 1     3      # Outcome Date GA Lbr Jonn/2nd Weight Sex Type Anes PTL Lv   6 Current            5 SAB      Complete      4 Term 2018    M CS-LTranv      3 Term     M CS-LTranv      2  12/23/15 32w0d   F CS-LTranv   BETZY      Complications: Severe pre-eclampsia   1 IAB 12     D&E Gen            Objective   Physical Exam  Weight: 73.9 kg (163 lb)  Expected Total Weight Gain: 5 kg (11 lb)-9 kg (19 lb)   Pregravid BMI: 31.83  BP: 93/58  --     --  Fetal Heart Rate: 135             --  Physical Exam  Constitutional:       General: She is not in acute distress.     Appearance: She is not ill-appearing, toxic-appearing or diaphoretic.   Pulmonary:      Effort: Pulmonary effort is normal.   Neurological:      Mental Status: She is alert.   Psychiatric:         Mood and Affect: Mood normal.   Vitals reviewed.          ASSESSMENT & PLAN    Argelia Valentine is a 25 y.o.  at 33w6d here for the following concerns we addressed today:    Problem List Items Addressed This Visit       31 weeks gestation of pregnancy ([facility])    Overview     Desired provider in labor: [] CNM  [] Physician  [x] Blood Products: [x] Yes, accepts [] No, needs counseling  [x] Initial BMI: Could not be calculated   [x] Prenatal Labs:   [x] Cervical Cancer Screening up to date  [x] Rh status: b+  [x] Genetic Screening:  Ordered today  [x] NT US: (11-13 wks)  [x] Baby ASA (if indicated):  [] Pregnancy dated by:     [x] Anatomy US: (19-20 wks) ordered  [] Federal Sterilization consent signed (if indicated):  [x] 1hr GCT at 24-28wks: Ordered.  Encouraged to go today  [] Rhogam (if indicated):   [] Fetal Surveillance (if  indicated):  [x] Tdap (27-32 wks, may be given up to 36 wks if initial window missed):   [] RSV (32-36 wks) (Sept. to end of ):   [] Flu Vaccine: declines    [x] Breastfeeding: Breast and formula  [] Postpartum Birth control method: Undecided  [x] GBS at 36 - 37 wks:  [] 39 weeks discussion of IOL vs. Expectant management:  [] Mode of delivery ( anticipated ):           Hyperemesis gravidarum ([facility])    Overview     Unison and B6  Pressure sea bands         History of  delivery, currently pregnant ([facility])    Overview     History of  x 3  Will need repeat  delivery           RESOLVED: History of 3  sections - Primary    Relevant Orders    Case Request Operating Room:  Section (Completed)    Contraceptive education    Overview     10/8: Undecided about contraception  : Patient counseled in detail contraceptive options including Mirena IUD, Depo-Provera, tubal sterilization.  Patient was also counseled in detail about the limitations of of emergency contraception as well as the mechanism of action and failure particularly in the setting of sex after ovulation.         Chronic hypertension in pregnancy ([facility])    Overview     Not on meds  Begin   Stable blood pressure no treatment necessary         Relevant Orders    Fetal nonstress test    Asthma affecting pregnancy in first trimester ([facility])    Overview     On albuterol         Anemia of mother in pregnancy, delivered with postpartum condition    Overview     Patient scheduled for 2 more Venofer infusions         Anembryonic pregnancy ([facility])    Overview     No fetal pole on 9 week preg  Repeat U/S  Follow up 1 week              MADDI.  Reactive NST completed.  Patient counseled in detail about delivery timing.  Patient strongly prefers for delivery to be on  by Dr. Navarro.  Given chronic hypertension not on medications this is an appropriate gestational age and she will be 38 weeks.  Discussed that  this will be a scheduled surgery and will not require induction or breaking water.  Patient does desire to keep her placenta for placental encapsulation and though she was desiring for delayed cord clamping until the plan umbilical cord becomes white she expressed understanding when counseled that in a  delivery we are unable to wait that long as she will be bleeding but delayed cord clamping is routinely done for 30 seconds.  Patient was also counseled in detail about contraceptive options.  Of note patient missed her IV iron infusion today as she was unable to wait for her appointment which was running late.     Case request sent contacted with her delivery schedulers as well as Dr. Navarro to coordinate care.    Follow up in 1 weeks      Delon Long MD

## 2025-04-11 NOTE — PROGRESS NOTES
[facility]   Infusion Clinic Note   Date: 2025   Name: Argelia Valentine  : 2000   MRN: [id number]         Reason for Visit: Follow-up and OP Infusion (PATIENT IS HERE FOR VENOFER 300 MG INFUSION (DOSE 3/3)./** PT REFUSED TREATMENT TODAY D/T TIME CONSTRAINTS.)         Today: Argelia Valnetine had no medications administered during this visit.       Ordered By: Eric Navarro MD       For a Diagnosis of: Anemia of mother in pregnancy, delivered with postpartum condition       At today's visit patient accompanied by: Self      Today's Vitals:   Vitals:    25 1001   BP: 101/64   Pulse: 93   Resp: 16   Temp: 36.4 °C (97.6 °F)   SpO2: 99%   Weight: 74.8 kg (164 lb 14.5 oz)   LMP: 2024             Pre - Treatment Checklist:      - Previous reaction to current treatment: no      (Assess patient for the concerns below. Document provider notification as appropriate).  - Active or recent infection with/without current antibiotic use: no  - Recent or planned invasive dental work: no  - Recent or planned surgeries: no  - Recently received or plans to receive vaccinations: no  - Has treatment related toxicities: no  - Any chance may be pregnant:  yes DUE IN 36 DAYS      Pain: 7   - Is the pain different from normal: no   - Is prescribing Doctor aware:  no      Labs: N/A      Fall Risk Screening: Bowman Fall Risk  History of Falling, Immediate or Within 3 Months: No  Secondary Diagnosis: No  Ambulatory Aid: Walks without aid/bedrest/nurse assist  Intravenous Therapy/Heparin Lock: Yes  Gait/Transferring: Normal/bedrest/immobile  Mental Status: Oriented to own ability  Bowman Fall Risk Score: 20       Review Of Systems:  Review of Systems   Constitutional:  Negative for appetite change, chills, fatigue, fever and unexpected weight change.   HENT:   Negative for hearing loss, mouth sores, sore throat, tinnitus, trouble swallowing and voice change.    Eyes:  Negative for eye problems.    Respiratory:  Negative for cough, shortness of breath and wheezing.    Cardiovascular:  Negative for chest pain, leg swelling and palpitations.   Gastrointestinal:  Negative for abdominal pain, blood in stool, constipation, diarrhea, nausea and vomiting.   Genitourinary:  Negative for dysuria, frequency and hematuria.    Musculoskeletal:  Negative for arthralgias and myalgias.   Skin:  Negative for itching, rash and wound.   Neurological:  Negative for dizziness, extremity weakness, headaches, light-headedness and numbness.   Hematological:  Does not bruise/bleed easily.   Psychiatric/Behavioral:  Positive for depression. The patient is nervous/anxious.         PATIENT MANAGES ANXIETY AND DEPRESSION WITH MED.         Infusion Readiness:  - Assessment Concerns Related to Infusion: No  - Provider notified: n/a      New Patient Education:    N/A (returning patient for continuation of therapy. Ongoing education provided as needed.)        Treatment Conditions & Drug Specific Questions:    Iron Sucrose (VENOFER)     (Unless otherwise specified on patient specific therapy plan):    REMINDERS:  May cause transient hypotension. Supine position recommended for infusion.   Pregnancy test prior to first dose for patients of childbearing age.    Recommended Vitals/Observation:  Vitals: Obtain vital signs before and after each infusion.  Observation: 30 minutes after the infusion is complete. Patient declined to stay for observation. Educated on signs and symptoms to monitor for and when to present to the ED     Lab Results   Component Value Date    IRON 18 (L) 09/12/2023    TIBC 430 09/12/2023    FERRITIN 20 09/12/2023     Lab Results   Component Value Date    IRONSAT 4 (L) 09/12/2023    IRONSATP  03/17/2025      Comment:      One or more analytes used in this calculation is outside of the analytical measurement range. Calculation cannot be performed.  % SATURATION-PREGNANCY    Non-pregnancy      25 -  45 %  First Trimester      7 -  57 %  Second Trimester   10 -  44 %  Third Trimester     5 -  37 %    Please note results will not flag based on reference ranges above.      Lab Results   Component Value Date    WBC 7.5 03/20/2025    HGB 7.5 (L) 03/20/2025    HCT 26.1 (L) 03/20/2025    MCV 65 (L) 03/20/2025     03/20/2025            Weight Based Drug Calculations:    WEIGHT BASED DRUGS: NOT APPLICABLE / FLAT DOSE       Post Treatment:  PT REFUSED TREATMENT TODAY D/T TIME CONSTRAINTS.      Note Authored / Patient Cared for By: Ashleigh Juarez RN

## 2025-04-11 NOTE — PROCEDURES
Argelia Valentine, a  at 33w6d with an RAHEL of 2025, by Last Menstrual Period, was seen at [facility] for a nonstress test.    Non-Stress Test   Baseline Fetal Heart Rate for Non-Stress Test: 135 BPM  Variability in Waveform for Non-Stress Test: Moderate  Accelerations in Non-Stress Test: Yes  Decelerations in Non-Stress Test: None  Contractions in Non-Stress Test: Not present  Interpretation of Non-Stress Test   Interpretation of Non-Stress Test: Reactive

## 2025-04-14 ENCOUNTER — TELEPHONE (OUTPATIENT)
Dept: OBSTETRICS AND GYNECOLOGY | Facility: CLINIC | Age: 25
End: 2025-04-14
Payer: COMMERCIAL

## 2025-04-14 PROBLEM — Z98.891 HISTORY OF 3 CESAREAN SECTIONS: Status: ACTIVE | Noted: 2025-04-11

## 2025-04-16 LAB
ATRIAL RATE: 94 BPM
P AXIS: 63 DEGREES
P OFFSET: 201 MS
P ONSET: 150 MS
PR INTERVAL: 142 MS
Q ONSET: 221 MS
QRS COUNT: 16 BEATS
QRS DURATION: 94 MS
QT INTERVAL: 358 MS
QTC CALCULATION(BAZETT): 447 MS
QTC FREDERICIA: 415 MS
R AXIS: 61 DEGREES
T AXIS: 29 DEGREES
T OFFSET: 400 MS
VENTRICULAR RATE: 94 BPM

## 2025-04-18 ENCOUNTER — HOSPITAL ENCOUNTER (OUTPATIENT)
Dept: RADIOLOGY | Facility: CLINIC | Age: 25
Discharge: HOME | End: 2025-04-18
Payer: COMMERCIAL

## 2025-04-18 ENCOUNTER — APPOINTMENT (OUTPATIENT)
Dept: INFUSION THERAPY | Facility: CLINIC | Age: 25
End: 2025-04-18
Payer: COMMERCIAL

## 2025-04-18 ENCOUNTER — APPOINTMENT (OUTPATIENT)
Dept: RADIOLOGY | Facility: CLINIC | Age: 25
End: 2025-04-18
Payer: COMMERCIAL

## 2025-04-18 VITALS
WEIGHT: 162.26 LBS | DIASTOLIC BLOOD PRESSURE: 61 MMHG | TEMPERATURE: 97.2 F | BODY MASS INDEX: 31.69 KG/M2 | OXYGEN SATURATION: 97 % | HEART RATE: 83 BPM | SYSTOLIC BLOOD PRESSURE: 97 MMHG | RESPIRATION RATE: 16 BRPM

## 2025-04-18 DIAGNOSIS — O20.0 THREATENED MISCARRIAGE (HHS-HCC): ICD-10-CM

## 2025-04-18 PROCEDURE — 76816 OB US FOLLOW-UP PER FETUS: CPT

## 2025-04-18 PROCEDURE — 76820 UMBILICAL ARTERY ECHO: CPT

## 2025-04-18 RX ORDER — DIPHENHYDRAMINE HYDROCHLORIDE 50 MG/ML
50 INJECTION, SOLUTION INTRAMUSCULAR; INTRAVENOUS AS NEEDED
Status: CANCELLED | OUTPATIENT
Start: 2025-04-18

## 2025-04-18 RX ORDER — EPINEPHRINE 0.3 MG/.3ML
0.3 INJECTION SUBCUTANEOUS EVERY 5 MIN PRN
Status: CANCELLED | OUTPATIENT
Start: 2025-04-18

## 2025-04-18 RX ORDER — FAMOTIDINE 10 MG/ML
20 INJECTION, SOLUTION INTRAVENOUS ONCE AS NEEDED
Status: CANCELLED | OUTPATIENT
Start: 2025-04-18

## 2025-04-18 RX ORDER — ALBUTEROL SULFATE 0.83 MG/ML
3 SOLUTION RESPIRATORY (INHALATION) AS NEEDED
Status: CANCELLED | OUTPATIENT
Start: 2025-04-18

## 2025-04-18 ASSESSMENT — ENCOUNTER SYMPTOMS
DEPRESSION: 1
DIARRHEA: 0
DIZZINESS: 0
DYSURIA: 0
CHILLS: 0
ABDOMINAL PAIN: 0
TROUBLE SWALLOWING: 0
UNEXPECTED WEIGHT CHANGE: 0
COUGH: 0
SORE THROAT: 0
HEADACHES: 0
HEMATURIA: 0
NERVOUS/ANXIOUS: 1
BLOOD IN STOOL: 0
VOMITING: 0
VOICE CHANGE: 0
BRUISES/BLEEDS EASILY: 0
FEVER: 0
APPETITE CHANGE: 0
ARTHRALGIAS: 0
MYALGIAS: 0
NAUSEA: 0
FREQUENCY: 0
WHEEZING: 0
CONSTIPATION: 0
SHORTNESS OF BREATH: 0
EXTREMITY WEAKNESS: 0
LEG SWELLING: 0
LIGHT-HEADEDNESS: 0
NUMBNESS: 0
WOUND: 0
FATIGUE: 0
PALPITATIONS: 0
EYE PROBLEMS: 0

## 2025-04-18 NOTE — PROGRESS NOTES
[facility]   Infusion Clinic Note   Date: 2025   Name: Argelia Valentine  : 2000   MRN: [id number]         Reason for Visit: OP Infusion (PT HERE FOR DOSE 3 OUT OF 3 OF VENOFER 300 MG INFUSION)         Today: We administered iron sucrose (Venofer) 300 mg in sodium chloride 0.9% 265 mL IV.       Ordered By: RUBEN      For a Diagnosis of: Anemia of mother in pregnancy, delivered with postpartum condition       At today's visit patient accompanied by: Self      Today's Vitals:   Vitals:    25 1455 25 1650 25 1716   BP: 107/68 90/56 97/61   Pulse: 86 77 83   Resp: 16 16 16   Temp: 36.3 °C (97.4 °F) 36.5 °C (97.7 °F) 36.2 °C (97.2 °F)   SpO2: 98% 98% 97%   Weight: 73.6 kg (162 lb 4.1 oz)     LMP: 2024             Pre - Treatment Checklist:      - Previous reaction to current treatment: no      (Assess patient for the concerns below. Document provider notification as appropriate).  - Active or recent infection with/without current antibiotic use: no  - Recent or planned invasive dental work: no  - Recent or planned surgeries: no  - Recently received or plans to receive vaccinations: no  - Has treatment related toxicities: no  - Any chance may be pregnant:  yes *PT IS PREGNANT, GETTING IRON FOR ANEMIA IN PREGNANCY      Pain: 0   - Is the pain different from normal: n/a   - Is prescribing Doctor aware:  n/a      Labs: N/A      Fall Risk Screening: Bowman Fall Risk  History of Falling, Immediate or Within 3 Months: No  Secondary Diagnosis: No  Ambulatory Aid: Walks without aid/bedrest/nurse assist  Intravenous Therapy/Heparin Lock: Yes  Gait/Transferring: Normal/bedrest/immobile  Mental Status: Oriented to own ability  Bowman Fall Risk Score: 20       Review Of Systems:  Review of Systems   Constitutional:  Negative for appetite change, chills, fatigue, fever and unexpected weight change.   HENT:   Negative for hearing loss, mouth sores, sore throat, tinnitus,  trouble swallowing and voice change.    Eyes:  Negative for eye problems.   Respiratory:  Negative for cough, shortness of breath and wheezing.    Cardiovascular:  Negative for chest pain, leg swelling and palpitations.   Gastrointestinal:  Negative for abdominal pain, blood in stool, constipation, diarrhea, nausea and vomiting.   Genitourinary:  Negative for dysuria, frequency and hematuria.    Musculoskeletal:  Negative for arthralgias and myalgias.   Skin:  Negative for itching, rash and wound.   Neurological:  Negative for dizziness, extremity weakness, headaches, light-headedness and numbness.   Hematological:  Does not bruise/bleed easily.   Psychiatric/Behavioral:  Positive for depression. The patient is nervous/anxious.         MANAGING WITH MEDICATIONS         Infusion Readiness:  - Assessment Concerns Related to Infusion: No  - Provider notified: n/a      New Patient Education:    N/A (returning patient for continuation of therapy. Ongoing education provided as needed.)        Treatment Conditions & Drug Specific Questions:    Iron Sucrose (VENOFER)     (Unless otherwise specified on patient specific therapy plan):    REMINDERS:  May cause transient hypotension. Supine position recommended for infusion.   Pregnancy test prior to first dose for patients of childbearing age.    Recommended Vitals/Observation:  Vitals: Obtain vital signs before and after each infusion.  Observation: 30 minutes after the infusion is complete. Observation complete.      Lab Results   Component Value Date    IRON 18 (L) 09/12/2023    TIBC 430 09/12/2023    FERRITIN 20 09/12/2023     Lab Results   Component Value Date    IRONSAT 4 (L) 09/12/2023    IRONSATP  03/17/2025      Comment:      One or more analytes used in this calculation is outside of the analytical measurement range. Calculation cannot be performed.  % SATURATION-PREGNANCY    Non-pregnancy      25 -  45 %  First Trimester     7 -  57 %  Second Trimester   10 -  44  %  Third Trimester     5 -  37 %    Please note results will not flag based on reference ranges above.      Lab Results   Component Value Date    WBC 7.5 03/20/2025    HGB 7.5 (L) 03/20/2025    HCT 26.1 (L) 03/20/2025    MCV 65 (L) 03/20/2025     03/20/2025            Weight Based Drug Calculations:    WEIGHT BASED DRUGS: NOT APPLICABLE / FLAT DOSE       Post Treatment: Patient tolerated treatment without issue and was discharged in no apparent distress.      Note Authored / Patient Cared for By: Santa Beal RN

## 2025-04-18 NOTE — PATIENT INSTRUCTIONS
Today :We administered iron sucrose (Venofer) 300 mg in sodium chloride 0.9% 265 mL IV.     For:   1. Anemia of mother in pregnancy, delivered with postpartum condition         Your next appointment is due in:  N/A        Please read the  Medication Guide that was given to you and reviewed during todays visit.     (Tell all doctors including dentists that you are taking this medication)     Go to the emergency room or call 911 if:  -You have signs of allergic reaction:   -Rash, hives, itching.   -Swollen, blistered, peeling skin.   -Swelling of face, lips, mouth, tongue or throat.   -Tightness of chest, trouble breathing, swallowing or talking     Call your doctor:  - If IV / injection site gets red, warm, swollen, itchy or leaks fluid or pus.     (Leave dressing on your IV site for at least 2 hours and keep area clean and dry  - If you get sick or have symptoms of infection or are not feeling well for any reason.    (Wash your hands often, stay away from people who are sick)  - If you have side effects from your medication that do not go away or are bothersome.     (Refer to the teaching your nurse gave you for side effects to call your doctor about)    - Common side effects may include:  stuffy nose, headache, feeling tired, muscle aches, upset stomach  - Before receiving any vaccines     - Call [facility] at   If:  - You get sick, are on antibiotics, have had a recent vaccine, have surgery or dental work and your doctor wants your visit rescheduled.  - You need to cancel and reschedule your visit for any reason. Call at least 2 days before your visit if you need to cancel.   - Your insurance changes before your next visit.    (We will need to get approval from your new insurance. This can take up to two weeks.)     The [facility] is opened Monday thru Friday. We are closed on weekends and holidays.   Voice mail will take your call if the center is closed. If you leave a  message please allow 24 hours for a call back during weekdays. If you leave a message on a weekend/holiday, we will call you back the next business day.    A pharmacist is available Monday - Friday from 8:30AM to 3:30PM to help answer any questions you may have about your prescriptions(s). Please call pharmacy at:    [facility]: [phone]  [facility]: [phone]  [facility]: [phone]

## 2025-04-23 ASSESSMENT — ENCOUNTER SYMPTOMS
NAUSEA: 0
FEVER: 0
DIARRHEA: 0
ABDOMINAL PAIN: 0
DYSURIA: 0
FREQUENCY: 1
HEMATURIA: 0
FLANK PAIN: 0
CONSTIPATION: 0
CHILLS: 0
SORE THROAT: 0
ANOREXIA: 0
HEADACHES: 0
BACK PAIN: 1
VOMITING: 0

## 2025-04-25 ENCOUNTER — HOSPITAL ENCOUNTER (OUTPATIENT)
Dept: RADIOLOGY | Facility: CLINIC | Age: 25
Discharge: HOME | End: 2025-04-25
Payer: COMMERCIAL

## 2025-04-25 ENCOUNTER — APPOINTMENT (OUTPATIENT)
Dept: OBSTETRICS AND GYNECOLOGY | Facility: CLINIC | Age: 25
End: 2025-04-25
Payer: COMMERCIAL

## 2025-04-25 VITALS — DIASTOLIC BLOOD PRESSURE: 60 MMHG | WEIGHT: 162.8 LBS | SYSTOLIC BLOOD PRESSURE: 93 MMHG | BODY MASS INDEX: 31.79 KG/M2

## 2025-04-25 DIAGNOSIS — O36.5930 POOR FETAL GROWTH AFFECTING MANAGEMENT OF MOTHER IN THIRD TRIMESTER, SINGLE OR UNSPECIFIED FETUS (HHS-HCC): ICD-10-CM

## 2025-04-25 DIAGNOSIS — B96.89 BV (BACTERIAL VAGINOSIS): ICD-10-CM

## 2025-04-25 DIAGNOSIS — O34.219 HISTORY OF CESAREAN DELIVERY, CURRENTLY PREGNANT (HHS-HCC): ICD-10-CM

## 2025-04-25 DIAGNOSIS — Z3A.35 35 WEEKS GESTATION OF PREGNANCY (HHS-HCC): ICD-10-CM

## 2025-04-25 DIAGNOSIS — O20.0 THREATENED MISCARRIAGE (HHS-HCC): ICD-10-CM

## 2025-04-25 DIAGNOSIS — N76.0 BV (BACTERIAL VAGINOSIS): ICD-10-CM

## 2025-04-25 DIAGNOSIS — N89.8 VAGINAL DISCHARGE: Primary | ICD-10-CM

## 2025-04-25 PROBLEM — O02.0 ANEMBRYONIC PREGNANCY (HHS-HCC): Status: RESOLVED | Noted: 2024-09-30 | Resolved: 2025-04-25

## 2025-04-25 RX ORDER — METRONIDAZOLE 500 MG/1
500 TABLET ORAL 2 TIMES DAILY
Qty: 14 TABLET | Refills: 0 | Status: SHIPPED | OUTPATIENT
Start: 2025-04-25 | End: 2025-05-02

## 2025-04-25 ASSESSMENT — ENCOUNTER SYMPTOMS
LOSS OF SENSATION IN FEET: 0
OCCASIONAL FEELINGS OF UNSTEADINESS: 0
DEPRESSION: 0

## 2025-04-25 NOTE — PROGRESS NOTES
Argelia Valentine is a 25 y.o.  at 35w6d GA here for OB visit.      Subjective     No acute complaints.  Denies vaginal bleeding, leakage of fluid, painful regular uterine contractions, decreased fetal movement.     OB History    Para Term  AB Living   6 3 2 1 2 3   SAB IAB Ectopic Multiple Live Births   1 1   3      # Outcome Date GA Lbr Jonn/2nd Weight Sex Type Anes PTL Lv   6 Current            5 SAB      Complete      4 Term 2018    M CS-LTranv      3 Term     M CS-LTranv      2  12/23/15 32w0d   F CS-LTranv   BETZY      Complications: Severe pre-eclampsia   1 IAB 12     D&E Gen         Objective   Physical Exam  Weight: 73.8 kg (162 lb 12.8 oz)  Expected Total Weight Gain: 5 kg (11 lb)-9 kg (19 lb)   Pregravid BMI: 31.83  BP: 93/60     --  *   --                --  Physical Exam  Constitutional:       General: She is not in acute distress.     Appearance: She is not ill-appearing, toxic-appearing or diaphoretic.   Genitourinary:      Vaginal discharge (Malodorous) present.   Pulmonary:      Effort: Pulmonary effort is normal.   Neurological:      Mental Status: She is alert.   Psychiatric:         Mood and Affect: Mood normal.   Vitals reviewed. Exam conducted with a chaperone present.          ASSESSMENT & PLAN    Argelia Valentine is a 25 y.o.  at 35w6d here for the following concerns we addressed today:    Problem List Items Addressed This Visit       35 weeks gestation of pregnancy ([facility])    Overview   Desired provider in labor: [] CNM  [] Physician  [x] Blood Products: [x] Yes, accepts [] No, needs counseling  [x] Initial BMI: Could not be calculated   [x] Prenatal Labs:   [x] Cervical Cancer Screening up to date  [x] Rh status: b+  [x] Genetic Screening:  Ordered today  [x] NT US: (11-13 wks)  [x] Baby ASA (if indicated):  [] Pregnancy dated by:     [x] Anatomy US: (19-20 wks) WNL  [x] Federal Sterilization consent signed (if indicated): declines  [x] 1hr GCT at  24-28wks: WNL  [x] Rhogam (if indicated): NA  [x] Fetal Surveillance (if indicated): weeekly NST due to cHTN no meds  [x] Tdap (27-32 wks, may be given up to 36 wks if initial window missed):   [x] RSV (32-36 wks) (Sept. to end of ):   [x] Flu Vaccine: declines    [x] Breastfeeding: Breast and formula  [] Postpartum Birth control method: Undecided  [x] GBS at 36 - 37 wks:  [x] 39 weeks discussion of IOL vs. Expectant management: N/A  [x] Mode of delivery ( anticipated ): RCS at 38 weeks           Poor fetal growth affecting management of mother in third trimester ([facility])    Relevant Orders    Fetal nonstress test (Completed)    History of  delivery, currently pregnant ([facility])    Overview   History of  x 3, first was at 32 weeks for severe preeclmapsia  Will need repeat  delivery, scheduled with Dr. Navarro on             Other Visit Diagnoses         Vaginal discharge    -  Primary    Relevant Orders    Trichomonas vaginalis, Amplified    C. trachomatis / N. gonorrhoeae, Amplified, Urogenital    Vaginitis Gram Stain For Bacterial Vaginosis + Yeast      BV (bacterial vaginosis)        Relevant Medications    metroNIDAZOLE (Flagyl) 500 mg tablet              MADDI.  NST reactive.  Patient interested in having more than 1 person or with her for her delivery.  Will see if this is possible.  Patient complaining of persistent vaginitis symptoms.  Speculum exam performed consistent with BV will treat empirically follow-up swabs.    Follow up in 1 weeks      Delon Long MD

## 2025-04-25 NOTE — PROCEDURES
Trentongagandeep GAGANDEEP Valentine, a  at 35w6d with an RAHEL of 2025, by Last Menstrual Period, was seen at [facility] for a nonstress test.    Non-Stress Test   Baseline Fetal Heart Rate for Non-Stress Test: 135 BPM  Variability in Waveform for Non-Stress Test: Moderate  Accelerations in Non-Stress Test: Yes  Decelerations in Non-Stress Test: None  Contractions in Non-Stress Test: Not present  Interpretation of Non-Stress Test   Interpretation of Non-Stress Test: Reactive

## 2025-04-25 NOTE — LETTER
[facility] referral for patient Yoselyn Valentine.    [email or web address]

## 2025-04-26 LAB
BV SCORE VAG QL: ABNORMAL
C TRACH RRNA SPEC QL NAA+PROBE: NOT DETECTED
N GONORRHOEA RRNA SPEC QL NAA+PROBE: NOT DETECTED
QUEST GC CT AMPLIFIED (ALWAYS MESSAGE): NORMAL
T VAGINALIS RRNA SPEC QL NAA+PROBE: NOT DETECTED

## 2025-04-28 ENCOUNTER — TELEPHONE (OUTPATIENT)
Dept: OBSTETRICS AND GYNECOLOGY | Facility: CLINIC | Age: 25
End: 2025-04-28
Payer: COMMERCIAL

## 2025-04-28 NOTE — TELEPHONE ENCOUNTER
Nurse called patient's listed telephone number [phone] but received the voicemail and asked for a call back at the office.    PERLA Nieves

## 2025-05-02 ENCOUNTER — APPOINTMENT (OUTPATIENT)
Dept: OBSTETRICS AND GYNECOLOGY | Facility: CLINIC | Age: 25
End: 2025-05-02
Payer: COMMERCIAL

## 2025-05-02 ENCOUNTER — HOSPITAL ENCOUNTER (OUTPATIENT)
Dept: RADIOLOGY | Facility: CLINIC | Age: 25
Discharge: HOME | End: 2025-05-02
Payer: COMMERCIAL

## 2025-05-02 VITALS — SYSTOLIC BLOOD PRESSURE: 105 MMHG | WEIGHT: 161 LBS | BODY MASS INDEX: 31.44 KG/M2 | DIASTOLIC BLOOD PRESSURE: 68 MMHG

## 2025-05-02 DIAGNOSIS — O10.919 CHRONIC HYPERTENSION IN PREGNANCY (HHS-HCC): Primary | ICD-10-CM

## 2025-05-02 DIAGNOSIS — Z3A.36 36 WEEKS GESTATION OF PREGNANCY (HHS-HCC): ICD-10-CM

## 2025-05-02 DIAGNOSIS — Z3A.08 8 WEEKS GESTATION OF PREGNANCY (HHS-HCC): ICD-10-CM

## 2025-05-02 PROCEDURE — 76815 OB US LIMITED FETUS(S): CPT

## 2025-05-02 PROCEDURE — 76820 UMBILICAL ARTERY ECHO: CPT

## 2025-05-02 NOTE — PROGRESS NOTES
Argelia Valentine is a 25 y.o.  at 36w6d GA here for OB visit.      Subjective     No acute complaints.  Denies vaginal bleeding, leakage of fluid, painful regular uterine contractions, decreased fetal movement.     OB History    Para Term  AB Living   6 3 2 1 2 3   SAB IAB Ectopic Multiple Live Births   1 1   3      # Outcome Date GA Lbr Jonn/2nd Weight Sex Type Anes PTL Lv   6 Current            5 SAB      Complete      4 Term 2018    M CS-LTranv      3 Term     M CS-LTranv      2  12/23/15 32w0d   F CS-LTranv   BETZY      Complications: Severe pre-eclampsia   1 IAB 12     D&E Gen         Objective   Physical Exam  Weight: 73 kg (161 lb)  Expected Total Weight Gain: 5 kg (11 lb)-9 kg (19 lb)   Pregravid BMI: 31.83  BP: 105/68     --     --  Fetal Heart Rate: NST             --  Physical Exam  Constitutional:       General: She is not in acute distress.     Appearance: She is not ill-appearing, toxic-appearing or diaphoretic.   Pulmonary:      Effort: Pulmonary effort is normal.   Neurological:      Mental Status: She is alert.   Psychiatric:         Mood and Affect: Mood normal.   Vitals reviewed.          ASSESSMENT & PLAN    Argelia Valentine is a 25 y.o.  at 36w6d here for the following concerns we addressed today:    Problem List Items Addressed This Visit       36 weeks gestation of pregnancy ([facility])    Overview   Desired provider in labor: [] CNM  [] Physician  [x] Blood Products: [x] Yes, accepts [] No, needs counseling  [x] Initial BMI: Could not be calculated   [x] Prenatal Labs:   [x] Cervical Cancer Screening up to date  [x] Rh status: b+  [x] Genetic Screening:  Ordered today  [x] NT US: (11-13 wks)  [x] Baby ASA (if indicated):  [] Pregnancy dated by:     [x] Anatomy US: (19-20 wks) WNL  [x] Federal Sterilization consent signed (if indicated): declines  [x] 1hr GCT at 24-28wks: WNL  [x] Rhogam (if indicated): NA  [x] Fetal Surveillance (if indicated): dewayne ROSALES  due to cHTN no meds  [x] Tdap (27-32 wks, may be given up to 36 wks if initial window missed):   [x] RSV (32-36 wks) (Sept. to end of ):   [x] Flu Vaccine: declines    [x] Breastfeeding: Breast and formula  [] Postpartum Birth control method: Undecided  [x] GBS at 36 - 37 wks:  [x] 39 weeks discussion of IOL vs. Expectant management: N/A  [x] Mode of delivery ( anticipated ): RCS at 38 weeks           Chronic hypertension in pregnancy ([facility]) - Primary    Overview   Not on meds  Begin   Stable blood pressure no treatment necessary         Relevant Orders    Fetal nonstress test         Requesting to move up  due to aches and pains in pregnancy.  Will attempt again to get patient a belly band.  2 today reactive.    Follow up in 1 weeks      Delon Long MD

## 2025-05-02 NOTE — PROCEDURES
Argelia Valentine, a  at 36w6d with an RAHEL of 2025, by Last Menstrual Period, was seen at [facility] for a nonstress test.    Non-Stress Test   Baseline Fetal Heart Rate for Non-Stress Test: 130 BPM  Variability in Waveform for Non-Stress Test: Moderate  Accelerations in Non-Stress Test: Yes  Decelerations in Non-Stress Test: None  Contractions in Non-Stress Test: Irregular  Interpretation of Non-Stress Test   Interpretation of Non-Stress Test: Reactive

## 2025-05-09 ENCOUNTER — APPOINTMENT (OUTPATIENT)
Dept: RADIOLOGY | Facility: CLINIC | Age: 25
End: 2025-05-09
Payer: COMMERCIAL

## 2025-05-13 ENCOUNTER — APPOINTMENT (OUTPATIENT)
Dept: OBSTETRICS AND GYNECOLOGY | Facility: CLINIC | Age: 25
End: 2025-05-13
Payer: COMMERCIAL

## 2025-05-16 ENCOUNTER — ANESTHESIA (OUTPATIENT)
Dept: OBSTETRICS AND GYNECOLOGY | Facility: HOSPITAL | Age: 25
End: 2025-05-16
Payer: COMMERCIAL

## 2025-05-16 ENCOUNTER — ANESTHESIA EVENT (OUTPATIENT)
Dept: OBSTETRICS AND GYNECOLOGY | Facility: HOSPITAL | Age: 25
End: 2025-05-16
Payer: COMMERCIAL

## 2025-05-16 ENCOUNTER — HOSPITAL ENCOUNTER (INPATIENT)
Facility: HOSPITAL | Age: 25
End: 2025-05-16
Attending: OBSTETRICS & GYNECOLOGY | Admitting: OBSTETRICS & GYNECOLOGY
Payer: COMMERCIAL

## 2025-05-16 DIAGNOSIS — Z98.891 S/P C-SECTION: Primary | ICD-10-CM

## 2025-05-16 PROBLEM — Z3A.36 36 WEEKS GESTATION OF PREGNANCY (HHS-HCC): Status: RESOLVED | Noted: 2024-10-08 | Resolved: 2025-05-16

## 2025-05-16 LAB
25(OH)D3+25(OH)D2 SERPL-MCNC: 14 NG/ML (ref 30–100)
ABO GROUP (TYPE) IN BLOOD: NORMAL
ALBUMIN SERPL-MCNC: 3.5 G/DL (ref 3.6–5.1)
ALP SERPL-CCNC: 140 U/L (ref 31–125)
ALT SERPL-CCNC: 8 U/L (ref 6–29)
ANION GAP SERPL CALC-SCNC: 11 MMOL/L (ref 10–20)
ANION GAP SERPL CALCULATED.4IONS-SCNC: 9 MMOL/L (CALC) (ref 7–17)
ANTIBODY SCREEN: NORMAL
AST SERPL-CCNC: 13 U/L (ref 10–30)
BASOPHILS # BLD AUTO: 0.01 X10*3/UL (ref 0–0.1)
BASOPHILS NFR BLD AUTO: 0.2 %
BILIRUB SERPL-MCNC: 0.4 MG/DL (ref 0.2–1.2)
BLOOD EXPIRATION DATE: NORMAL
BLOOD EXPIRATION DATE: NORMAL
BUN SERPL-MCNC: 4 MG/DL (ref 7–25)
BUN SERPL-MCNC: 5 MG/DL (ref 6–23)
BURR CELLS BLD QL SMEAR: NORMAL
CALCIUM SERPL-MCNC: 8.3 MG/DL (ref 8.6–10.3)
CALCIUM SERPL-MCNC: 8.5 MG/DL (ref 8.6–10.2)
CHLORIDE SERPL-SCNC: 106 MMOL/L (ref 98–110)
CHLORIDE SERPL-SCNC: 107 MMOL/L (ref 98–107)
CO2 SERPL-SCNC: 21 MMOL/L (ref 20–32)
CO2 SERPL-SCNC: 22 MMOL/L (ref 21–32)
CREAT SERPL-MCNC: 0.43 MG/DL (ref 0.5–1.05)
CREAT SERPL-MCNC: 0.49 MG/DL (ref 0.5–0.96)
DACRYOCYTES BLD QL SMEAR: NORMAL
DISPENSE STATUS: NORMAL
DISPENSE STATUS: NORMAL
EGFRCR SERPLBLD CKD-EPI 2021: 132 ML/MIN/1.73M2
EGFRCR SERPLBLD CKD-EPI 2021: >90 ML/MIN/1.73M*2
EOSINOPHIL # BLD AUTO: 0.08 X10*3/UL (ref 0–0.7)
EOSINOPHIL NFR BLD AUTO: 1.7 %
ERYTHROCYTE [DISTWIDTH] IN BLOOD BY AUTOMATED COUNT: ABNORMAL %
EST. AVERAGE GLUCOSE BLD GHB EST-MCNC: 88 MG/DL
EST. AVERAGE GLUCOSE BLD GHB EST-SCNC: 4.9 MMOL/L
GLUCOSE SERPL-MCNC: 74 MG/DL (ref 65–99)
GLUCOSE SERPL-MCNC: 76 MG/DL (ref 74–99)
HBA1C MFR BLD: 4.7 %
HBV SURFACE AG SERPL QL IA: NORMAL
HCT VFR BLD AUTO: 32.4 % (ref 36–46)
HGB BLD-MCNC: 10.5 G/DL (ref 12–16)
HIV 1+2 AB+HIV1 P24 AG SERPL QL IA: NORMAL
HYPOCHROMIA BLD QL SMEAR: NORMAL
IMM GRANULOCYTES # BLD AUTO: 0.02 X10*3/UL (ref 0–0.7)
IMM GRANULOCYTES NFR BLD AUTO: 0.4 % (ref 0–0.9)
LYMPHOCYTES # BLD AUTO: 1.88 X10*3/UL (ref 1.2–4.8)
LYMPHOCYTES NFR BLD AUTO: 38.9 %
MCH RBC QN AUTO: 24.3 PG (ref 26–34)
MCHC RBC AUTO-ENTMCNC: 32.4 G/DL (ref 32–36)
MCV RBC AUTO: 75 FL (ref 80–100)
MONOCYTES # BLD AUTO: 0.29 X10*3/UL (ref 0.1–1)
MONOCYTES NFR BLD AUTO: 6 %
NEUTROPHILS # BLD AUTO: 2.55 X10*3/UL (ref 1.2–7.7)
NEUTROPHILS NFR BLD AUTO: 52.8 %
NRBC BLD-RTO: 0 /100 WBCS (ref 0–0)
OVALOCYTES BLD QL SMEAR: NORMAL
PLATELET # BLD AUTO: ABNORMAL 10*3/UL
POLYCHROMASIA BLD QL SMEAR: NORMAL
POTASSIUM SERPL-SCNC: 3.7 MMOL/L (ref 3.5–5.3)
POTASSIUM SERPL-SCNC: 4 MMOL/L (ref 3.5–5.3)
PRODUCT BLOOD TYPE: 7300
PRODUCT BLOOD TYPE: 7300
PRODUCT CODE: NORMAL
PRODUCT CODE: NORMAL
PROT SERPL-MCNC: 6.6 G/DL (ref 6.1–8.1)
RBC # BLD AUTO: 4.32 X10*6/UL (ref 4–5.2)
RBC MORPH BLD: NORMAL
RH FACTOR (ANTIGEN D): NORMAL
RUBV IGG SERPL IA-ACNC: 3.35 INDEX
SODIUM SERPL-SCNC: 136 MMOL/L (ref 135–146)
SODIUM SERPL-SCNC: 136 MMOL/L (ref 136–145)
T PALLIDUM AB SER QL IA: NEGATIVE
UNIT ABO: NORMAL
UNIT ABO: NORMAL
UNIT NUMBER: NORMAL
UNIT NUMBER: NORMAL
UNIT RH: NORMAL
UNIT RH: NORMAL
UNIT VOLUME: 350
UNIT VOLUME: 350
WBC # BLD AUTO: 4.8 X10*3/UL (ref 4.4–11.3)
XM INTEP: NORMAL
XM INTEP: NORMAL

## 2025-05-16 PROCEDURE — 59514 CESAREAN DELIVERY ONLY: CPT | Performed by: OBSTETRICS & GYNECOLOGY

## 2025-05-16 PROCEDURE — 86923 COMPATIBILITY TEST ELECTRIC: CPT

## 2025-05-16 PROCEDURE — 3700000014 HC AN EPIDURAL BLOCK CHARGE: Performed by: OBSTETRICS & GYNECOLOGY

## 2025-05-16 PROCEDURE — 2500000004 HC RX 250 GENERAL PHARMACY W/ HCPCS (ALT 636 FOR OP/ED): Mod: JW | Performed by: NURSE ANESTHETIST, CERTIFIED REGISTERED

## 2025-05-16 PROCEDURE — 2500000004 HC RX 250 GENERAL PHARMACY W/ HCPCS (ALT 636 FOR OP/ED): Mod: JZ | Performed by: OBSTETRICS & GYNECOLOGY

## 2025-05-16 PROCEDURE — 2500000005 HC RX 250 GENERAL PHARMACY W/O HCPCS: Performed by: OBSTETRICS & GYNECOLOGY

## 2025-05-16 PROCEDURE — 2500000001 HC RX 250 WO HCPCS SELF ADMINISTERED DRUGS (ALT 637 FOR MEDICARE OP): Performed by: NURSE ANESTHETIST, CERTIFIED REGISTERED

## 2025-05-16 PROCEDURE — 88307 TISSUE EXAM BY PATHOLOGIST: CPT | Mod: TC,AHULAB | Performed by: OBSTETRICS & GYNECOLOGY

## 2025-05-16 PROCEDURE — 2500000004 HC RX 250 GENERAL PHARMACY W/ HCPCS (ALT 636 FOR OP/ED): Mod: JZ | Performed by: STUDENT IN AN ORGANIZED HEALTH CARE EDUCATION/TRAINING PROGRAM

## 2025-05-16 PROCEDURE — 85025 COMPLETE CBC W/AUTO DIFF WBC: CPT | Performed by: OBSTETRICS & GYNECOLOGY

## 2025-05-16 PROCEDURE — 2500000001 HC RX 250 WO HCPCS SELF ADMINISTERED DRUGS (ALT 637 FOR MEDICARE OP): Performed by: OBSTETRICS & GYNECOLOGY

## 2025-05-16 PROCEDURE — 2720000007 HC OR 272 NO HCPCS: Performed by: OBSTETRICS & GYNECOLOGY

## 2025-05-16 PROCEDURE — 1100000001 HC PRIVATE ROOM DAILY

## 2025-05-16 PROCEDURE — 36415 COLL VENOUS BLD VENIPUNCTURE: CPT | Performed by: OBSTETRICS & GYNECOLOGY

## 2025-05-16 PROCEDURE — A6213 FOAM DRG >16<=48 SQ IN W/BDR: HCPCS | Performed by: OBSTETRICS & GYNECOLOGY

## 2025-05-16 PROCEDURE — 80048 BASIC METABOLIC PNL TOTAL CA: CPT | Performed by: OBSTETRICS & GYNECOLOGY

## 2025-05-16 PROCEDURE — 51702 INSERT TEMP BLADDER CATH: CPT

## 2025-05-16 PROCEDURE — 2500000001 HC RX 250 WO HCPCS SELF ADMINISTERED DRUGS (ALT 637 FOR MEDICARE OP): Performed by: STUDENT IN AN ORGANIZED HEALTH CARE EDUCATION/TRAINING PROGRAM

## 2025-05-16 PROCEDURE — 86901 BLOOD TYPING SEROLOGIC RH(D): CPT | Performed by: OBSTETRICS & GYNECOLOGY

## 2025-05-16 PROCEDURE — 2500000004 HC RX 250 GENERAL PHARMACY W/ HCPCS (ALT 636 FOR OP/ED): Mod: JZ | Performed by: NURSE ANESTHETIST, CERTIFIED REGISTERED

## 2025-05-16 RX ORDER — IBUPROFEN 600 MG/1
600 TABLET, FILM COATED ORAL EVERY 6 HOURS
Status: DISCONTINUED | OUTPATIENT
Start: 2025-05-17 | End: 2025-05-19 | Stop reason: HOSPADM

## 2025-05-16 RX ORDER — FAMOTIDINE 10 MG/ML
20 INJECTION, SOLUTION INTRAVENOUS ONCE
Status: COMPLETED | OUTPATIENT
Start: 2025-05-16 | End: 2025-05-16

## 2025-05-16 RX ORDER — SODIUM CITRATE AND CITRIC ACID MONOHYDRATE 334; 500 MG/5ML; MG/5ML
30 SOLUTION ORAL ONCE
Status: COMPLETED | OUTPATIENT
Start: 2025-05-16 | End: 2025-05-16

## 2025-05-16 RX ORDER — ONDANSETRON HYDROCHLORIDE 2 MG/ML
INJECTION, SOLUTION INTRAVENOUS AS NEEDED
Status: DISCONTINUED | OUTPATIENT
Start: 2025-05-16 | End: 2025-05-16

## 2025-05-16 RX ORDER — HYDRALAZINE HYDROCHLORIDE 20 MG/ML
5 INJECTION INTRAMUSCULAR; INTRAVENOUS ONCE AS NEEDED
Status: DISCONTINUED | OUTPATIENT
Start: 2025-05-16 | End: 2025-05-19 | Stop reason: HOSPADM

## 2025-05-16 RX ORDER — TRANEXAMIC ACID 1 G/10ML
1000 INJECTION, SOLUTION INTRAVENOUS ONCE
Status: ACTIVE | OUTPATIENT
Start: 2025-05-16 | End: 2025-05-19

## 2025-05-16 RX ORDER — LABETALOL HYDROCHLORIDE 5 MG/ML
20 INJECTION, SOLUTION INTRAVENOUS ONCE AS NEEDED
Status: DISCONTINUED | OUTPATIENT
Start: 2025-05-16 | End: 2025-05-19 | Stop reason: HOSPADM

## 2025-05-16 RX ORDER — ACETAMINOPHEN 120 MG/1
SUPPOSITORY RECTAL AS NEEDED
Status: DISCONTINUED | OUTPATIENT
Start: 2025-05-16 | End: 2025-05-16

## 2025-05-16 RX ORDER — LIDOCAINE 560 MG/1
1 PATCH PERCUTANEOUS; TOPICAL; TRANSDERMAL
Status: DISCONTINUED | OUTPATIENT
Start: 2025-05-16 | End: 2025-05-19 | Stop reason: HOSPADM

## 2025-05-16 RX ORDER — MISOPROSTOL 200 UG/1
800 TABLET ORAL ONCE AS NEEDED
Status: DISCONTINUED | OUTPATIENT
Start: 2025-05-16 | End: 2025-05-19 | Stop reason: HOSPADM

## 2025-05-16 RX ORDER — ONDANSETRON HYDROCHLORIDE 2 MG/ML
4 INJECTION, SOLUTION INTRAVENOUS EVERY 6 HOURS PRN
Status: DISCONTINUED | OUTPATIENT
Start: 2025-05-16 | End: 2025-05-19 | Stop reason: HOSPADM

## 2025-05-16 RX ORDER — HYDROMORPHONE HYDROCHLORIDE 1 MG/ML
0.2 INJECTION, SOLUTION INTRAMUSCULAR; INTRAVENOUS; SUBCUTANEOUS EVERY 5 MIN PRN
Status: DISCONTINUED | OUTPATIENT
Start: 2025-05-16 | End: 2025-05-19 | Stop reason: HOSPADM

## 2025-05-16 RX ORDER — FAMOTIDINE 10 MG/ML
INJECTION, SOLUTION INTRAVENOUS AS NEEDED
Status: DISCONTINUED | OUTPATIENT
Start: 2025-05-16 | End: 2025-05-16

## 2025-05-16 RX ORDER — MORPHINE SULFATE 0.5 MG/ML
INJECTION, SOLUTION EPIDURAL; INTRATHECAL; INTRAVENOUS AS NEEDED
Status: DISCONTINUED | OUTPATIENT
Start: 2025-05-16 | End: 2025-05-16

## 2025-05-16 RX ORDER — POLYETHYLENE GLYCOL 3350 17 G/17G
17 POWDER, FOR SOLUTION ORAL 2 TIMES DAILY PRN
Status: DISCONTINUED | OUTPATIENT
Start: 2025-05-16 | End: 2025-05-19 | Stop reason: HOSPADM

## 2025-05-16 RX ORDER — NALOXONE HYDROCHLORIDE 0.4 MG/ML
0.1 INJECTION, SOLUTION INTRAMUSCULAR; INTRAVENOUS; SUBCUTANEOUS EVERY 5 MIN PRN
Status: DISCONTINUED | OUTPATIENT
Start: 2025-05-16 | End: 2025-05-19 | Stop reason: HOSPADM

## 2025-05-16 RX ORDER — OXYCODONE HYDROCHLORIDE 5 MG/1
10 TABLET ORAL EVERY 4 HOURS PRN
Status: DISCONTINUED | OUTPATIENT
Start: 2025-05-17 | End: 2025-05-19 | Stop reason: HOSPADM

## 2025-05-16 RX ORDER — OXYCODONE HYDROCHLORIDE 5 MG/1
5 TABLET ORAL EVERY 4 HOURS PRN
Status: DISCONTINUED | OUTPATIENT
Start: 2025-05-17 | End: 2025-05-19 | Stop reason: HOSPADM

## 2025-05-16 RX ORDER — ACETAMINOPHEN 325 MG/1
975 TABLET ORAL EVERY 6 HOURS
Status: DISCONTINUED | OUTPATIENT
Start: 2025-05-16 | End: 2025-05-19 | Stop reason: HOSPADM

## 2025-05-16 RX ORDER — METHYLERGONOVINE MALEATE 0.2 MG/ML
0.2 INJECTION INTRAVENOUS ONCE AS NEEDED
Status: DISCONTINUED | OUTPATIENT
Start: 2025-05-16 | End: 2025-05-19 | Stop reason: HOSPADM

## 2025-05-16 RX ORDER — OXYTOCIN 10 [USP'U]/ML
10 INJECTION, SOLUTION INTRAMUSCULAR; INTRAVENOUS ONCE AS NEEDED
Status: DISCONTINUED | OUTPATIENT
Start: 2025-05-16 | End: 2025-05-19 | Stop reason: HOSPADM

## 2025-05-16 RX ORDER — ONDANSETRON 4 MG/1
4 TABLET, FILM COATED ORAL EVERY 6 HOURS PRN
Status: DISCONTINUED | OUTPATIENT
Start: 2025-05-16 | End: 2025-05-19 | Stop reason: HOSPADM

## 2025-05-16 RX ORDER — OXYTOCIN/0.9 % SODIUM CHLORIDE 30/500 ML
PLASTIC BAG, INJECTION (ML) INTRAVENOUS CONTINUOUS PRN
Status: DISCONTINUED | OUTPATIENT
Start: 2025-05-16 | End: 2025-05-16

## 2025-05-16 RX ORDER — TRANEXAMIC ACID 1 G/10ML
1000 INJECTION, SOLUTION INTRAVENOUS ONCE AS NEEDED
Status: DISCONTINUED | OUTPATIENT
Start: 2025-05-16 | End: 2025-05-16

## 2025-05-16 RX ORDER — KETOROLAC TROMETHAMINE 30 MG/ML
INJECTION, SOLUTION INTRAMUSCULAR; INTRAVENOUS AS NEEDED
Status: DISCONTINUED | OUTPATIENT
Start: 2025-05-16 | End: 2025-05-16

## 2025-05-16 RX ORDER — LOPERAMIDE HYDROCHLORIDE 2 MG/1
4 CAPSULE ORAL EVERY 2 HOUR PRN
Status: DISCONTINUED | OUTPATIENT
Start: 2025-05-16 | End: 2025-05-19 | Stop reason: HOSPADM

## 2025-05-16 RX ORDER — CEFAZOLIN 1 G/1
INJECTION, POWDER, FOR SOLUTION INTRAVENOUS AS NEEDED
Status: DISCONTINUED | OUTPATIENT
Start: 2025-05-16 | End: 2025-05-16

## 2025-05-16 RX ORDER — ADHESIVE BANDAGE
10 BANDAGE TOPICAL
Status: DISCONTINUED | OUTPATIENT
Start: 2025-05-16 | End: 2025-05-19 | Stop reason: HOSPADM

## 2025-05-16 RX ORDER — OXYTOCIN/0.9 % SODIUM CHLORIDE 30/500 ML
60 PLASTIC BAG, INJECTION (ML) INTRAVENOUS ONCE AS NEEDED
Status: DISCONTINUED | OUTPATIENT
Start: 2025-05-16 | End: 2025-05-19 | Stop reason: HOSPADM

## 2025-05-16 RX ORDER — DIPHENHYDRAMINE HCL 25 MG
25 CAPSULE ORAL EVERY 4 HOURS PRN
Status: DISCONTINUED | OUTPATIENT
Start: 2025-05-16 | End: 2025-05-19 | Stop reason: HOSPADM

## 2025-05-16 RX ORDER — SIMETHICONE 80 MG
80 TABLET,CHEWABLE ORAL 4 TIMES DAILY PRN
Status: DISCONTINUED | OUTPATIENT
Start: 2025-05-16 | End: 2025-05-19 | Stop reason: HOSPADM

## 2025-05-16 RX ORDER — CARBOPROST TROMETHAMINE 250 UG/ML
250 INJECTION, SOLUTION INTRAMUSCULAR ONCE AS NEEDED
Status: DISCONTINUED | OUTPATIENT
Start: 2025-05-16 | End: 2025-05-19 | Stop reason: HOSPADM

## 2025-05-16 RX ORDER — KETOROLAC TROMETHAMINE 30 MG/ML
30 INJECTION, SOLUTION INTRAMUSCULAR; INTRAVENOUS EVERY 6 HOURS
Status: COMPLETED | OUTPATIENT
Start: 2025-05-16 | End: 2025-05-17

## 2025-05-16 RX ORDER — DIPHENHYDRAMINE HYDROCHLORIDE 50 MG/ML
25 INJECTION, SOLUTION INTRAMUSCULAR; INTRAVENOUS EVERY 4 HOURS PRN
Status: DISCONTINUED | OUTPATIENT
Start: 2025-05-16 | End: 2025-05-19 | Stop reason: HOSPADM

## 2025-05-16 RX ORDER — MIDAZOLAM HYDROCHLORIDE 1 MG/ML
INJECTION, SOLUTION INTRAMUSCULAR; INTRAVENOUS AS NEEDED
Status: DISCONTINUED | OUTPATIENT
Start: 2025-05-16 | End: 2025-05-16

## 2025-05-16 RX ORDER — BUPIVACAINE HYDROCHLORIDE 7.5 MG/ML
INJECTION INTRAVENOUS AS NEEDED
Status: DISCONTINUED | OUTPATIENT
Start: 2025-05-16 | End: 2025-05-16

## 2025-05-16 RX ADMIN — SODIUM CHLORIDE, POTASSIUM CHLORIDE, SODIUM LACTATE AND CALCIUM CHLORIDE 1000 ML: 600; 310; 30; 20 INJECTION, SOLUTION INTRAVENOUS at 08:41

## 2025-05-16 RX ADMIN — SODIUM CHLORIDE, POTASSIUM CHLORIDE, SODIUM LACTATE AND CALCIUM CHLORIDE: 600; 310; 30; 20 INJECTION, SOLUTION INTRAVENOUS at 09:21

## 2025-05-16 RX ADMIN — KETOROLAC TROMETHAMINE 30 MG: 30 INJECTION, SOLUTION INTRAMUSCULAR; INTRAVENOUS at 16:06

## 2025-05-16 RX ADMIN — ACETAMINOPHEN 975 MG: 325 TABLET, FILM COATED ORAL at 22:14

## 2025-05-16 RX ADMIN — KETOROLAC TROMETHAMINE 30 MG: 30 INJECTION, SOLUTION INTRAMUSCULAR; INTRAVENOUS at 22:14

## 2025-05-16 RX ADMIN — FAMOTIDINE 20 MG: 10 INJECTION INTRAVENOUS at 09:35

## 2025-05-16 RX ADMIN — MORPHINE SULFATE 4.85 MG: 0.5 INJECTION, SOLUTION EPIDURAL; INTRATHECAL; INTRAVENOUS at 09:40

## 2025-05-16 RX ADMIN — SODIUM CHLORIDE, POTASSIUM CHLORIDE, SODIUM LACTATE AND CALCIUM CHLORIDE: 600; 310; 30; 20 INJECTION, SOLUTION INTRAVENOUS at 08:44

## 2025-05-16 RX ADMIN — Medication 600 MILLI-UNITS/MIN: at 09:24

## 2025-05-16 RX ADMIN — ONDANSETRON 4 MG: 2 INJECTION INTRAMUSCULAR; INTRAVENOUS at 09:15

## 2025-05-16 RX ADMIN — SODIUM CITRATE AND CITRIC ACID MONOHYDRATE 30 ML: 500; 334 SOLUTION ORAL at 07:52

## 2025-05-16 RX ADMIN — MIDAZOLAM HYDROCHLORIDE 2 MG: 1 INJECTION, SOLUTION INTRAMUSCULAR; INTRAVENOUS at 09:50

## 2025-05-16 RX ADMIN — KETOROLAC TROMETHAMINE 30 MG: 30 INJECTION, SOLUTION INTRAMUSCULAR; INTRAVENOUS at 09:58

## 2025-05-16 RX ADMIN — CEFAZOLIN 2 G: 1 INJECTION, POWDER, FOR SOLUTION INTRAMUSCULAR; INTRAVENOUS at 09:01

## 2025-05-16 RX ADMIN — ONDANSETRON 4 MG: 2 INJECTION INTRAMUSCULAR; INTRAVENOUS at 12:14

## 2025-05-16 RX ADMIN — ACETAMINOPHEN 975 MG: 325 TABLET, FILM COATED ORAL at 16:07

## 2025-05-16 RX ADMIN — FAMOTIDINE 20 MG: 10 INJECTION INTRAVENOUS at 07:52

## 2025-05-16 RX ADMIN — BUPIVACAINE HYDROCHLORIDE IN DEXTROSE 1.5 ML: 7.5 INJECTION, SOLUTION SUBARACHNOID at 08:57

## 2025-05-16 RX ADMIN — TRANEXAMIC ACID 1000 MG: 1 INJECTION, SOLUTION INTRAVENOUS at 12:43

## 2025-05-16 RX ADMIN — ACETAMINOPHEN 650 MG: 120 SUPPOSITORY RECTAL at 10:14

## 2025-05-16 RX ADMIN — MORPHINE SULFATE 0.15 MG: 0.5 INJECTION, SOLUTION EPIDURAL; INTRATHECAL; INTRAVENOUS at 08:57

## 2025-05-16 SDOH — HEALTH STABILITY: MENTAL HEALTH: CURRENT SMOKER: 0

## 2025-05-16 SDOH — SOCIAL STABILITY: SOCIAL INSECURITY: VERBAL ABUSE: DENIES

## 2025-05-16 SDOH — ECONOMIC STABILITY: FOOD INSECURITY: WITHIN THE PAST 12 MONTHS, THE FOOD YOU BOUGHT JUST DIDN'T LAST AND YOU DIDN'T HAVE MONEY TO GET MORE.: NEVER TRUE

## 2025-05-16 SDOH — ECONOMIC STABILITY: HOUSING INSECURITY: DO YOU FEEL UNSAFE GOING BACK TO THE PLACE WHERE YOU ARE LIVING?: NO

## 2025-05-16 SDOH — SOCIAL STABILITY: SOCIAL INSECURITY
WITHIN THE LAST YEAR, HAVE YOU BEEN RAPED OR FORCED TO HAVE ANY KIND OF SEXUAL ACTIVITY BY YOUR PARTNER OR EX-PARTNER?: NO

## 2025-05-16 SDOH — HEALTH STABILITY: MENTAL HEALTH: HOW OFTEN DO YOU HAVE A DRINK CONTAINING ALCOHOL?: NEVER

## 2025-05-16 SDOH — ECONOMIC STABILITY: FOOD INSECURITY: WITHIN THE PAST 12 MONTHS, YOU WORRIED THAT YOUR FOOD WOULD RUN OUT BEFORE YOU GOT THE MONEY TO BUY MORE.: NEVER TRUE

## 2025-05-16 SDOH — SOCIAL STABILITY: SOCIAL INSECURITY
WITHIN THE LAST YEAR, HAVE YOU BEEN KICKED, HIT, SLAPPED, OR OTHERWISE PHYSICALLY HURT BY YOUR PARTNER OR EX-PARTNER?: NO

## 2025-05-16 SDOH — HEALTH STABILITY: MENTAL HEALTH: HOW MANY DRINKS CONTAINING ALCOHOL DO YOU HAVE ON A TYPICAL DAY WHEN YOU ARE DRINKING?: PATIENT DOES NOT DRINK

## 2025-05-16 SDOH — ECONOMIC STABILITY: TRANSPORTATION INSECURITY: IN THE PAST 12 MONTHS, HAS LACK OF TRANSPORTATION KEPT YOU FROM MEDICAL APPOINTMENTS OR FROM GETTING MEDICATIONS?: NO

## 2025-05-16 SDOH — HEALTH STABILITY: MENTAL HEALTH: HOW OFTEN DO YOU HAVE SIX OR MORE DRINKS ON ONE OCCASION?: NEVER

## 2025-05-16 SDOH — SOCIAL STABILITY: SOCIAL INSECURITY: WITHIN THE LAST YEAR, HAVE YOU BEEN HUMILIATED OR EMOTIONALLY ABUSED IN OTHER WAYS BY YOUR PARTNER OR EX-PARTNER?: NO

## 2025-05-16 SDOH — HEALTH STABILITY: MENTAL HEALTH: SUICIDAL BEHAVIOR (LIFETIME): NO

## 2025-05-16 SDOH — HEALTH STABILITY: MENTAL HEALTH: NON-SPECIFIC ACTIVE SUICIDAL THOUGHTS (PAST 1 MONTH): NO

## 2025-05-16 SDOH — SOCIAL STABILITY: SOCIAL INSECURITY: DOES ANYONE TRY TO KEEP YOU FROM HAVING/CONTACTING OTHER FRIENDS OR DOING THINGS OUTSIDE YOUR HOME?: NO

## 2025-05-16 SDOH — SOCIAL STABILITY: SOCIAL INSECURITY: WITHIN THE LAST YEAR, HAVE YOU BEEN AFRAID OF YOUR PARTNER OR EX-PARTNER?: NO

## 2025-05-16 SDOH — SOCIAL STABILITY: SOCIAL INSECURITY: PHYSICAL ABUSE: DENIES

## 2025-05-16 SDOH — HEALTH STABILITY: MENTAL HEALTH: WISH TO BE DEAD (PAST 1 MONTH): NO

## 2025-05-16 SDOH — SOCIAL STABILITY: SOCIAL INSECURITY: ARE THERE ANY APPARENT SIGNS OF INJURIES/BEHAVIORS THAT COULD BE RELATED TO ABUSE/NEGLECT?: NO

## 2025-05-16 SDOH — ECONOMIC STABILITY: FOOD INSECURITY: HOW HARD IS IT FOR YOU TO PAY FOR THE VERY BASICS LIKE FOOD, HOUSING, MEDICAL CARE, AND HEATING?: NOT HARD AT ALL

## 2025-05-16 SDOH — SOCIAL STABILITY: SOCIAL INSECURITY: HAS ANYONE EVER THREATENED TO HURT YOUR FAMILY OR YOUR PETS?: NO

## 2025-05-16 SDOH — HEALTH STABILITY: MENTAL HEALTH: HAVE YOU USED ANY PRESCRIPTION DRUGS OTHER THAN PRESCRIBED IN THE PAST 12 MONTHS?: NO

## 2025-05-16 SDOH — HEALTH STABILITY: MENTAL HEALTH: STRENGTHS (MUST CHOOSE TWO): SUPPORT FROM FAMILY;SUPPORT FROM FRIENDS

## 2025-05-16 SDOH — SOCIAL STABILITY: SOCIAL INSECURITY: ARE YOU OR HAVE YOU BEEN THREATENED OR ABUSED PHYSICALLY, EMOTIONALLY, OR SEXUALLY BY ANYONE?: NO

## 2025-05-16 SDOH — SOCIAL STABILITY: SOCIAL INSECURITY: DO YOU FEEL ANYONE HAS EXPLOITED OR TAKEN ADVANTAGE OF YOU FINANCIALLY OR OF YOUR PERSONAL PROPERTY?: NO

## 2025-05-16 SDOH — SOCIAL STABILITY: SOCIAL INSECURITY: HAVE YOU HAD THOUGHTS OF HARMING ANYONE ELSE?: NO

## 2025-05-16 SDOH — HEALTH STABILITY: MENTAL HEALTH

## 2025-05-16 SDOH — SOCIAL STABILITY: SOCIAL INSECURITY: ABUSE SCREEN: ADULT

## 2025-05-16 SDOH — SOCIAL STABILITY: SOCIAL INSECURITY: HAVE YOU HAD ANY THOUGHTS OF HARMING ANYONE ELSE?: NO

## 2025-05-16 ASSESSMENT — PAIN SCALES - GENERAL
PAINLEVEL_OUTOF10: 0 - NO PAIN
PAIN_LEVEL: 0
PAINLEVEL_OUTOF10: 0 - NO PAIN
PAINLEVEL_OUTOF10: 6

## 2025-05-16 ASSESSMENT — LIFESTYLE VARIABLES
AUDIT-C TOTAL SCORE: 0
HOW OFTEN DO YOU HAVE A DRINK CONTAINING ALCOHOL: NEVER
HOW MANY STANDARD DRINKS CONTAINING ALCOHOL DO YOU HAVE ON A TYPICAL DAY: PATIENT DOES NOT DRINK
SKIP TO QUESTIONS 9-10: 1
AUDIT-C TOTAL SCORE: 0
AUDIT-C TOTAL SCORE: 0
SKIP TO QUESTIONS 9-10: 1
HOW OFTEN DO YOU HAVE 6 OR MORE DRINKS ON ONE OCCASION: NEVER

## 2025-05-16 ASSESSMENT — ACTIVITIES OF DAILY LIVING (ADL)
LACK_OF_TRANSPORTATION: NO
LACK_OF_TRANSPORTATION: NO

## 2025-05-16 ASSESSMENT — PAIN DESCRIPTION - DESCRIPTORS: DESCRIPTORS: ACHING;DISCOMFORT

## 2025-05-16 NOTE — ANESTHESIA PREPROCEDURE EVALUATION
Patient: Argelia Valentine    Evaluation Method: In-person visit    Procedure Information       Date/Time: 25 0715    Procedure:  Section (Abdomen) - Hx of CSx3   chtn - 38.4 wks    Location: [facility] / [facility]    Surgeons: Eric Navarro MD            Relevant Problems   Cardiac   (+) Chronic hypertension in pregnancy ([facility])      Pulmonary   (+) Asthma affecting pregnancy in first trimester ([facility])   (+) Asthma exacerbation ([facility])      Neuro   (+) Bipolar disorder, unspecified (Multi)   (+) Major depressive disorder, single episode, unspecified   (+) Post-traumatic stress disorder, unspecified      Hematology   (+) Anemia of mother in pregnancy, delivered with postpartum condition      GYN   (+) 36 weeks gestation of pregnancy ([facility])   (+) Menorrhagia with irregular cycle      Digestive   (+) Hyperemesis gravidarum ([facility])      Gravid and    (+) History of  delivery, currently pregnant ([facility])       Clinical information reviewed:    Allergies  Meds               NPO Detail:  No data recorded     OB/GYN     Physical Exam    Airway  Mallampati: II  TM distance: >3 FB  Neck ROM: full  Mouth opening: 3 or more finger widths  Comments: Nasal septum piercing     Cardiovascular    Dental - normal exam     Pulmonary    Abdominal            Anesthesia Plan    History of general anesthesia?: yes  History of complications of general anesthesia?: no    ASA 3     spinal     The patient is not a current smoker.    Anesthetic plan and risks discussed with patient.  Use of blood products discussed with patient who consented to blood products.

## 2025-05-16 NOTE — SIGNIFICANT EVENT
POSTPARTUM ATONY      Called to see patient for vaginal bleeding---repeat  section done earlier this morning. Noted to have uterine atony, and passed ~ 300 ml of clot (2 occasions of 150 / 175 ml)  + history of postpartum hemorrhage with past pregnancies      Exam: Uterus firm after massage---past an additional 50 ml clot      Impression:  Post-partum Atony    Plan:  TXA-1000 mg IV push  (Patient has a history of asthma and chronic HTN, so unable to use Methergine / Cytotec)

## 2025-05-16 NOTE — CARE PLAN
The patient's goals for the shift include safe delivery and free from injury.     The clinical goals for the shift include Safe delivery, pain control management, appropriate  bonding.     Problem: Vaginal Birth or  Section  Goal: Fetal and maternal status remain reassuring during the birth process  Outcome: Met  Goal: Prevention of malpresentation/labor dystocia through delivery  Outcome: Met  Goal: Demonstrates labor coping techniques through delivery  Outcome: Met  Goal: Minimal s/sx of HDP and BP<160/110  Outcome: Met  Goal: No s/sx of infection through recovery  Outcome: Met  Goal: No s/sx of hemorrhage through recovery  Outcome: Met

## 2025-05-16 NOTE — H&P
OB Admission H&P    Assessment/Plan    Argelia Valentine is a 25 y.o.  at 38w6d, RAHEL: 2025, by Last Menstrual Period, who presents for a  delivery.    Scheduled  Section  -NPO, last ate at 10pm   -T&S, CBC, and Syphilis   -T&C: indicated  -Consent for surgery obtained, discussed risks of procedure including but not limited to: pain, bleeding, infection, damage to internal organs including bowel and bladder, possible blood transfusion.     Fetal Status  -NST reactive, reassuring   -Presentation vertex based on Leopold's maneuver  -EFW 3500g by Leapold's  -GBS unknown    Postpartum  Contraception Plan: none    31w2d Problems (from 24 to present)       Problem Noted Diagnosed Resolved    Chronic hypertension in pregnancy ([facility]) 10/8/2024 by Delon Long MD  No    Priority:  High       Overview Addendum 3/17/2025 10:10 AM by Eric Navarro MD   Not on meds  Begin   Stable blood pressure no treatment necessary         36 weeks gestation of pregnancy ([facility]) 10/8/2024 by Delon Long MD  No    Priority:  High       Overview Addendum 2025 10:01 AM by Delon Long MD   Desired provider in labor: [] CNM  [] Physician  [x] Blood Products: [x] Yes, accepts [] No, needs counseling  [x] Initial BMI: Could not be calculated   [x] Prenatal Labs:   [x] Cervical Cancer Screening up to date  [x] Rh status: b+  [x] Genetic Screening:  Ordered today  [x] NT US: (11-13 wks)  [x] Baby ASA (if indicated):  [] Pregnancy dated by:     [x] Anatomy US: (19-20 wks) WNL  [x] Federal Sterilization consent signed (if indicated): declines  [x] 1hr GCT at 24-28wks: WNL  [x] Rhogam (if indicated): NA  [x] Fetal Surveillance (if indicated): weeekly NST due to cHTN no meds  [x] Tdap (27-32 wks, may be given up to 36 wks if initial window missed):   [x] RSV (32-36 wks) (Sept. to end of ):   [x] Flu Vaccine: declines    [x] Breastfeeding: Breast and formula  [] Postpartum Birth  control method: Undecided  [x] GBS at 36 - 37 wks:  [x] 39 weeks discussion of IOL vs. Expectant management: N/A  [x] Mode of delivery ( anticipated ): RCS at 38 weeks           Hyperemesis gravidarum ([facility]) 2024 by Eric Navarro MD  No    Priority:  Medium       Overview Signed 2024 10:02 AM by Eric Navarro MD   Unison and B6  Pressure sea bands         Asthma affecting pregnancy in first trimester ([facility]) 2023 by Calixto Pizano MA  No    Priority:  Medium       Overview Signed 10/8/2024  1:21 PM by Delon Long MD   On albuterol         Poor fetal growth affecting management of mother in third trimester ([facility]) 2016 by Calixto Pizano MA  No    Priority:  Medium       Threatened miscarriage ([facility]) 2024 by Eric Navarro MD  10/8/2024 by Delon Long MD    Priority:  Medium       Anembryonic pregnancy ([facility]) 2024 by Eric Navarro MD  2025 by Delon Long MD    Priority:  Medium       Overview Signed 2024 10:01 AM by Eric Navarro MD   No fetal pole on 9 week preg  Repeat U/S  Follow up 1 week                 Subjective   Good fetal movement.  Denies vaginal bleeding., Denies contractions., Denies leaking of fluid.      Prenatal Provider Ramon    OB History    Para Term  AB Living   6 3 2 1 2 3   SAB IAB Ectopic Multiple Live Births   1 1 0 0 3      # Outcome Date GA Lbr Jonn/2nd Weight Sex Type Anes PTL Lv   6 Current            5 SAB      Complete      4 Term     M CS-LTranv      3 Term     M CS-LTranv      2  12/23/15 32w0d   F CS-LTranv   BETZY      Complications: Severe pre-eclampsia   1 IAB 12     D&E Gen         Surgical History[1]    Social History     Tobacco Use    Smoking status: Some Days     Types: Cigarettes, Cigars     Passive exposure: Never    Smokeless tobacco: Never    Tobacco comments:     Infrequent smoker.  Patient notes is not a problem   Substance Use Topics    Alcohol  use: Never       Allergies[2]    Prescriptions Prior to Admission[3]  Objective     Last Vitals  Temp Pulse Resp BP MAP O2 Sat                   Blood Pressures    No data found in the last 1 encounters.          Physical Exam  General: NAD, mood appropriate  Cardiopulmonary: warm and well perfused, breathing comfortably on room air  Abdomen: Gravid, non-tender  Extremities: Symmetric  Speculum Exam:        Fetal Monitoring  Baseline: 145 bpm, Variability: moderate,  Accelerations: present and Decelerations: none  Uterine Activity: No contractions seen on toco  Interpretation: Reactive    Bedside ultrasound: No    Labs in chart were reviewed.  CBC      Results from last 7 days   Lab Units 25  0647 25  0836   [facility] AST U/L  --  13   [facility] ALT U/L  --  8   CREATININE mg/dL 0.43*  --    [facility] CREATININE mg/dL  --  0.49*        Prenatal labs reviewed, remarkable for anemia.             [1]   Past Surgical History:  Procedure Laterality Date    OTHER SURGICAL HISTORY  10/27/2020    Dilation and curettage    OTHER SURGICAL HISTORY  10/27/2020     section   [2]   Allergies  Allergen Reactions    Sumatriptan Anaphylaxis   [3]   Medications Prior to Admission   Medication Sig Dispense Refill Last Dose/Taking    aspirin 81 mg chewable tablet Chew 2 tablets (162 mg) once daily. 60 tablet 11 More than a month    lurasidone (Latuda) 20 mg tablet  (Patient taking differently: Take 3 tablets (60 mg) by mouth once daily at bedtime.)   More than a month    Prenatal Vitamin 27 mg iron- 0.8 mg tablet Take 1 tablet by mouth early in the morning..   More than a month    albuterol 90 mcg/actuation inhaler Inhale 2 puffs every 4 hours if needed for wheezing. (Patient not taking: Reported on 2025) 18 g 0     doxylamine (Unisom) 25 mg tablet Take 1 tablet (25 mg) by mouth once daily at bedtime. (Patient not taking: Reported on 2025) 60 tablet 0

## 2025-05-16 NOTE — Clinical Note
May 19, 2025     Patient: Argelia Valentine   YOB: 2000   Date of Visit: 4/11/2025       To Whom It May Concern:    Argelia Valentine was seen in my clinic on 4/11/2025 at . Please excuse Argelia for her absence from work on this day to make the appointment.    If you have any questions or concerns, please don't hesitate to call.         Sincerely,         No name on file        CC: No Recipients

## 2025-05-16 NOTE — ANESTHESIA PROCEDURE NOTES
Spinal Block    Patient location during procedure: OR  Start time: 5/16/2025 8:46 AM  End time: 5/16/2025 8:57 AM  Reason for block: primary anesthetic  Staffing  Performed: CRNA   Authorized by: ELLEN Camacho    Performed by: ELLEN Camacho    Preanesthetic Checklist  Completed: patient identified, IV checked, risks and benefits discussed, surgical consent, pre-op evaluation, timeout performed and sterile techniques followed  Block Timeout  RN/Licensed healthcare professional reads aloud to the Anesthesia provider and entire team: Patient identity, procedure with side and site, patient position, and as applicable the availability of implants/special equipment/special requirements.  Patient on coagulant treatment: no  Timeout performed at: 5/16/2025 8:44 AM  Spinal Block  Prep: ChloraPrep  Sterility prep: cap, gloves and mask  Sedation level: no sedation  Patient monitoring: blood pressure, continuous pulse oximetry and heart rate  Approach: midline  Vertebral space: L3-4  Injection technique: single-shot  Needle  Needle type: pencil-point   Needle gauge: 25 G  Needle length: 3.5 in  Free flowing CSF: yes    Assessment  Sensory level: T4  Block outcome: patient comfortable  Procedure assessment: patient tolerated procedure well with no immediate complications  Additional Notes  Two levels attempted due to curve in spine

## 2025-05-16 NOTE — ANESTHESIA POSTPROCEDURE EVALUATION
Patient: Argelia Valentine    Procedure Summary       Date: 25 Room / Location: [facility] OB 02 / [facility] OB    Anesthesia Start: 846 Anesthesia Stop:     Procedure:  Section (Abdomen) Diagnosis:       History of 3  sections      (History of 3  sections [Z98.891])    Surgeons: Eric Navarro MD Responsible Provider: ELLEN Camacho    Anesthesia Type: spinal ASA Status: 3            Anesthesia Type: spinal    Vitals Value Taken Time   BP 98/65 25 11:22   Temp 36.4 °C (97.5 °F) 25 11:22   Pulse 61 25 11:32   Resp 18 25 11:22   SpO2 100 % 25 11:32       Anesthesia Post Evaluation    Patient location during evaluation: bedside  Patient participation: complete - patient participated  Level of consciousness: awake  Pain score: 0  Pain management: adequate  Airway patency: patent  Cardiovascular status: acceptable  Respiratory status: acceptable  Hydration status: acceptable  Postoperative Nausea and Vomiting: none        No notable events documented.

## 2025-05-16 NOTE — LETTER
FAX [phone]    To whom it may concern:     Argelia Valentine delivered Baby Girl Serge on 2025 and will be discharged to home with her  child this week and would benefit from an extension related to her electric bill payment due date related to medical care for herself as she recovers from surgery as well as the care of her .     Thank you,   CHARLY Barth

## 2025-05-16 NOTE — L&D DELIVERY NOTE
Birth Operative Report    Patient Name: Argelia Valentine  : 2000  MRN: [id number]  Age: 25 y.o.    /Para:   Gestational Age: 38w6d    Date of Surgery: 2025    Operating Room Location: [facility] OB 02    Pre-op Diagnosis:  Intrauterine Pregnancy at 38w6d  chronic hypertension  Previous  section x 3    Post-op Diagnosis:  Same    Procedure:   Repeat Low Transverse  Section    Surgery Category at [facility] Time: Scheduled nonurgent    Surgeon:    * Eric Navarro - Primary    Resident/Fellow/Other Assistant: Huong FLORENCE  Surgeons and Role:     * JAZ Servin - Assisting    Anesthesia:  Type: spinal     CRNA: JOSH Camacho-CRNA    Surgical Staff:  Circulator: Ita Schroeder RN; Samantha Ramirez RN  Scrub Person: Scout Markham     Preoperative Antibiotics: Ancef 2 g    Indication for Procedure:   25 y.o.  at 38w6d chronic hypertension at 38 weeks    Informed Consent:  The risks, benefits, complications, and alternatives were discussed with the patient. The patient understood that the risks of  section include but are not limited to infection, bleeding, injury to nearby structures or organs, possible need for transfusion, and potential need for more surgery. The patient stated understanding and desired to proceed. All questions were answered. The site of surgery was properly noted and marked. The patient's identity was confirmed, and the procedure verified as a  delivery. A Time Out was held and the above information confirmed.     Findings: Normal tubes and ovaries.  Thin lower uterine segment.  Scant adhesions.  Normal appearing gravid uterus, fallopian tubes, and ovaries. Amniotic fluid Clear, Female infant in Vertex Occiput Transverse presentation, APGARS 7 , 7 .  Birth Weight 2.72 kg.    Description of Procedure:   Patient was taken to the OR where regional anesthesia was found to be adequate.  She was then placed in the dorsal supine  position with a left lateral tilt. A de leon catheter was placed, SCDs were applied, and a vaginal prep was performed. A pre-procedure time out was performed. The patient was given a prophylactic dose of IV antibiotics.  She was then prepped and draped in the usual sterile fashion.     A Pfannenstiel skin incision was made with the scalpel through the skin and subcutaneous fat to the underlying fascial layer. The fascia was incised in the midline with the scalpel and the incision was extended bilaterally. The superior aspect of the incision was grasped, tented up with Kocher clamps and the rectus muscle was dissected off. The muscles were divided in the midline, the peritoneum was then identified and entered bluntly and incision extended superiorly and inferiorly taking care to avoid underlying viscera.  The bladder blade was inserted.       Uterine Incision was made with the scalpel, the uterine cavity was entered, and the hysterotomy was extended cephalocaudally by stretching. The infant was delivered atraumatically, the cord was clamped and cut and infant was handed off to awaiting nursing.  A cord segment and blood sample were collected. The placenta was then expressed. The uterus was cleared of all clot and debris. The uterine incision was repaired using a running locked stitch of #1 Monocryl in one layer. Adequate hemostasis was noted.    A figure-of-eight brought complete hemostasis at the extension into the lower uterine segment.  Bladder was examined and found to be clear from applied sutures.  Rectus muscles were reapproximated with figure-of-eight 2-0 Vicryl suture    The hysterotomy was again evaluated and found to be hemostatic, the underside of the fascia and bladder and the rectus muscles were also found to be hemostatic. The fascia was closed using a running stitch of 0 Vicryl suture.  The subcutaneous layer was irrigated, small bleeders were cauterized. The subcutaneous layer was re-approximated using  a running stitch of 3-0 Monocryl. The skin was closed with 4-0 Vicryl.         * Eric Navarro - Primary was present for key portions of the procedure.    Complications:    None       Anesthesia Record               Intraprocedure I/O Totals          Intake    lactated Ringer's bolus 1,000 mL 1400.00 mL    Oxytocin Drip 0.00 mL    The total shown is the total volume documented since Anesthesia Start was filed.    Total Intake 1400 mL       Output    Urine 100 mL    Total Blood Loss - Surgical Delivery (mL) 500 mL    Total Output 600 mL       Net    Net Volume 800 mL       Other (could not be determined as input or output)    Surgical Delivery Estimated Blood Loss (mL) 500            Blood products: None  Blood Product Administration History       None            Uterotonics/Hemostatic Agent: IV Pitocin 30 units    Specimen:   Placenta  Delivered: 2025  9:26 AM  Appearance: Intact  Removal: Expressed    Disposition: pathology    Sponge/Instrument/Needle Counts: The sponge, lap and needle counts were correct.    Patient Disposition: Patient recovering on labor and delivery in stable condition.    Additional Procedures:  None    Task Performed by: RN or PA Surgical Assistant: Necessary to the procedure      Irena Valentine [[id number]]      Labor Events    Rupture date/time: 2025  Rupture type: Artificial  Fluid color: Clear  Fluid odor: None  Labor type:  Without Labor  Labor allowed to proceed with plans for an attempted vaginal birth?: No  Complications: None       Placenta    Placenta delivery date/time: 2025 09:26  Placenta removal: Expressed  Placenta appearance: Intact  Placenta disposition: pathology       Cord    Vessels: 3 vessels  Complications: None  Delayed cord clamping?: Yes  Cord clamped date/time: 2025 09:24:00  Cord blood disposition: Discarded  Gases sent?: No       Lacerations    Episiotomy: None       Anesthesia    Method: Spinal       Operative Delivery    Forceps  attempted?: No  Vacuum extractor attempted?: No       Shoulder Dystocia    Shoulder dystocia present?: No        Delivery    Birth date/time: 2025 09:24:00  Delivery type: , Low Transverse   categorization: repeat   priority: scheduled  Indications for : Repeat   Complications: None       Resuscitation    Method: Suctioning, Tactile stimulation, Supplemental oxygen, Continuous positive airway pressure (CPAP)       Apgars    Living status: Living  Apgar Component Scores:  1 min.:  5 min.:  10 min.:  15 min.:  20 min.:    Skin color:  0  0  1      Heart rate:  2  2  2      Reflex irritability:  2  2  2      Muscle tone:  2  2  2      Respiratory effort:  1  1  2      Total:  7  7  9             Delivery Providers    Delivering clinician: Eric Navarro MD   Provider Role    Ita Schroeder, RN Delivery Nurse    Cherie Walton, RN Nursery Nurse    Samantha Ramirez, RN Delivery Nurse    Vikki Onofre, RN Nursery Nurse

## 2025-05-17 LAB
ERYTHROCYTE [DISTWIDTH] IN BLOOD BY AUTOMATED COUNT: ABNORMAL %
HCT VFR BLD AUTO: 28.4 % (ref 36–46)
HGB BLD-MCNC: 9 G/DL (ref 12–16)
MCH RBC QN AUTO: 24.1 PG (ref 26–34)
MCHC RBC AUTO-ENTMCNC: 31.7 G/DL (ref 32–36)
MCV RBC AUTO: 76 FL (ref 80–100)
NRBC BLD-RTO: 0 /100 WBCS (ref 0–0)
PLATELET # BLD AUTO: 158 X10*3/UL (ref 150–450)
RBC # BLD AUTO: 3.73 X10*6/UL (ref 4–5.2)
WBC # BLD AUTO: 12 X10*3/UL (ref 4.4–11.3)

## 2025-05-17 PROCEDURE — 36415 COLL VENOUS BLD VENIPUNCTURE: CPT | Performed by: OBSTETRICS & GYNECOLOGY

## 2025-05-17 PROCEDURE — 1100000001 HC PRIVATE ROOM DAILY

## 2025-05-17 PROCEDURE — 2500000004 HC RX 250 GENERAL PHARMACY W/ HCPCS (ALT 636 FOR OP/ED): Mod: JZ | Performed by: OBSTETRICS & GYNECOLOGY

## 2025-05-17 PROCEDURE — 85027 COMPLETE CBC AUTOMATED: CPT | Performed by: OBSTETRICS & GYNECOLOGY

## 2025-05-17 PROCEDURE — 2500000001 HC RX 250 WO HCPCS SELF ADMINISTERED DRUGS (ALT 637 FOR MEDICARE OP): Performed by: OBSTETRICS & GYNECOLOGY

## 2025-05-17 RX ADMIN — OXYCODONE HYDROCHLORIDE 10 MG: 5 TABLET ORAL at 18:43

## 2025-05-17 RX ADMIN — IBUPROFEN 600 MG: 600 TABLET, FILM COATED ORAL at 16:14

## 2025-05-17 RX ADMIN — KETOROLAC TROMETHAMINE 30 MG: 30 INJECTION, SOLUTION INTRAMUSCULAR; INTRAVENOUS at 04:27

## 2025-05-17 RX ADMIN — OXYCODONE HYDROCHLORIDE 5 MG: 5 TABLET ORAL at 14:09

## 2025-05-17 RX ADMIN — ACETAMINOPHEN 975 MG: 325 TABLET, FILM COATED ORAL at 04:27

## 2025-05-17 RX ADMIN — IBUPROFEN 600 MG: 600 TABLET, FILM COATED ORAL at 10:21

## 2025-05-17 RX ADMIN — ACETAMINOPHEN 975 MG: 325 TABLET, FILM COATED ORAL at 16:14

## 2025-05-17 RX ADMIN — ACETAMINOPHEN 975 MG: 325 TABLET, FILM COATED ORAL at 22:31

## 2025-05-17 RX ADMIN — ACETAMINOPHEN 975 MG: 325 TABLET, FILM COATED ORAL at 10:21

## 2025-05-17 RX ADMIN — IBUPROFEN 600 MG: 600 TABLET, FILM COATED ORAL at 22:31

## 2025-05-17 ASSESSMENT — PAIN SCALES - GENERAL
PAINLEVEL_OUTOF10: 6
PAINLEVEL_OUTOF10: 1
PAINLEVEL_OUTOF10: 7

## 2025-05-17 ASSESSMENT — PAIN DESCRIPTION - DESCRIPTORS
DESCRIPTORS: SHARP
DESCRIPTORS: TENDER

## 2025-05-17 NOTE — PROGRESS NOTES
Postpartum Progress Note    Assessment/Plan   Argelia Valentine is a 25 y.o., , who delivered at 38w6d gestation and is now postpartum day 1.    Feels well.  Pain controlled    Assessment & Plan  Chronic hypertension in pregnancy ([facility])    Delivery by elective  section ([facility])    History of 3  sections    31w2d Problems (from 24 to present)       Problem Noted Diagnosed Resolved    Chronic hypertension in pregnancy ([facility]) 10/8/2024 by Delon Long MD  No    Priority:  Medium       Overview Addendum 3/17/2025 10:10 AM by Eric Navarro MD   Not on meds  Begin   Stable blood pressure no treatment necessary         Hyperemesis gravidarum ([facility]) 2024 by Eric Navarro MD  No    Priority:  Medium       Overview Signed 2024 10:02 AM by Eric Navarro MD   Unison and B6  Pressure sea bands         Asthma affecting pregnancy in first trimester ([facility]) 2023 by Calixto Pizano MA  No    Priority:  Medium       Overview Signed 10/8/2024  1:21 PM by Delon Long MD   On albuterol         Poor fetal growth affecting management of mother in third trimester ([facility]) 2016 by Calixto Pizano MA  No    Priority:  Medium       36 weeks gestation of pregnancy ([facility]) 10/8/2024 by Delon Long MD  2025 by Sathya Jones MD    Priority:  Medium       Overview Addendum 2025 10:01 AM by Delon Long MD   Desired provider in labor: [] CNM  [] Physician  [x] Blood Products: [x] Yes, accepts [] No, needs counseling  [x] Initial BMI: Could not be calculated   [x] Prenatal Labs:   [x] Cervical Cancer Screening up to date  [x] Rh status: b+  [x] Genetic Screening:  Ordered today  [x] NT US: (11-13 wks)  [x] Baby ASA (if indicated):  [] Pregnancy dated by:     [x] Anatomy US: (19-20 wks) WNL  [x] Federal Sterilization consent signed (if indicated): declines  [x] 1hr GCT at 24-28wks: WNL  [x] Rhogam (if indicated): NA  [x] Fetal  Surveillance (if indicated): weeekly NST due to cHTN no meds  [x] Tdap (27-32 wks, may be given up to 36 wks if initial window missed):   [x] RSV (32-36 wks) (Sept. to end of Jan):   [x] Flu Vaccine: declines    [x] Breastfeeding: Breast and formula  [] Postpartum Birth control method: Undecided  [x] GBS at 36 - 37 wks:  [x] 39 weeks discussion of IOL vs. Expectant management: N/A  [x] Mode of delivery ( anticipated ): RCS at 38 weeks           Threatened miscarriage ([facility]) 9/30/2024 by Eric Navarro MD  10/8/2024 by Delon Long MD    Priority:  Medium       Anembryonic pregnancy ([facility]) 9/30/2024 by Eric Navarro MD  4/25/2025 by Delon Long MD    Priority:  Medium       Overview Signed 9/30/2024 10:01 AM by Eric Navarro MD   No fetal pole on 9 week preg  Repeat U/S  Follow up 1 week               Hospital course: no complications    Subjective   Her pain is moderately controlled with current medications  She is passing flatus  She is ambulating well  She is tolerating a Adult diet Regular  She reports no breast or nursing problems  She denies emotional concerns today   Her plan for contraception is none         Objective   Allergies:   Sumatriptan         Last Vitals:  Temp Pulse Resp BP MAP Pulse Ox   36.3 °C (97.3 °F) 59 16 113/74 81 99 %     Vitals Min/Max Last 24 Hours:  Temp  Min: 36 °C (96.8 °F)  Max: 36.9 °C (98.4 °F)  Pulse  Min: 54  Max: 74  Resp  Min: 16  Max: 18  BP  Min: 94/56  Max: 113/74  MAP (mmHg)  Min: 61  Max: 81      Alert and oriented.  abd soft, nt  fundus firm, nt  extrem - no erythema, callor, cords or pain  Dressing clean dry intact  Intake/Output:     Intake/Output Summary (Last 24 hours) at 5/17/2025 1326  Last data filed at 5/17/2025 0455  Gross per 24 hour   Intake --   Output 825 ml   Net -825 ml       Physical Exam:

## 2025-05-17 NOTE — LACTATION NOTE
Lactation Consultant Note  Lactation Consultation  Reason for Consult: Follow-up assessment  Consultant Name: GIANFRANCO Jon    Maternal Information       Maternal Assessment       Infant Assessment       Feeding Assessment       LATCH TOOL       Breast Pump       Other OB Lactation Tools       Patient Follow-up       Other OB Lactation Documentation       Recommendations/Summary  In for initial assessment with mom. Infant at time of assessment under 24 hours old. Infant with 7 feeds 2 voids and 5 stools. Weight loss initially with a 3.45 % discrepancy/ weight loss on admission to postpartum. Mom is an experienced BF. Mom states she didn't wake infant for feeds during the night. Reviewed frequency of feeds needed to help keep weight loss down and encourage milk supply. Infants weight loss at 24 hours 7.68% this doesn't factor in possible discrepancy. Mom does not want formula supplementation recommended by Peds at this time. Encouraged mom to pump every 2-3 hours after infant feeds and offer pump milk or donor milk can be used for supplementation at this time. Set up pump with mom and explained use Pump handed to mom.

## 2025-05-17 NOTE — PROGRESS NOTES
Patient: Argelia Valentine    /74 (BP Location: Right arm, Patient Position: Lying)   Pulse 59   Temp 36.3 °C (97.3 °F) (Temporal)   Resp 16   Ht 1.524 m (5')   Wt 73 kg (160 lb 15 oz)   LMP 08/17/2024   SpO2 99%   Breastfeeding Yes   BMI 31.43 kg/m²     No redness, swelling, or drainage at puncture site.    Complete resolution of numbness. Patient is able to lift legs, bend at the knees, and ambulate.    Patient denies problems with urination.    Patient denies nausea, headache or severe back pain.     [unfilled]

## 2025-05-18 VITALS
RESPIRATION RATE: 18 BRPM | HEIGHT: 60 IN | DIASTOLIC BLOOD PRESSURE: 71 MMHG | OXYGEN SATURATION: 97 % | HEART RATE: 76 BPM | BODY MASS INDEX: 31.6 KG/M2 | TEMPERATURE: 98.4 F | SYSTOLIC BLOOD PRESSURE: 118 MMHG | WEIGHT: 160.94 LBS

## 2025-05-18 PROCEDURE — 1100000001 HC PRIVATE ROOM DAILY

## 2025-05-18 PROCEDURE — 2500000005 HC RX 250 GENERAL PHARMACY W/O HCPCS: Performed by: OBSTETRICS & GYNECOLOGY

## 2025-05-18 PROCEDURE — 2500000001 HC RX 250 WO HCPCS SELF ADMINISTERED DRUGS (ALT 637 FOR MEDICARE OP): Performed by: OBSTETRICS & GYNECOLOGY

## 2025-05-18 PROCEDURE — 2500000004 HC RX 250 GENERAL PHARMACY W/ HCPCS (ALT 636 FOR OP/ED): Performed by: OBSTETRICS & GYNECOLOGY

## 2025-05-18 RX ADMIN — IBUPROFEN 600 MG: 600 TABLET, FILM COATED ORAL at 10:52

## 2025-05-18 RX ADMIN — OXYCODONE HYDROCHLORIDE 5 MG: 5 TABLET ORAL at 00:28

## 2025-05-18 RX ADMIN — OXYCODONE HYDROCHLORIDE 5 MG: 5 TABLET ORAL at 08:41

## 2025-05-18 RX ADMIN — OXYCODONE HYDROCHLORIDE 5 MG: 5 TABLET ORAL at 12:40

## 2025-05-18 RX ADMIN — OXYCODONE HYDROCHLORIDE 5 MG: 5 TABLET ORAL at 04:47

## 2025-05-18 RX ADMIN — SIMETHICONE 80 MG: 80 TABLET, CHEWABLE ORAL at 16:47

## 2025-05-18 RX ADMIN — IBUPROFEN 600 MG: 600 TABLET, FILM COATED ORAL at 16:48

## 2025-05-18 RX ADMIN — ACETAMINOPHEN 975 MG: 325 TABLET, FILM COATED ORAL at 10:52

## 2025-05-18 RX ADMIN — ACETAMINOPHEN 975 MG: 325 TABLET, FILM COATED ORAL at 22:29

## 2025-05-18 RX ADMIN — LIDOCAINE 1 PATCH: 4 PATCH TOPICAL at 00:29

## 2025-05-18 RX ADMIN — IBUPROFEN 600 MG: 600 TABLET, FILM COATED ORAL at 04:35

## 2025-05-18 RX ADMIN — OXYCODONE HYDROCHLORIDE 10 MG: 5 TABLET ORAL at 22:33

## 2025-05-18 RX ADMIN — ACETAMINOPHEN 975 MG: 325 TABLET, FILM COATED ORAL at 16:48

## 2025-05-18 RX ADMIN — POLYETHYLENE GLYCOL 3350 17 G: 17 POWDER, FOR SOLUTION ORAL at 10:53

## 2025-05-18 RX ADMIN — IBUPROFEN 600 MG: 600 TABLET, FILM COATED ORAL at 22:29

## 2025-05-18 RX ADMIN — SIMETHICONE 80 MG: 80 TABLET, CHEWABLE ORAL at 22:33

## 2025-05-18 RX ADMIN — ACETAMINOPHEN 975 MG: 325 TABLET, FILM COATED ORAL at 04:35

## 2025-05-18 RX ADMIN — OXYCODONE HYDROCHLORIDE 5 MG: 5 TABLET ORAL at 16:47

## 2025-05-18 ASSESSMENT — PAIN DESCRIPTION - LOCATION
LOCATION: ABDOMEN

## 2025-05-18 ASSESSMENT — PAIN SCALES - GENERAL
PAINLEVEL_OUTOF10: 6
PAINLEVEL_OUTOF10: 7
PAINLEVEL_OUTOF10: 5 - MODERATE PAIN
PAINLEVEL_OUTOF10: 7
PAINLEVEL_OUTOF10: 7
PAINLEVEL_OUTOF10: 5 - MODERATE PAIN
PAINLEVEL_OUTOF10: 6
PAINLEVEL_OUTOF10: 7
PAINLEVEL_OUTOF10: 7
PAINLEVEL_OUTOF10: 9

## 2025-05-18 ASSESSMENT — PAIN DESCRIPTION - DESCRIPTORS
DESCRIPTORS: SORE;TIGHTNESS
DESCRIPTORS: CRAMPING;SORE;TENDER
DESCRIPTORS: SORE
DESCRIPTORS: ACHING;CRAMPING;SORE
DESCRIPTORS: SORE

## 2025-05-18 ASSESSMENT — PAIN - FUNCTIONAL ASSESSMENT
PAIN_FUNCTIONAL_ASSESSMENT: 0-10

## 2025-05-18 NOTE — CARE PLAN
The patient's goals for the shift include to feel better    The clinical goals for the shift include fundus and lochia will remain WNL    Problem: Postpartum  Goal: Experiences normal postpartum course  Outcome: Progressing  Goal: Appropriate maternal -  bonding  Outcome: Progressing  Goal: Establish and maintain infant feeding pattern for adequate nutrition  Outcome: Progressing  Goal: Incisions, wounds, or drain sites healing without S/S of infection  Outcome: Progressing  Goal: No s/sx infection  Outcome: Progressing  Goal: No s/sx of hemorrhage  Outcome: Progressing     Problem:  Recovery Care  Goal: Verbalizes understanding of post-op instructions  Outcome: Progressing  Goal: Manages discomfort  Outcome: Progressing  Goal: Dressing intact until removed with any drainage marked  Outcome: Progressing  Goal: Patient vital signs are stable  Outcome: Progressing  Goal: Urine output is 0.5 mL/kg/hr or more  Outcome: Progressing  Goal: Fundus firm at midline  Outcome: Progressing     Problem: Hypertensive Disorder of Pregnancy (HDP)  Goal: Minimal s/sx of HDP and BP<160/110  Outcome: Progressing  Goal: Adequate urine output (0.5 ml/kg/hr)  Outcome: Progressing     Problem: Postpartum hemorrhage  Goal: Hemodynamic stability and limit blood loss  Outcome: Progressing     Problem: Pain - Adult  Goal: Verbalizes/displays adequate comfort level or baseline comfort level  Outcome: Progressing     Problem: Safety - Adult  Goal: Free from fall injury  Outcome: Progressing     Problem: Discharge Planning  Goal: Discharge to home or other facility with appropriate resources  Outcome: Progressing

## 2025-05-18 NOTE — CARE PLAN
Problem: Postpartum  Goal: Experiences normal postpartum course  Outcome: Progressing  Goal: Appropriate maternal -  bonding  Outcome: Progressing  Goal: Establish and maintain infant feeding pattern for adequate nutrition  Outcome: Progressing  Goal: Incisions, wounds, or drain sites healing without S/S of infection  Outcome: Progressing  Flowsheets (Taken 2025)  Incisions, wounds, or drain sites healing without sign and symptoms of infection:   ADMISSION and DAILY: Assess and document risk factors for pressure ulcer development   TWICE DAILY: Assess and document skin integrity   TWICE DAILY: Assess and document dressing/incision, wound bed, drain sites and surrounding tissue  Goal: No s/sx infection  Outcome: Progressing  Goal: No s/sx of hemorrhage  Outcome: Progressing  Goal: Minimal s/sx of HDP and BP<160/110  Outcome: Progressing     Problem:  Recovery Care  Goal: Verbalizes understanding of post-op instructions  Outcome: Progressing  Goal: Manages discomfort  Outcome: Progressing  Goal: Dressing intact until removed with any drainage marked  Outcome: Progressing  Goal: Patient vital signs are stable  Outcome: Progressing  Goal: Urine output is 0.5 mL/kg/hr or more  Outcome: Progressing  Goal: Fundus firm at midline  Outcome: Progressing     Problem: Hypertensive Disorder of Pregnancy (HDP)  Goal: Minimal s/sx of HDP and BP<160/110  Outcome: Progressing  Goal: Adequate urine output (0.5 ml/kg/hr)  Outcome: Progressing     Problem: Nausea/Vomiting  Goal: Adequate urine output (0.5 ml/kg/hr)  Outcome: Progressing  Goal: Free from nausea/vomiting  Outcome: Progressing  Goal: Tolerates prescribed diet  Outcome: Progressing  Goal: Weight maintenance or gain  Outcome: Progressing  Goal: Achieve/maintain normal electrolyte level  Outcome: Progressing     Problem: UH VAGINAL BLEEDING/HEMORRHAGE AP  Goal: Fewer then 4-6 ct per hour  Outcome: Progressing  Goal: No s/sx of hemorrhage  Outcome:  Progressing     Problem: Postpartum hemorrhage  Goal: Hemodynamic stability and limit blood loss  Outcome: Progressing     Problem: Pain - Adult  Goal: Verbalizes/displays adequate comfort level or baseline comfort level  Outcome: Progressing     Problem: Safety - Adult  Goal: Free from fall injury  Outcome: Progressing     Problem: Discharge Planning  Goal: Discharge to home or other facility with appropriate resources  Outcome: Progressing     Problem: Vaginal Birth or  Section  Goal: Minimal s/sx of HDP and BP<160/110  Outcome: Progressing  Goal: Fetal and maternal status remain reassuring during the birth process  Outcome: Progressing  Goal: Tolerate CRB for IOL placement maintenance until dislodgement/removal 12hrs after placement  Outcome: Progressing  Goal: Prevention of malpresentation/labor dystocia through delivery  Outcome: Progressing  Goal: Demonstrates labor coping techniques through delivery  Outcome: Progressing  Goal: No s/sx of infection through recovery  Outcome: Progressing  Goal: No s/sx of hemorrhage through recovery  Outcome: Progressing   The patient's goals for the shift include rest/less pain    The clinical goals for the shift include pain mangement

## 2025-05-18 NOTE — LACTATION NOTE
Lactation Consultant Note  Lactation Consultation  Reason for Consult: Follow-up assessment  Consultant Name: GIANFRANCO Jon    Maternal Information  Infant to breast within first 2 hours of birth?: Yes  Exclusive Pump and Bottle Feed: No    Maternal Assessment  Breast Assessment: Filling, Large  Nipple Assessment: Intact, Erect  [place] Assessment: Normal    Infant Assessment       Feeding Assessment  Nutrition Source: Breastmilk  Feeding Method: Nursing at the breast, Feeding expressed breastmilk  Feeding Position: Football/seated  Suck/Feeding: Sustained    LATCH TOOL  Latch: Grasps breast, tongue down, lips flanged, rhythmic sucking  Audible Swallowing: Spontaneous and intermittent (24 hours old)  Type of Nipple: Everted (After stimulation)  Comfort (Breast/Nipple): Soft/non-tender  Hold (Positioning): No assist from staff, mother able to position/hold infant  LATCH Score: 10    Breast Pump       Other OB Lactation Tools       Patient Follow-up  Inpatient Lactation Follow-up Needed : No    Other OB Lactation Documentation       Recommendations/Summary  In for follow up visit. Mom states her milk supply is in. Moms breast more firm, ice packs given for comfort. Mom has been pumping to help milk supply. Weight loss on NEWT chart improved from yesterday of greater than 95% to above the 75th percentile. Weight loss at 7.68% to 8.45% at 46 hours. Infant with 5 voids and 2 stools at 48 hours of life. Mom supported and encouraged. Mom denies another latch assessment needed. Reviewed outpatient resources for BF support if needed.

## 2025-05-18 NOTE — PROGRESS NOTES
Postpartum Progress Note    Assessment/Plan   Argelia Valentine is a 25 y.o., , who delivered at 38w6d gestation and is now postpartum day 2.    Plan discharge tomorrow if patient remains stable    Assessment & Plan  Chronic hypertension in pregnancy ([facility])    Delivery by elective  section ([facility])    History of 3  sections    31w2d Problems (from 24 to present)       Problem Noted Diagnosed Resolved    Chronic hypertension in pregnancy ([facility]) 10/8/2024 by Delon Long MD  No    Priority:  Medium       Overview Addendum 3/17/2025 10:10 AM by Eric Navarro MD   Not on meds  Begin   Stable blood pressure no treatment necessary         Hyperemesis gravidarum ([facility]) 2024 by Eric Navarro MD  No    Priority:  Medium       Overview Signed 2024 10:02 AM by Eric Navarro MD   Unison and B6  Pressure sea bands         Asthma affecting pregnancy in first trimester ([facility]) 2023 by Calixto Pizano MA  No    Priority:  Medium       Overview Signed 10/8/2024  1:21 PM by Delon Long MD   On albuterol         Poor fetal growth affecting management of mother in third trimester ([facility]) 2016 by Calixto Pizano MA  No    Priority:  Medium       36 weeks gestation of pregnancy ([facility]) 10/8/2024 by Delon Long MD  2025 by Sathya Jones MD    Priority:  Medium       Overview Addendum 2025 10:01 AM by Delon Long MD   Desired provider in labor: [] CNM  [] Physician  [x] Blood Products: [x] Yes, accepts [] No, needs counseling  [x] Initial BMI: Could not be calculated   [x] Prenatal Labs:   [x] Cervical Cancer Screening up to date  [x] Rh status: b+  [x] Genetic Screening:  Ordered today  [x] NT US: (11-13 wks)  [x] Baby ASA (if indicated):  [] Pregnancy dated by:     [x] Anatomy US: (19-20 wks) WNL  [x] Federal Sterilization consent signed (if indicated): declines  [x] 1hr GCT at 24-28wks: WNL  [x] Rhogam (if indicated):  NA  [x] Fetal Surveillance (if indicated): weeekly NST due to cHTN no meds  [x] Tdap (27-32 wks, may be given up to 36 wks if initial window missed):   [x] RSV (32-36 wks) (Sept. to end of Jan):   [x] Flu Vaccine: declines    [x] Breastfeeding: Breast and formula  [] Postpartum Birth control method: Undecided  [x] GBS at 36 - 37 wks:  [x] 39 weeks discussion of IOL vs. Expectant management: N/A  [x] Mode of delivery ( anticipated ): RCS at 38 weeks           Threatened miscarriage ([facility]) 9/30/2024 by Eric Navarro MD  10/8/2024 by Delon Long MD    Priority:  Medium       Anembryonic pregnancy ([facility]) 9/30/2024 by Eric Navarro MD  4/25/2025 by Delon Long MD    Priority:  Medium       Overview Signed 9/30/2024 10:01 AM by Eric Navarro MD   No fetal pole on 9 week preg  Repeat U/S  Follow up 1 week               Hospital course: chronic hypertension  RCS  Blood type is pos    Subjective   Her pain is well controlled with current medications  She is passing flatus  She is ambulating well  She is tolerating a Adult diet Regular  She reports no breast or nursing problems  She denies emotional concerns today   Her plan for contraception is none         Objective   Allergies:   Sumatriptan         Last Vitals:  Temp Pulse Resp BP MAP Pulse Ox   36.7 °C (98.1 °F) 56 18 108/69 74 99 %     Vitals Min/Max Last 24 Hours:  Temp  Min: 36.4 °C (97.5 °F)  Max: 37.2 °C (99 °F)  Pulse  Min: 56  Max: 66  Resp  Min: 18  Max: 18  BP  Min: 108/69  Max: 117/76  MAP (mmHg)  Min: 74  Max: 83    Intake/Output:   No intake or output data in the 24 hours ending 05/18/25 0821    Physical Exam:  General: Examination reveals a well developed, well nourished, female, in no acute distress. She is alert and cooperative.  Breasts: breasts appear normal for pregnancy, no suspicious masses, no skin or nipple changes or axillary nodes.  Incision: healing well, no erythema, no hernia, small amount of drainage - noted on  dressing, will monitor.  Fundus: firm, below umbilicus, and nontender.  Psychological: awake and alert; oriented to person, place, and time.    Chaperone Present: Yes.  Chaperone Name/Title: RN  Examination Chaperoned: Gynecological Exam    Lab Data:  Labs in chart were reviewed.   [Consultation] : a consultation visit [Parent] : parent

## 2025-05-18 NOTE — NURSING NOTE
After performing a physical reassessment of patient and providing meds, nurse advised patient to try all the interventions suggested earlier. Nurse also asked the patient to continue updating nursing team if condition worsens or is not improving. Suggested trying a few doses of Simethicone to rule out gas discomfort. Patient states she does not feel condition is r/t history of asthma at this time.

## 2025-05-18 NOTE — NURSING NOTE
"Spoke with PIYUSH Price regarding patient continued c/o lightheadedness at times and now statement of \"I just don't feel good; I didn't feel this way yesterday\" following evening round/assessment. Patient states her lochia is still WNL. VS last checked to be WNL. Labs from yesterday morning stable with a WBC of 12. Dressing has no further drainage from this morning. Patient states she is feeling a tightness wrapping from her mid-back to below her breasts and into chest, making it difficult to take deep breaths. Nurse suggested an incentive spirometer or at least attempting to take deep breaths to expand lungs. Also encouraged patient again to get up and ambulate more often and to wear abdominal binder while out of bed. Nurse offered to help if needed. Also encouraged patient again to drink fluids liberally and try to consume more calories. Patient states she is still passing gas. Nurse and CNM then went to patient's room to assess and discuss plan of care. CNM believes discomfort may be r/t gas discomfort. Nurse to give patient Simethicone with pain meds soon. Nurse will also perform a focused head to toe reassessment at that time to confirm any changes from baseline from morning's assessment.   "

## 2025-05-18 NOTE — NURSING NOTE
Nurse entered patient room to give scheduled pain meds. Patient immediately reported to nurse that she felt dizzy and lightheaded. Patient lying at a 30 degree angle in bed resting. States she had recently been standing for a while giving her baby a bath at the sink. Nurse advised her to stay in bed for a while and change position slowly while taking deep breaths. VS taken and WNL. Patient given a full pitcher of water and encouraged to drink fluids liberally. Also given apple juice and encouraged to keep up on meals. Ice pack provided for comfort. Asked the patient to call if condition worsening or new symptoms arise. Will continue to monitor patient for dizziness. Lochia currently stable per patient.      Update 1248:  Patient still slightly dizzy but states improved. Getting ready to take a nap. Discussed possibility of Oxycodone 5mg increasing dizziness but patient decided that pro outweighed the potential con and wanted to take med for pain at this time. PIYUSH Price notified about the entire situation to this point and does not want labs at this time. Agrees with nursing interventions so far. Nurse to continue to monitor at this time. Patient advised to not shower without assist. Said would wait until after FOB returned.

## 2025-05-19 ENCOUNTER — PHARMACY VISIT (OUTPATIENT)
Dept: PHARMACY | Facility: CLINIC | Age: 25
End: 2025-05-19
Payer: MEDICAID

## 2025-05-19 VITALS
DIASTOLIC BLOOD PRESSURE: 72 MMHG | WEIGHT: 160.94 LBS | HEART RATE: 75 BPM | OXYGEN SATURATION: 98 % | RESPIRATION RATE: 18 BRPM | TEMPERATURE: 98.2 F | BODY MASS INDEX: 31.6 KG/M2 | SYSTOLIC BLOOD PRESSURE: 115 MMHG | HEIGHT: 60 IN

## 2025-05-19 LAB
BLOOD EXPIRATION DATE: NORMAL
BLOOD EXPIRATION DATE: NORMAL
DISPENSE STATUS: NORMAL
DISPENSE STATUS: NORMAL
PRODUCT BLOOD TYPE: 7300
PRODUCT BLOOD TYPE: 7300
PRODUCT CODE: NORMAL
PRODUCT CODE: NORMAL
UNIT ABO: NORMAL
UNIT ABO: NORMAL
UNIT NUMBER: NORMAL
UNIT NUMBER: NORMAL
UNIT RH: NORMAL
UNIT RH: NORMAL
UNIT VOLUME: 350
UNIT VOLUME: 350
XM INTEP: NORMAL
XM INTEP: NORMAL

## 2025-05-19 PROCEDURE — RXMED WILLOW AMBULATORY MEDICATION CHARGE

## 2025-05-19 PROCEDURE — 7210000002 HC LABOR PER HOUR

## 2025-05-19 PROCEDURE — 2500000001 HC RX 250 WO HCPCS SELF ADMINISTERED DRUGS (ALT 637 FOR MEDICARE OP): Performed by: OBSTETRICS & GYNECOLOGY

## 2025-05-19 PROCEDURE — 99239 HOSP IP/OBS DSCHRG MGMT >30: CPT | Performed by: ADVANCED PRACTICE MIDWIFE

## 2025-05-19 PROCEDURE — 2500000001 HC RX 250 WO HCPCS SELF ADMINISTERED DRUGS (ALT 637 FOR MEDICARE OP): Performed by: ADVANCED PRACTICE MIDWIFE

## 2025-05-19 RX ORDER — DOCUSATE SODIUM 100 MG/1
100 CAPSULE, LIQUID FILLED ORAL 2 TIMES DAILY PRN
Qty: 30 CAPSULE | Refills: 5 | Status: SHIPPED | OUTPATIENT
Start: 2025-05-19

## 2025-05-19 RX ORDER — FERROUS SULFATE 324(65)MG
325 TABLET, DELAYED RELEASE (ENTERIC COATED) ORAL DAILY
Qty: 90 TABLET | Refills: 0 | Status: SHIPPED | OUTPATIENT
Start: 2025-05-19

## 2025-05-19 RX ORDER — IBUPROFEN 600 MG/1
600 TABLET, FILM COATED ORAL EVERY 6 HOURS
Qty: 56 TABLET | Refills: 0 | Status: SHIPPED | OUTPATIENT
Start: 2025-05-19 | End: 2025-06-02

## 2025-05-19 RX ORDER — CALCIUM CARBONATE 200(500)MG
500 TABLET,CHEWABLE ORAL DAILY
Status: DISCONTINUED | OUTPATIENT
Start: 2025-05-19 | End: 2025-05-19 | Stop reason: HOSPADM

## 2025-05-19 RX ORDER — ACETAMINOPHEN 325 MG/1
975 TABLET ORAL EVERY 6 HOURS
Qty: 168 TABLET | Refills: 0 | Status: SHIPPED | OUTPATIENT
Start: 2025-05-19 | End: 2025-06-02

## 2025-05-19 RX ADMIN — OXYCODONE HYDROCHLORIDE 10 MG: 5 TABLET ORAL at 03:12

## 2025-05-19 RX ADMIN — IBUPROFEN 600 MG: 600 TABLET, FILM COATED ORAL at 04:41

## 2025-05-19 RX ADMIN — ACETAMINOPHEN 975 MG: 325 TABLET, FILM COATED ORAL at 04:41

## 2025-05-19 RX ADMIN — SIMETHICONE 80 MG: 80 TABLET, CHEWABLE ORAL at 04:43

## 2025-05-19 RX ADMIN — IBUPROFEN 600 MG: 600 TABLET, FILM COATED ORAL at 10:46

## 2025-05-19 RX ADMIN — ACETAMINOPHEN 975 MG: 325 TABLET, FILM COATED ORAL at 10:46

## 2025-05-19 RX ADMIN — OXYCODONE HYDROCHLORIDE 10 MG: 5 TABLET ORAL at 12:29

## 2025-05-19 RX ADMIN — CALCIUM CARBONATE (ANTACID) CHEW TAB 500 MG 1 TABLET: 500 CHEW TAB at 11:22

## 2025-05-19 ASSESSMENT — PAIN SCALES - GENERAL
PAINLEVEL_OUTOF10: 7
PAINLEVEL_OUTOF10: 7
PAINLEVEL_OUTOF10: 9
PAINLEVEL_OUTOF10: 3

## 2025-05-19 ASSESSMENT — PAIN DESCRIPTION - DESCRIPTORS: DESCRIPTORS: ACHING;CRAMPING;SORE

## 2025-05-19 NOTE — PROGRESS NOTES
Social Work Assessment       Patient: Argelia Valentine  Address: [address] [facility]  She lives local but this is the address she would like used which is mailing address  Phone: [phone]    Referral Reason: Mental health    Prenatal Care:     [place] Name: Merrill Pradhan  [place] : 23    Other Children: Ms. Valentine has 3 other children    Household Composition: Ms. Valentine lives at home with her children    IPV/DV or Safety Concerns: She reports feeling safe at home    Car-Seat: Yes  Safe Sleep Space: Discussed  Safe Sleep Education: Discussed    Transportation Concerns: No transportation concerns    School/Work/Income: Ms. Valentine is a full time student    Insurance: [facility]    Mental Health Diagnoses: Biplolar, PTSD, Depression  Medication(s): yes current medication taken  Counseling: Sees a therapist 2x week at [facility] and a psychiatrist every 3-4 weeks    Supports: Family support    Substance Use History: She reports no history    Toxicology Screens: n/a    Department of Children and Family Services (DCFS): She reports involvement with DCFS years ago but no current involvement      Assessment: SW met with Ms. Valentine bedside to complete assessment. She was receptive to the conversation. Ms. Valentine has [employer] insurance and is on [employer]. She does not have WIC but SW gave her form in case she would like to make an appt. She uses [facility] for pediatric follow up care. She has a history of mental health and sees a counselor 2x week. She is on medication. She reports no substance use history. She has a car seat for discharge and Safe Sleep at home for .       Plan:  cleared for discharge from a  perspective once medically ready.      Signature: CAROLA Stephenson

## 2025-05-19 NOTE — CARE PLAN
The patient's goals for the shift include dc home    The clinical goals for the shift include pt reports less episodes of dizziness over my shift    Over the shift, the patient did not make progress toward the following goals. Barriers to progression include n/a. Recommendations to address these barriers include n/a.    Patient met goals. Stable for discharge per CNM. RN wheeled pt to car and patient ambulated with stable gait. All questions asked and answered before discharge.

## 2025-05-19 NOTE — DISCHARGE SUMMARY
Discharge Summary    Admission Date: 2025  Discharge Date: 2025    Discharge Diagnosis  S/P     Hospital Course  Delivery Date: 2025 9:24 AM  Delivery type: , Low Transverse   GA at delivery: 38w6d  Outcome: Living  Anesthesia during delivery: Spinal  Intrapartum complications: None  Feeding method: Breastfeeding Status: Yes     Procedures: repeat LTCS  Contraception at discharge: defer to office    Pertinent Physical Exam At Time of Discharge  breasts soft, nontender, L nipple with some breakdown  abdomen non-distended  fundus firm, u/1  Incision under dressing  Lochia scant      Last Vitals:  Temp Pulse Resp BP MAP Pulse Ox   36.2 °C (97.2 °F) 61 18 108/65 73 97 %     Discharge Meds     Your medication list        START taking these medications        Instructions Last Dose Given Next Dose Due   acetaminophen 325 mg tablet  Commonly known as: Tylenol      Take 3 tablets (975 mg) by mouth every 6 hours for 14 days.       docusate sodium 100 mg capsule  Commonly known as: Colace      Take 1 capsule (100 mg) by mouth 2 times a day as needed for constipation.       ferrous sulfate 325 (65 Fe) mg EC tablet      Take 1 tablet by mouth once daily. Do not crush, chew, or split.       ibuprofen 600 mg tablet      Take 1 tablet (600 mg) by mouth every 6 hours for 14 days.              CHANGE how you take these medications        Instructions Last Dose Given Next Dose Due   lurasidone 20 mg tablet  Commonly known as: Latuda  What changed: See the new instructions.                  CONTINUE taking these medications        Instructions Last Dose Given Next Dose Due   albuterol 90 mcg/actuation inhaler      Inhale 2 puffs every 4 hours if needed for wheezing.       Prenatal Vitamin 27 mg iron- 0.8 mg tablet  Generic drug: prenatal vitamin (iron-folic)                  STOP taking these medications      aspirin 81 mg chewable tablet        doxylamine 25 mg tablet  Commonly known as: Unisom       "            Where to Get Your Medications        These medications were sent to [facility]  [address], [facility] [address]      Hours: 8 AM to 6 PM Mon-Fri, 9 AM to 1 PM Saturday Phone: [phone]   acetaminophen 325 mg tablet  docusate sodium 100 mg capsule  ferrous sulfate 325 (65 Fe) mg EC tablet  ibuprofen 600 mg tablet          Complications Requiring Follow-Up  - Anemia secondary to acute blood loss --> discharge on PO iron   - Noted lightheadedness and chest pain yesterday that has resolved today. VSS, anemic though currently asymptomatic   - Mental health history \"multiple diagnoses\" (depression, anxiety, bipolar disorder) has psychiatrist and therapist, reports apmt w/ psychiatrist late this week. Plans to restart Latuda under guidance of psychiatrist which she discontinued during pregnancy. Reviewed warning signs for PP mental health exacerbation including increased risk of PP psychosis due to bipolar diagnosis. Reviewed to call or present to ED immediately with thoughts of harm to self or others. Social work consult ordered prior to discharge.  - Chronic HTN, has BP cuff at home. Will continue to take daily at home. Reviewed preeclampsia warning signs and when/how to call. For BP check in office later this week.     Test Results Pending At Discharge  Pending Labs       Order Current Status    Surgical Pathology Exam - PLACENTA Collected (05/16/25 1000)            Outpatient Follow-Up  No future appointments.    I spent 30 minutes in the professional and overall care of this patient.      JOSH Dalton-PIYUSH  "

## 2025-05-21 LAB
LABORATORY COMMENT REPORT: NORMAL
PATH REPORT.FINAL DX SPEC: NORMAL
PATH REPORT.GROSS SPEC: NORMAL
PATH REPORT.RELEVANT HX SPEC: NORMAL
PATH REPORT.TOTAL CANCER: NORMAL

## 2025-05-22 ENCOUNTER — OFFICE VISIT (OUTPATIENT)
Dept: OBSTETRICS AND GYNECOLOGY | Facility: CLINIC | Age: 25
End: 2025-05-22
Payer: COMMERCIAL

## 2025-05-22 VITALS
WEIGHT: 153 LBS | HEIGHT: 60 IN | BODY MASS INDEX: 30.04 KG/M2 | SYSTOLIC BLOOD PRESSURE: 104 MMHG | DIASTOLIC BLOOD PRESSURE: 65 MMHG

## 2025-05-22 DIAGNOSIS — Z48.89 POSTOPERATIVE VISIT: Primary | ICD-10-CM

## 2025-05-22 PROBLEM — O21.0 HYPEREMESIS GRAVIDARUM (HHS-HCC): Status: RESOLVED | Noted: 2024-09-30 | Resolved: 2025-05-16

## 2025-05-22 PROBLEM — O10.919 CHRONIC HYPERTENSION IN PREGNANCY (HHS-HCC): Status: RESOLVED | Noted: 2024-10-08 | Resolved: 2025-05-16

## 2025-05-22 PROBLEM — O99.511 ASTHMA AFFECTING PREGNANCY IN FIRST TRIMESTER (HHS-HCC): Status: RESOLVED | Noted: 2023-09-05 | Resolved: 2025-05-16

## 2025-05-22 PROBLEM — J45.909 ASTHMA AFFECTING PREGNANCY IN FIRST TRIMESTER (HHS-HCC): Status: RESOLVED | Noted: 2023-09-05 | Resolved: 2025-05-16

## 2025-05-22 PROCEDURE — 3008F BODY MASS INDEX DOCD: CPT | Performed by: OBSTETRICS & GYNECOLOGY

## 2025-05-22 PROCEDURE — 99024 POSTOP FOLLOW-UP VISIT: CPT | Performed by: OBSTETRICS & GYNECOLOGY

## 2025-05-22 RX ORDER — OXYCODONE HYDROCHLORIDE 5 MG/1
5 TABLET ORAL EVERY 6 HOURS PRN
Qty: 15 TABLET | Refills: 0 | Status: SHIPPED | OUTPATIENT
Start: 2025-05-22

## 2025-05-22 RX ORDER — VIT C/E/ZN/COPPR/LUTEIN/ZEAXAN 250MG-90MG
10000 CAPSULE ORAL
Qty: 13 CAPSULE | Refills: 3 | Status: SHIPPED | OUTPATIENT
Start: 2025-05-25 | End: 2026-05-25

## 2025-05-22 ASSESSMENT — ENCOUNTER SYMPTOMS
ENDOCRINE NEGATIVE: 0
ALLERGIC/IMMUNOLOGIC NEGATIVE: 0
MUSCULOSKELETAL NEGATIVE: 0
HEMATOLOGIC/LYMPHATIC NEGATIVE: 0
CARDIOVASCULAR NEGATIVE: 0
CONSTITUTIONAL NEGATIVE: 0
GASTROINTESTINAL NEGATIVE: 0
EYES NEGATIVE: 0
NEUROLOGICAL NEGATIVE: 0
PSYCHIATRIC NEGATIVE: 0
RESPIRATORY NEGATIVE: 0

## 2025-05-22 ASSESSMENT — PAIN SCALES - GENERAL: PAINLEVEL_OUTOF10: 0-NO PAIN

## 2025-05-22 NOTE — PROGRESS NOTES
Postpartum Progress Note    Assessment/Plan   Argelia Valentine is a 25 y.o., , who delivered at 38w6d gestation and is now postpartum day 6.    Patient is 1 week post  section.  Still with some pain and discomfort.  Feels some swelling and pain in her back at the site of the spinal.  Some radiation down the leg.    Minimal lochia.    Taking Motrin and Tylenol.  Moments of difficult pain control and would like oxycodone.    Referred to physical therapy for back pain    Follow-up in 2 weeks  Assessment & Plan  Postpartum exam ([facility])    Postoperative visit    31w2d Problems (from 24 to 25)       Problem Noted Diagnosed Resolved    Chronic hypertension in pregnancy ([facility]) 10/8/2024 by Delon Long MD  2025 by Vero Farrell MA    Priority:  High       Overview Addendum 3/17/2025 10:10 AM by Eric Navarro MD   Not on meds  Begin   Stable blood pressure no treatment necessary         36 weeks gestation of pregnancy ([facility]) 10/8/2024 by Delon Long MD  2025 by Sathya Jones MD    Priority:  High       Overview Addendum 2025 10:01 AM by Delon Long MD   Desired provider in labor: [] CNM  [] Physician  [x] Blood Products: [x] Yes, accepts [] No, needs counseling  [x] Initial BMI: Could not be calculated   [x] Prenatal Labs:   [x] Cervical Cancer Screening up to date  [x] Rh status: b+  [x] Genetic Screening:  Ordered today  [x] NT US: (11-13 wks)  [x] Baby ASA (if indicated):  [] Pregnancy dated by:     [x] Anatomy US: (19-20 wks) WNL  [x] Federal Sterilization consent signed (if indicated): declines  [x] 1hr GCT at 24-28wks: WNL  [x] Rhogam (if indicated): NA  [x] Fetal Surveillance (if indicated): weeekly NST due to cHTN no meds  [x] Tdap (27-32 wks, may be given up to 36 wks if initial window missed):   [x] RSV (32-36 wks) (Sept. to end of ):   [x] Flu Vaccine: declines    [x] Breastfeeding: Breast and formula  [] Postpartum Birth  control method: Undecided  [x] GBS at 36 - 37 wks:  [x] 39 weeks discussion of IOL vs. Expectant management: N/A  [x] Mode of delivery ( anticipated ): RCS at 38 weeks           Threatened miscarriage ([facility]) 9/30/2024 by Eric Navarro MD  10/8/2024 by Delon Long MD    Priority:  Medium       Anembryonic pregnancy ([facility]) 9/30/2024 by Eric Navarro MD  4/25/2025 by Delon Long MD    Priority:  Medium       Overview Signed 9/30/2024 10:01 AM by Eric Navarro MD   No fetal pole on 9 week preg  Repeat U/S  Follow up 1 week         Hyperemesis gravidarum ([facility]) 9/30/2024 by Eric Navarro MD  5/16/2025 by Vero Farrell MA    Priority:  Medium       Overview Signed 9/30/2024 10:02 AM by Eric Navarro MD   Unison and B6  Pressure sea bands         Asthma affecting pregnancy in first trimester ([facility]) 9/5/2023 by Calixto Pizano MA  5/16/2025 by Vero Farrell MA    Priority:  Medium       Overview Signed 10/8/2024  1:21 PM by Delon Long MD   On albuterol         Poor fetal growth affecting management of mother in third trimester ([facility]) 11/9/2016 by Calixto Pizano MA  5/16/2025 by Vero Farrell MA    Priority:  Medium             Hospital course: no complications       Subjective   Her pain is well controlled with current medications  She is passing flatus  She is ambulating well  She is tolerating a No diet orders on file  She reports no breast or nursing problems  She denies emotional concerns today   Her plan for contraception is none         Objective   Allergies:   Sumatriptan         Last Vitals:  Temp Pulse Resp BP MAP Pulse Ox         104/65         Vitals Min/Max Last 24 Hours:  @FLOWSTAT(6,8,9,5,559055:24::1)@    Intake/Output:   [unfilled]    Physical Exam:  Abdomen is soft nondistended bowel sounds positive.  Incision is clean dry and intact    Back with mild amount of edema at her spinal regional anesthesia site.  No blood collection.   Nontender.    Extremities nontender no edema    Lab Data:

## 2025-05-23 ENCOUNTER — APPOINTMENT (OUTPATIENT)
Dept: OBSTETRICS AND GYNECOLOGY | Facility: CLINIC | Age: 25
End: 2025-05-23
Payer: COMMERCIAL

## 2025-06-03 ENCOUNTER — TELEPHONE (OUTPATIENT)
Dept: OBSTETRICS AND GYNECOLOGY | Facility: CLINIC | Age: 25
End: 2025-06-03
Payer: COMMERCIAL

## 2025-06-04 DIAGNOSIS — Z00.00 HEALTHCARE MAINTENANCE: Primary | ICD-10-CM

## 2025-06-04 RX ORDER — B-COMPLEX WITH VITAMIN C
1 TABLET ORAL
Qty: 30 TABLET | Refills: 1 | Status: SHIPPED | OUTPATIENT
Start: 2025-06-04

## 2025-06-04 NOTE — TELEPHONE ENCOUNTER
Patient verified name and date of birth at start of call and was made aware that her refill order was sent to Dr. Navarro and once he signs it will be available at her [facility] pharmacy on [address]. Patient verbalized understanding and had no further questions, comments or concerns at this time.    PERLA Nieves

## 2025-06-05 ENCOUNTER — LACTATION CONSULT (OUTPATIENT)
Dept: LACTATION | Facility: CLINIC | Age: 25
End: 2025-06-05
Payer: COMMERCIAL

## 2025-06-05 ENCOUNTER — APPOINTMENT (OUTPATIENT)
Dept: OBSTETRICS AND GYNECOLOGY | Facility: CLINIC | Age: 25
End: 2025-06-05
Payer: COMMERCIAL

## 2025-06-05 VITALS
BODY MASS INDEX: 25.87 KG/M2 | DIASTOLIC BLOOD PRESSURE: 72 MMHG | WEIGHT: 146 LBS | SYSTOLIC BLOOD PRESSURE: 109 MMHG | HEIGHT: 63 IN

## 2025-06-05 DIAGNOSIS — F43.25 ADJUSTMENT DISORDER WITH MIXED DISTURBANCE OF EMOTIONS AND CONDUCT: ICD-10-CM

## 2025-06-05 DIAGNOSIS — Z98.891 S/P C-SECTION: ICD-10-CM

## 2025-06-05 DIAGNOSIS — O92.4 DECREASED MILK PRODUCTION (HHS-HCC): Primary | ICD-10-CM

## 2025-06-05 DIAGNOSIS — O92.70 LACTATION DISORDER (HHS-HCC): Primary | ICD-10-CM

## 2025-06-05 PROCEDURE — 99211 OFF/OP EST MAY X REQ PHY/QHP: CPT | Performed by: OBSTETRICS & GYNECOLOGY

## 2025-06-05 PROCEDURE — 3008F BODY MASS INDEX DOCD: CPT | Performed by: OBSTETRICS & GYNECOLOGY

## 2025-06-05 PROCEDURE — 99214 OFFICE O/P EST MOD 30 MIN: CPT | Performed by: OBSTETRICS & GYNECOLOGY

## 2025-06-05 RX ORDER — METOCLOPRAMIDE 10 MG/1
10 TABLET ORAL ONCE AS NEEDED
Qty: 80 TABLET | Refills: 2 | Status: SHIPPED | OUTPATIENT
Start: 2025-06-05 | End: 2025-06-15

## 2025-06-05 ASSESSMENT — EDINBURGH POSTNATAL DEPRESSION SCALE (EPDS)
THINGS HAVE BEEN GETTING ON TOP OF ME: YES, SOMETIMES I HAVEN'T BEEN COPING AS WELL AS USUAL
I HAVE LOOKED FORWARD WITH ENJOYMENT TO THINGS: RATHER LESS THAN I USED TO
I HAVE BEEN ABLE TO LAUGH AND SEE THE FUNNY SIDE OF THINGS: NOT AT ALL
I HAVE BEEN SO UNHAPPY THAT I HAVE BEEN CRYING: YES, MOST OF THE TIME
I HAVE FELT SAD OR MISERABLE: YES, MOST OF THE TIME
I HAVE BLAMED MYSELF UNNECESSARILY WHEN THINGS WENT WRONG: NO, NEVER
THE THOUGHT OF HARMING MYSELF HAS OCCURRED TO ME: NEVER
I HAVE BEEN SO UNHAPPY THAT I HAVE HAD DIFFICULTY SLEEPING: NOT VERY OFTEN
I HAVE BEEN ANXIOUS OR WORRIED FOR NO GOOD REASON: YES, VERY OFTEN
TOTAL SCORE: 18
I HAVE FELT SCARED OR PANICKY FOR NO GOOD REASON: YES, SOMETIMES

## 2025-06-05 ASSESSMENT — ENCOUNTER SYMPTOMS
GASTROINTESTINAL NEGATIVE: 0
CARDIOVASCULAR NEGATIVE: 0
HEMATOLOGIC/LYMPHATIC NEGATIVE: 0
RESPIRATORY NEGATIVE: 0
MUSCULOSKELETAL NEGATIVE: 0
OCCASIONAL FEELINGS OF UNSTEADINESS: 0
DEPRESSION: 0
EYES NEGATIVE: 0
CONSTITUTIONAL NEGATIVE: 0
ENDOCRINE NEGATIVE: 0
ALLERGIC/IMMUNOLOGIC NEGATIVE: 0
NEUROLOGICAL NEGATIVE: 0
PSYCHIATRIC NEGATIVE: 0
LOSS OF SENSATION IN FEET: 0

## 2025-06-05 ASSESSMENT — PAIN SCALES - GENERAL: PAINLEVEL_OUTOF10: 0-NO PAIN

## 2025-06-05 NOTE — PROGRESS NOTES
Postpartum Progress Note    Assessment/Plan   Argelia Valentine is a 25 y.o., , who delivered at Unknown gestation and is now postpartum day .    20 days postpartum.  Complaints of poor breast milk volume.  Frustrated.  Would like to exclusively breast-feed.  Also feeling a little off dizzy.  Has previous been on Latuda.  Advised to restart and she is hesitant.  Anxiety depression and bipolar by patient history    Check CBC for complaints of dizziness  Encouraged to restart Latuda  Metoclopramide 4 times a day for milk letdown.  Encouraged to reach out to lactation    Still with some vaginal bleeding but not heavy.    Abdominal pain mild.    Subjective   Her pain is well controlled with current medications  She is passing flatus  She is ambulating well  She is tolerating a No diet orders on file  She reports poor milk letdown  She denies emotional concerns and reports emotional concerns about has seen her counselor and advised to restart Latuda today   Her plan for contraception is none     No current plan    Objective     Last Vitals:  Temp Pulse Resp BP MAP Pulse Ox         109/72         Vitals Min/Max Last 24 Hours:  @FLOWSTAT(6,8,9,5,534829:24::1)@    Intake/Output:   [unfilled]    Physical Exam:  Abd: soft  Fundus: firm  Ext: NT no edema  Incision: C/D/I  Bimanual exam still with some dark blood in the vault.  Uterus is well involuted.  Cervix is closed.  No adnexal masses    Chaperone Present: Yes.  Chaperone Name/Title: PA student    Lab Data:  Lab Results   Component Value Date    WBC 12.0 (H) 2025    HGB 9.0 (L) 2025    HCT 28.4 (L) 2025     2025

## 2025-06-05 NOTE — PATIENT INSTRUCTIONS
PATIENT DISCUSSION SUMMARY:  .  Mother and infant seen for concerns over ongoing latch pain and infant wanting to feed every 36-46 minutes.  Mother feeling like milk supply has significantly decreased.  Mother has been primarily pumping and feeding expressed milk because latching is so painful.    Mother has been feeding on demand.   Mother's milk in and milk easily expressible.      Infant weight gain  adequate gaining 19 g per day since last pediatric visit.    Infant alert with moist mucous membranes.      Mother's nipples erect and breasts soft,.  Infant able to sustain latch but mother experiencing significant pain during feeding and nipples flattened/lipstick shaped after feeding.Infant retracting and losing latch intermittently, with audible slurp and loss of suction/latch.      Suck assessment abnormal: reveals infant with tight maxillary frenulum, and curls upper lip, but lip able to be everted.  Infant with good cupping and extension of tongue, with posterior humping of tongue, and moderate lateralization at this exam.  Infant not noted to elevate tongue past mid mouth on this exam. Infant with periodic retraction and biting. Palate high and intact to palpation.    Discussed to bottle feed with more upright positioning and migdalia lips to help infant have wide latch when using bottle by pulling down on chin and everting upper lip.    Mother instructed on suck training exercises.    Pumping session observed with Sand 9mee     pump with 24     mm flanges.  Discussed increasing vacuum to comfort, use of massage and compression, and hand expression after pumping to improve breast emptying.  Mother shown technique for hand expression. Pumping out approximately  90 ml in20    minutes.    Reviewed post birth warning signs and safe sleep    Plan:  Mother to do suck training exercises prior to every feed.  Feed at least 8-12 times daily, both breasts for as long as infant is actively sucking and swallowing.  Use massage  and compression to aid transfer.      At least 8 times daily, mother to pump both breasts at same time for 20 minutes, using massage and compression, with hand expression after to fully drain breast. Feed expressed milk to infant until satisfied.    If suck training exercises do not improve latch, infant needs to be evaluated by ENT or pediatric dentist for tight posterior frenulum, and to evaluate maxillary frenulum.    Will f/u with lactation as needed and with pediatrician.    Denies questions.  LACTATION PROBLEMS AND STRATEGIES:  Problems latching baby to breast: Baby should latch to areola (dark area) not just the nipple.  Baby's lips should be flanged outward.  Bring the baby up to the level of your breast by putting a pillow under the baby.  Encourage a deeper latch with the asymetrical latch- lining baby's nose opposite mother's nipple.  Gently tickle your baby's lip with your nipple to encourage your baby to open his/her mouth wide.  Hold baby so that your breast is positioned deep in the baby's mouth.  Hold your baby close, tummy to tummy.  If baby does not latch to breast, express breast milk.  If the baby is not latched on well, break seal and gently try again.  Keep baby skin to skin and watch for feeding cues.  Massage breast to start milk-ejection reflex.  Place nipple and at least 1 inch of areola into baby's mouth.  Place your hand behind the baby's neck and shoulder.  Do not force baby's head into breast.  Put baby in the football hold or clutch hold, supporting his/her neck and head in sniffing position to open his/her throat.  Shape breast into oval shape (sandwich hold)  for deep latch.  Try different feeding positions (cradle, football, side etc.).  Use a breast feeding pillow to bring baby up to the level of the breast.  Use semi-reclined feeding position to allow baby to take an active role and trigger baby's inborn feeding behavior.  Use your hand to support your breast during feeding.  When  "your baby's mouth is wide open, quickly pull baby into your breast.  Your baby's chin should be pressed into your breast.  Pumping pointers: Air dry nipples, express milk and rub in or use an approved nipple cream after expressing., Apply heat to breasts before pumping., Choose a familiar comfortable place to express milk., If nipples are sore use less pressure and pump more frequently for shorter times., Listen to a tape of baby while pumping., Look at a photo of baby wile expressing., Massage breasts before and during pumping., Minimize distractions., Moisten flange before beginning to improve seal., Relax for 5 minutes before pumping., Stimulate the nipples and hand express a few drops before pumping., and Use pumping bra/band for \"hands free\" pumping..    PATIENT INSTRUCTION HANDOUTS GIVEN:   Tips for pumping with an electric breast pump and milk storage for your healthy baby (PI# 123)  Suck training (PI# 176)  How to keep your breast pump kit clean  Foods that promote good milk production  Breastfeeding: Tips to increase your milk supply (PI# 162)  Breastfeeding: Sore and cracked nipples (PI# 167)  Breastfeeding: Plugged milk ducts/mastitis (PI# 164)  Breast massage with milk expression by hand (PI# 728)  LACTATION EDUCATION:  Correct Positioning, Correct Latch On, and Demo use of Pump    "

## 2025-06-05 NOTE — PROGRESS NOTES
Lactation Counseling Note    Subjective:    Argelia Valentine is a 25 y.o. patient referred for lactation counseling. She is here today due to Sore/cracked nipple(s), Low milk supply, Breast pumping concerns/issues, and Latch issues. She was referred by her self.     OB HISTORY:   Healthcare Provider: [facility]  Prenatal Screening: Abnormal  GHTN, anemai with iron infusions  Maternal Blood Type: B positive  /Para: : 6  Para: 4  Weeks Gestation: 39  Mode of Delivery: Repeat  section  spinal  Delivery Complications: None  Maternal Complications: None  [place] Information: Baby's Name: Wes Pradhan  : 25  MRN: [id number]  Place of Birth: [place]  Healthcare Provider: Anay  APGARS: 1 minute: 7  5 minutes: 7  10 minutes 9  Skin to skin contact: Delayed in recovery  Infant's blood type: not tested  Birth parameters: SGA  Birth weight: 2720 gm  Birth length: 49 cm  Discharge weight: 2539 gm  Complications: painful latching , 8 % weigh losst  No other concerns    Objective:    BREASTFEEDING ASSESSMENT:   Breast Assessment: Medium, Symmetrical, Soft, and Compressible  Nipple Assessment/Stage: erect, rounded after feeding, scabbed, and sore Stage II - Abrasions, shallow crack or fissure, stripe  Areola: Normal  Feeding Position: cradle, skin to skin, both sides, nipple to nose, and mother demonstrates good positioning  Latch: minimal assistance is needed, instructed on deep latch, cries while latching, shallow latch, mouth not open wide enough, latch is painful, pain during feeding, chin and lower lip contact breast first, bursts of sucking, swallowing, and rest, upper lip turned in, lower lip turned in, flanged lips, chin moves in rhythmic motion, clenched jaws, and nipple - slurping/pulls/nibbles  Suck/Feeding: sustained, coordinated suck/swallow/breathe, clenching/biting, tactile stimulation needed, audible swallowing, and cheeks dimple during sucking  Alternative Feeding Methods: nursing at  the breast, feeding expressed breast milk, and paced bottle  Feeding and Cleaning instructions reviewed for: Paced bottle  Infant Feeding Method: Breast milk only  Infant Behavior: awake, fussy, readiness to feed, feeding cues observed, rooting response, and sucking  Infant Information: Palate- high/arch/bubble/normal  Poor occlusion of lips around areola  Tongue protrudes over alveolar ridge  Tongue humped/bunched/retracted/elevated  Good cupping of tongue  Good lateral movement of the tongue  Fontanel: flat  Mucous Membranes: moist  Breast Pain: Pain scale 0-10 (10 most pain): 0  Nipple Pain: Pain scale 0-10 (10 most pain): 8  Pain description: bite, pinch  Before feeding pain 0-10 (10 most pain): 7  After adjustment pain 0-10 (10 most pain): 5  Other pain relief methods: lanolin  Earth mama nipple balm  Weight: Reported pediatrician visit weight: 2812 gm  Date of pediatrician weight: 3025  Weight before feedin gm  Weight after feedin gm  Amount of breast milk transferred: 63 ml  Number of voids in the last 24 hours: 2  Number of stools in the last 24 hours: 4  No other concerns      PATIENT DISCUSSION SUMMARY:  .  Mother and infant seen for concerns over ongoing latch pain and infant wanting to feed every 36-46 minutes.  Mother feeling like milk supply has significantly decreased.  Mother has been primarily pumping and feeding expressed milk because latching is so painful.    Mother has been feeding on demand.   Mother's milk in and milk easily expressible.      Infant weight gain  adequate gaining 19 g per day since last pediatric visit.    Infant alert with moist mucous membranes.      Mother's nipples erect and breasts soft,.  Infant able to sustain latch but mother experiencing significant pain during feeding and nipples flattened/lipstick shaped after feeding.Infant retracting and losing latch intermittently, with audible slurp and loss of suction/latch.      Suck assessment abnormal: reveals  infant with tight maxillary frenulum, and curls upper lip, but lip able to be everted.  Infant with good cupping and extension of tongue, with posterior humping of tongue, and moderate lateralization at this exam.  Infant not noted to elevate tongue past mid mouth on this exam. Infant with periodic retraction and biting. Palate high and intact to palpation.    Discussed to bottle feed with more upright positioning and migdalia lips to help infant have wide latch when using bottle by pulling down on chin and everting upper lip.    Mother instructed on suck training exercises.    Pumping session observed with HEMINGWAYmee     pump with 24     mm flanges.  Discussed increasing vacuum to comfort, use of massage and compression, and hand expression after pumping to improve breast emptying.  Mother shown technique for hand expression. Pumping out approximately  90 ml in20    minutes.    Reviewed post birth warning signs and safe sleep    Plan:  Mother to do suck training exercises prior to every feed.  Feed at least 8-12 times daily, both breasts for as long as infant is actively sucking and swallowing.  Use massage and compression to aid transfer.      At least 8 times daily, mother to pump both breasts at same time for 20 minutes, using massage and compression, with hand expression after to fully drain breast. Feed expressed milk to infant until satisfied.    If suck training exercises do not improve latch, infant needs to be evaluated by ENT or pediatric dentist for tight posterior frenulum, and to evaluate maxillary frenulum.    Will f/u with lactation as needed and with pediatrician.    Denies questions.  LACTATION PROBLEMS AND STRATEGIES:  Problems latching baby to breast: Baby should latch to areola (dark area) not just the nipple.  Baby's lips should be flanged outward.  Bring the baby up to the level of your breast by putting a pillow under the baby.  Encourage a deeper latch with the asymetrical latch- lining baby's nose  "opposite mother's nipple.  Gently tickle your baby's lip with your nipple to encourage your baby to open his/her mouth wide.  Hold baby so that your breast is positioned deep in the baby's mouth.  Hold your baby close, tummy to tummy.  If baby does not latch to breast, express breast milk.  If the baby is not latched on well, break seal and gently try again.  Keep baby skin to skin and watch for feeding cues.  Massage breast to start milk-ejection reflex.  Place nipple and at least 1 inch of areola into baby's mouth.  Place your hand behind the baby's neck and shoulder.  Do not force baby's head into breast.  Put baby in the football hold or clutch hold, supporting his/her neck and head in sniffing position to open his/her throat.  Shape breast into oval shape (sandwich hold)  for deep latch.  Try different feeding positions (cradle, football, side etc.).  Use a breast feeding pillow to bring baby up to the level of the breast.  Use semi-reclined feeding position to allow baby to take an active role and trigger baby's inborn feeding behavior.  Use your hand to support your breast during feeding.  When your baby's mouth is wide open, quickly pull baby into your breast.  Your baby's chin should be pressed into your breast.  Pumping pointers: Air dry nipples, express milk and rub in or use an approved nipple cream after expressing., Apply heat to breasts before pumping., Choose a familiar comfortable place to express milk., If nipples are sore use less pressure and pump more frequently for shorter times., Listen to a tape of baby while pumping., Look at a photo of baby wile expressing., Massage breasts before and during pumping., Minimize distractions., Moisten flange before beginning to improve seal., Relax for 5 minutes before pumping., Stimulate the nipples and hand express a few drops before pumping., and Use pumping bra/band for \"hands free\" pumping..    PATIENT INSTRUCTION HANDOUTS GIVEN:   Tips for pumping with " an electric breast pump and milk storage for your healthy baby (PI# 123)  Suck training (PI# 176)  How to keep your breast pump kit clean  Foods that promote good milk production  Breastfeeding: Tips to increase your milk supply (PI# 162)  Breastfeeding: Sore and cracked nipples (PI# 167)  Breastfeeding: Plugged milk ducts/mastitis (PI# 164)  Breast massage with milk expression by hand (PI# 728)  LACTATION EDUCATION:  Correct Positioning, Correct Latch On, and Demo use of Pump

## 2025-06-05 NOTE — Clinical Note
Patient has adequate milk supply but baby has poor transfer, so metoclopramide is not needed at this time, as mother can pump 3 oz when using pump on proper expression mode.

## 2025-06-06 ENCOUNTER — TELEPHONE (OUTPATIENT)
Dept: LACTATION | Facility: CLINIC | Age: 25
End: 2025-06-06
Payer: COMMERCIAL

## 2025-06-06 ENCOUNTER — APPOINTMENT (OUTPATIENT)
Dept: LACTATION | Facility: CLINIC | Age: 25
End: 2025-06-06
Payer: COMMERCIAL

## 2025-06-06 NOTE — LACTATION NOTE
Follow up phone call for lactation visit. Per patient has been doing suck training. Latch still painful but not quite as bad. Has been pumping with Zomee pump and starting to get more milk out.  Plans to seek additional evaluation of infant.  Will follow up with lactation as needed.  Denies questions.

## 2025-06-08 DIAGNOSIS — Z59.819 HOUSING INSTABILITY: Primary | ICD-10-CM

## 2025-06-08 SDOH — ECONOMIC STABILITY - HOUSING INSECURITY: HOUSING INSTABILITY UNSPECIFIED: Z59.819

## 2025-06-13 SDOH — ECONOMIC STABILITY: TRANSPORTATION INSECURITY: IN THE PAST 12 MONTHS, HAS LACK OF TRANSPORTATION KEPT YOU FROM MEDICAL APPOINTMENTS OR FROM GETTING MEDICATIONS?: NO

## 2025-06-13 SDOH — HEALTH STABILITY: PHYSICAL HEALTH: ON AVERAGE, HOW MANY DAYS PER WEEK DO YOU ENGAGE IN MODERATE TO STRENUOUS EXERCISE (LIKE A BRISK WALK)?: 0 DAYS

## 2025-06-13 SDOH — HEALTH STABILITY: MENTAL HEALTH: HOW OFTEN DO YOU HAVE A DRINK CONTAINING ALCOHOL?: NEVER

## 2025-06-13 SDOH — SOCIAL STABILITY: SOCIAL INSECURITY: WITHIN THE LAST YEAR, HAVE YOU BEEN AFRAID OF YOUR PARTNER OR EX-PARTNER?: NO

## 2025-06-13 SDOH — SOCIAL STABILITY: SOCIAL NETWORK: HOW OFTEN DO YOU ATTEND CHURCH OR RELIGIOUS SERVICES?: NEVER

## 2025-06-13 SDOH — HEALTH STABILITY: MENTAL HEALTH: HOW MANY DRINKS CONTAINING ALCOHOL DO YOU HAVE ON A TYPICAL DAY WHEN YOU ARE DRINKING?: PATIENT DOES NOT DRINK

## 2025-06-13 SDOH — SOCIAL STABILITY: SOCIAL INSECURITY: ARE YOU MARRIED, WIDOWED, DIVORCED, SEPARATED, NEVER MARRIED, OR LIVING WITH A PARTNER?: NEVER MARRIED

## 2025-06-13 SDOH — SOCIAL STABILITY: SOCIAL NETWORK: IN A TYPICAL WEEK, HOW MANY TIMES DO YOU TALK ON THE PHONE WITH FAMILY, FRIENDS, OR NEIGHBORS?: TWICE A WEEK

## 2025-06-13 SDOH — ECONOMIC STABILITY: FOOD INSECURITY: WITHIN THE PAST 12 MONTHS, YOU WORRIED THAT YOUR FOOD WOULD RUN OUT BEFORE YOU GOT THE MONEY TO BUY MORE.: NEVER TRUE

## 2025-06-13 SDOH — ECONOMIC STABILITY: FOOD INSECURITY: WITHIN THE PAST 12 MONTHS, THE FOOD YOU BOUGHT JUST DIDN'T LAST AND YOU DIDN'T HAVE MONEY TO GET MORE.: NEVER TRUE

## 2025-06-13 SDOH — SOCIAL STABILITY: SOCIAL NETWORK: HOW OFTEN DO YOU ATTEND MEETINGS OF THE CLUBS OR ORGANIZATIONS YOU BELONG TO?: NEVER

## 2025-06-13 SDOH — HEALTH STABILITY: MENTAL HEALTH: HOW OFTEN DO YOU HAVE SIX OR MORE DRINKS ON ONE OCCASION?: NEVER

## 2025-06-13 SDOH — HEALTH STABILITY: MENTAL HEALTH
DO YOU FEEL STRESS - TENSE, RESTLESS, NERVOUS, OR ANXIOUS, OR UNABLE TO SLEEP AT NIGHT BECAUSE YOUR MIND IS TROUBLED ALL THE TIME - THESE DAYS?: TO SOME EXTENT

## 2025-06-13 SDOH — SOCIAL STABILITY: SOCIAL INSECURITY: WITHIN THE LAST YEAR, HAVE YOU BEEN HUMILIATED OR EMOTIONALLY ABUSED IN OTHER WAYS BY YOUR PARTNER OR EX-PARTNER?: NO

## 2025-06-13 SDOH — SOCIAL STABILITY: SOCIAL NETWORK
DO YOU BELONG TO ANY CLUBS OR ORGANIZATIONS SUCH AS CHURCH GROUPS, UNIONS, FRATERNAL OR ATHLETIC GROUPS, OR SCHOOL GROUPS?: NO

## 2025-06-13 SDOH — HEALTH STABILITY: MENTAL HEALTH: ON AVERAGE, HOW MANY MINUTES DO YOU ENGAGE IN EXERCISE AT THIS LEVEL?: 0 MIN

## 2025-06-13 SDOH — ECONOMIC STABILITY: GENERAL: HOW HARD IS IT FOR YOU TO PAY FOR THE VERY BASICS LIKE FOOD, HOUSING, MEDICAL CARE, AND HEATING?: SOMEWHAT HARD

## 2025-06-13 SDOH — SOCIAL STABILITY: SOCIAL NETWORK: HOW OFTEN DO YOU GET TOGETHER WITH FRIENDS OR RELATIVES?: NEVER

## 2025-06-13 ASSESSMENT — LIFESTYLE VARIABLES
AUDIT-C TOTAL SCORE: 0
SKIP TO QUESTIONS 9-10: 1

## 2025-06-13 ASSESSMENT — ACTIVITIES OF DAILY LIVING (ADL): LACK_OF_TRANSPORTATION: NO

## 2025-06-13 NOTE — PROGRESS NOTES
Food Insecurity: No Food Insecurity (6/13/2025)    Hunger Vital Sign     Worried About Running Out of Food in the Last Year: Never true     Ran Out of Food in the Last Year: Never true     CHW phoned pt to speak with her concerning her [facility] referral for housing. CHW explained to pt she could call [facility] [phone] for housing assistance. CHW also gave pt the number [employer] [phone] to assist her with help for her other children. Pt will phone CHW if she need further assistance.

## 2025-06-24 ENCOUNTER — HOSPITAL ENCOUNTER (OUTPATIENT)
Facility: HOSPITAL | Age: 25
Discharge: HOME | End: 2025-06-24
Attending: OBSTETRICS & GYNECOLOGY | Admitting: OBSTETRICS & GYNECOLOGY
Payer: COMMERCIAL

## 2025-06-24 ENCOUNTER — TELEPHONE (OUTPATIENT)
Dept: OBSTETRICS AND GYNECOLOGY | Facility: CLINIC | Age: 25
End: 2025-06-24

## 2025-06-24 ENCOUNTER — TELEPHONE (OUTPATIENT)
Dept: OBSTETRICS AND GYNECOLOGY | Facility: HOSPITAL | Age: 25
End: 2025-06-24
Payer: COMMERCIAL

## 2025-06-24 VITALS
OXYGEN SATURATION: 99 % | RESPIRATION RATE: 14 BRPM | DIASTOLIC BLOOD PRESSURE: 56 MMHG | TEMPERATURE: 97.2 F | HEART RATE: 69 BPM | SYSTOLIC BLOOD PRESSURE: 107 MMHG

## 2025-06-24 LAB
ABO GROUP (TYPE) IN BLOOD: NORMAL
ANTIBODY SCREEN: NORMAL
BASOPHILS # BLD AUTO: 0.01 X10*3/UL (ref 0–0.1)
BASOPHILS NFR BLD AUTO: 0.2 %
EOSINOPHIL # BLD AUTO: 0.14 X10*3/UL (ref 0–0.7)
EOSINOPHIL NFR BLD AUTO: 3.3 %
ERYTHROCYTE [DISTWIDTH] IN BLOOD BY AUTOMATED COUNT: 15.9 % (ref 11.5–14.5)
HCT VFR BLD AUTO: 34 % (ref 36–46)
HGB BLD-MCNC: 10.7 G/DL (ref 12–16)
IMM GRANULOCYTES # BLD AUTO: 0.01 X10*3/UL (ref 0–0.7)
IMM GRANULOCYTES NFR BLD AUTO: 0.2 % (ref 0–0.9)
LYMPHOCYTES # BLD AUTO: 1.64 X10*3/UL (ref 1.2–4.8)
LYMPHOCYTES NFR BLD AUTO: 38.8 %
MCH RBC QN AUTO: 24.9 PG (ref 26–34)
MCHC RBC AUTO-ENTMCNC: 31.5 G/DL (ref 32–36)
MCV RBC AUTO: 79 FL (ref 80–100)
MONOCYTES # BLD AUTO: 0.3 X10*3/UL (ref 0.1–1)
MONOCYTES NFR BLD AUTO: 7.1 %
NEUTROPHILS # BLD AUTO: 2.13 X10*3/UL (ref 1.2–7.7)
NEUTROPHILS NFR BLD AUTO: 50.4 %
NRBC BLD-RTO: 0 /100 WBCS (ref 0–0)
PLATELET # BLD AUTO: 227 X10*3/UL (ref 150–450)
RBC # BLD AUTO: 4.29 X10*6/UL (ref 4–5.2)
RH FACTOR (ANTIGEN D): NORMAL
WBC # BLD AUTO: 4.2 X10*3/UL (ref 4.4–11.3)

## 2025-06-24 PROCEDURE — 99214 OFFICE O/P EST MOD 30 MIN: CPT

## 2025-06-24 PROCEDURE — 85025 COMPLETE CBC W/AUTO DIFF WBC: CPT | Performed by: ADVANCED PRACTICE MIDWIFE

## 2025-06-24 PROCEDURE — 36415 COLL VENOUS BLD VENIPUNCTURE: CPT

## 2025-06-24 PROCEDURE — 36415 COLL VENOUS BLD VENIPUNCTURE: CPT | Performed by: ADVANCED PRACTICE MIDWIFE

## 2025-06-24 PROCEDURE — 86901 BLOOD TYPING SEROLOGIC RH(D): CPT | Performed by: ADVANCED PRACTICE MIDWIFE

## 2025-06-24 RX ORDER — AMOXICILLIN AND CLAVULANATE POTASSIUM 875; 125 MG/1; MG/1
875 TABLET, FILM COATED ORAL 2 TIMES DAILY
Qty: 14 TABLET | Refills: 0 | Status: SHIPPED | OUTPATIENT
Start: 2025-06-24

## 2025-06-24 SDOH — HEALTH STABILITY: MENTAL HEALTH: WISH TO BE DEAD (PAST 1 MONTH): NO

## 2025-06-24 SDOH — SOCIAL STABILITY: SOCIAL INSECURITY: HAVE YOU HAD ANY THOUGHTS OF HARMING ANYONE ELSE?: NO

## 2025-06-24 SDOH — HEALTH STABILITY: MENTAL HEALTH: SUICIDAL BEHAVIOR (LIFETIME): NO

## 2025-06-24 SDOH — SOCIAL STABILITY: SOCIAL INSECURITY: VERBAL ABUSE: DENIES

## 2025-06-24 SDOH — HEALTH STABILITY: MENTAL HEALTH: NON-SPECIFIC ACTIVE SUICIDAL THOUGHTS (PAST 1 MONTH): NO

## 2025-06-24 SDOH — SOCIAL STABILITY: SOCIAL INSECURITY: ARE YOU OR HAVE YOU BEEN THREATENED OR ABUSED PHYSICALLY, EMOTIONALLY, OR SEXUALLY BY ANYONE?: NO

## 2025-06-24 SDOH — SOCIAL STABILITY: SOCIAL INSECURITY: DO YOU FEEL ANYONE HAS EXPLOITED OR TAKEN ADVANTAGE OF YOU FINANCIALLY OR OF YOUR PERSONAL PROPERTY?: NO

## 2025-06-24 SDOH — ECONOMIC STABILITY: HOUSING INSECURITY: DO YOU FEEL UNSAFE GOING BACK TO THE PLACE WHERE YOU ARE LIVING?: NO

## 2025-06-24 SDOH — SOCIAL STABILITY: SOCIAL INSECURITY: PHYSICAL ABUSE: DENIES

## 2025-06-24 SDOH — SOCIAL STABILITY: SOCIAL INSECURITY: HAS ANYONE EVER THREATENED TO HURT YOUR FAMILY OR YOUR PETS?: NO

## 2025-06-24 SDOH — SOCIAL STABILITY: SOCIAL INSECURITY: ABUSE SCREEN: ADULT

## 2025-06-24 SDOH — SOCIAL STABILITY: SOCIAL INSECURITY: DOES ANYONE TRY TO KEEP YOU FROM HAVING/CONTACTING OTHER FRIENDS OR DOING THINGS OUTSIDE YOUR HOME?: NO

## 2025-06-24 SDOH — HEALTH STABILITY: MENTAL HEALTH: WERE YOU ABLE TO COMPLETE ALL THE BEHAVIORAL HEALTH SCREENINGS?: YES

## 2025-06-24 SDOH — HEALTH STABILITY: MENTAL HEALTH: HAVE YOU USED ANY SUBSTANCES (CANABIS, COCAINE, HEROIN, HALLUCINOGENS, INHALANTS, ETC.) IN THE PAST 12 MONTHS?: NO

## 2025-06-24 SDOH — SOCIAL STABILITY: SOCIAL INSECURITY: ARE THERE ANY APPARENT SIGNS OF INJURIES/BEHAVIORS THAT COULD BE RELATED TO ABUSE/NEGLECT?: NO

## 2025-06-24 SDOH — HEALTH STABILITY: MENTAL HEALTH: HAVE YOU USED ANY PRESCRIPTION DRUGS OTHER THAN PRESCRIBED IN THE PAST 12 MONTHS?: NO

## 2025-06-24 SDOH — SOCIAL STABILITY: SOCIAL INSECURITY: HAVE YOU HAD THOUGHTS OF HARMING ANYONE ELSE?: NO

## 2025-06-24 ASSESSMENT — LIFESTYLE VARIABLES
SKIP TO QUESTIONS 9-10: 1
HOW MANY STANDARD DRINKS CONTAINING ALCOHOL DO YOU HAVE ON A TYPICAL DAY: PATIENT DOES NOT DRINK
HOW OFTEN DO YOU HAVE 6 OR MORE DRINKS ON ONE OCCASION: NEVER
AUDIT-C TOTAL SCORE: 0
HOW OFTEN DO YOU HAVE A DRINK CONTAINING ALCOHOL: NEVER
AUDIT-C TOTAL SCORE: 0

## 2025-06-24 ASSESSMENT — PATIENT HEALTH QUESTIONNAIRE - PHQ9
2. FEELING DOWN, DEPRESSED OR HOPELESS: NOT AT ALL
1. LITTLE INTEREST OR PLEASURE IN DOING THINGS: NOT AT ALL
SUM OF ALL RESPONSES TO PHQ9 QUESTIONS 1 & 2: 0

## 2025-06-24 NOTE — H&P
OB Triage H&P    Assessment/Plan    Argelia Valentine is a 25 y.o.  at Unknown, RAHEL: Not found., who presents to triage with increased bleeding at almost 6 weeks s/p 4th c/s.     Plan    -Up to date on care  -Continue routine care    Dispo  -Patient appropriate for discharge home, agrees with plan  -Return precautions discussed   -Follow up at next scheduled OB appointment or to triage sooner as needed    {Discussed plan and reviewed with:[id number]}    Pregnancy Problems (from 25 to present)       No problems associated with this episode.            Subjective   26yo  presents 5 1/2 weeks post her 4th c/s reporting that her bleeding has never stopped or slowed, and for several days has now been increasing. She notes that 4-5 days ago her bleeding started to increase, then about 2 days ago she started passing small (dime sized) clots which have grown today to larger than quarter sized clots. She notices no foul odor. No fevers/chills. Normal bladder & bowel function (although has not had a bm in 3 days now). She also has lower abdominal pain.     Prenatal Provider pati    OB History    Para Term  AB Living   7 4 3 1 2 4   SAB IAB Ectopic Multiple Live Births   1 1 0 0 4      # Outcome Date GA Lbr Jonn/2nd Weight Sex Type Anes PTL Lv   7 Current            6 Term 25 38w6d  2.72 kg F CS-LTranv Spinal  BETZY      Name: Bryce Pradhan      Apgar1: 7  Apgar5: 7   5 SAB      Complete      4 Term     M CS-LTranv      3 Term     M CS-LTranv      2  12/23/15 32w0d   F CS-LTranv   BETZY      Complications: Severe pre-eclampsia   1 IAB 12     D&E Gen         Surgical History[1]    Social History     Tobacco Use    Smoking status: Former     Types: Cigarettes, Cigars     Passive exposure: Never    Smokeless tobacco: Never    Tobacco comments:     Infrequent smoker.  Patient notes is not a problem   Substance Use Topics    Alcohol use: Never        Allergies[2]    Prescriptions Prior to Admission[3]  Objective     Last Vitals  Temp Pulse Resp BP MAP O2 Sat   36.2 °C (97.2 °F) 69 14 107/56 75 99 %     Blood Pressures         2025  1542             BP: 107/56             Physical Exam  General: NAD, mood appropriate  Cardiopulmonary: warm and well perfused, breathing comfortably on room air  Abdomen: incision well healing. Fundus palpates to about 14-15 size with mild tenderness  Extremities: Symmetric  Speculum Exam:   Cervix firmly closed. +tenderness when uterus is palpated internally. +fresh blood on glove but no excessive bleeding or clots noted    Chaperone Present: Yes.  Chaperone Name/Title: RN  Examination Chaperoned: ve               [1]   Past Surgical History:  Procedure Laterality Date    OTHER SURGICAL HISTORY  10/27/2020    Dilation and curettage    OTHER SURGICAL HISTORY  10/27/2020     section   [2]   Allergies  Allergen Reactions    Sumatriptan Anaphylaxis   [3]   Medications Prior to Admission   Medication Sig Dispense Refill Last Dose/Taking    docusate sodium (Colace) 100 mg capsule Take 1 capsule (100 mg) by mouth 2 times a day as needed for constipation. 30 capsule 5 Past Week    lurasidone (Latuda) 20 mg tablet    Past Week    oxyCODONE (Roxicodone) 5 mg immediate release tablet Take 1 tablet (5 mg) by mouth every 6 hours if needed for moderate pain (4 - 6) or severe pain (7 - 10). 15 tablet 0 More than a month    Prenatal Vitamin 27 mg iron- 0.8 mg tablet Take 1 tablet by mouth early in the morning.. 30 tablet 1 2025    albuterol 90 mcg/actuation inhaler Inhale 2 puffs every 4 hours if needed for wheezing. 18 g 0     cholecalciferol, vitamin D3, 250 mcg (10,000 unit) capsule Take 1 capsule (250 mcg) by mouth 1 (one) time per week. 13 capsule 3     ferrous sulfate 324 mg (65 mg elemental iron) EC tablet (delayed release) Take 1 tablet by mouth once daily. Do not crush, split or chew (Patient not taking: Reported on  6/5/2025) 90 tablet 0     metoclopramide (Reglan) 10 mg tablet Take 1 tablet (10 mg) by mouth 1 time if needed (lactation) for up to 10 days. 80 tablet 2

## 2025-06-24 NOTE — TELEPHONE ENCOUNTER
Patient called into the [place] office and was transferred to this nurse and stated that she started bleeding heavier on Friday, transitioning from pads to bleeding through adult pull ups and began to pass multiple clots throughout the day as of yesterday, sizes varying from dime to quarter sizes in addition to experiencing uti type of pressure. Patient was highly encouraged to report to L&D triage. Patient verbalized understanding and had no further questions, comments or concerns at this time.    PERLA Nieves

## 2025-06-24 NOTE — SIGNIFICANT EVENT
Asked to evaluate patient by CNM for increased bleeding postpartum.    Visit Vitals  /56   Pulse 69   Temp 36.2 °C (97.2 °F) (Temporal)   Resp 14   On exam, minimal blood on pad, cervix closed  Mild fundal tenderness  Hgb increased from discharge 9.0-->10.7  Discussed amount of bleeding appears to be wnl and reassuring that hgb has increased.  Will treat with 1 week course of augmentin given fundal tenderness. Reviewed return precautions.  Keep scheduled appointment with Dr. Navarro on Thursday.

## 2025-06-24 NOTE — TELEPHONE ENCOUNTER
THIS PATIENT CALLED REGARDING BLOOD CLOTS CONCERNS, AND URINATING CONCERNS.    THE PATIENT WAS WARM TRANSFERRED TO OUR NURSE CHELSEA.    THANK YOU

## 2025-06-26 ENCOUNTER — APPOINTMENT (OUTPATIENT)
Dept: OBSTETRICS AND GYNECOLOGY | Facility: CLINIC | Age: 25
End: 2025-06-26
Payer: COMMERCIAL

## 2025-06-26 VITALS
WEIGHT: 143 LBS | DIASTOLIC BLOOD PRESSURE: 67 MMHG | HEIGHT: 60 IN | BODY MASS INDEX: 28.07 KG/M2 | SYSTOLIC BLOOD PRESSURE: 100 MMHG

## 2025-06-26 DIAGNOSIS — Z98.891 S/P C-SECTION: Primary | ICD-10-CM

## 2025-06-26 RX ORDER — LURASIDONE HYDROCHLORIDE 60 MG/1
TABLET, FILM COATED ORAL
COMMUNITY
Start: 2025-04-27

## 2025-06-26 RX ORDER — ARIPIPRAZOLE 400 MG
KIT INTRAMUSCULAR
COMMUNITY
Start: 2024-08-21

## 2025-06-26 ASSESSMENT — ENCOUNTER SYMPTOMS
HEMATOLOGIC/LYMPHATIC NEGATIVE: 0
EYES NEGATIVE: 0
RESPIRATORY NEGATIVE: 0
CARDIOVASCULAR NEGATIVE: 0
CONSTITUTIONAL NEGATIVE: 0
PSYCHIATRIC NEGATIVE: 0
MUSCULOSKELETAL NEGATIVE: 0
ENDOCRINE NEGATIVE: 0
GASTROINTESTINAL NEGATIVE: 0
NEUROLOGICAL NEGATIVE: 0
ALLERGIC/IMMUNOLOGIC NEGATIVE: 0

## 2025-06-26 ASSESSMENT — EDINBURGH POSTNATAL DEPRESSION SCALE (EPDS)
I HAVE FELT SAD OR MISERABLE: YES, QUITE OFTEN
I HAVE FELT SCARED OR PANICKY FOR NO GOOD REASON: YES, SOMETIMES
I HAVE BEEN ABLE TO LAUGH AND SEE THE FUNNY SIDE OF THINGS: DEFINITELY NOT SO MUCH NOW
TOTAL SCORE: 21
I HAVE BLAMED MYSELF UNNECESSARILY WHEN THINGS WENT WRONG: YES, MOST OF THE TIME
I HAVE LOOKED FORWARD WITH ENJOYMENT TO THINGS: DEFINITELY LESS THAN I USED TO
I HAVE BEEN SO UNHAPPY THAT I HAVE BEEN CRYING: YES, QUITE OFTEN
THINGS HAVE BEEN GETTING ON TOP OF ME: YES, SOMETIMES I HAVEN'T BEEN COPING AS WELL AS USUAL
I HAVE BEEN SO UNHAPPY THAT I HAVE HAD DIFFICULTY SLEEPING: YES, MOST OF THE TIME
I HAVE BEEN ANXIOUS OR WORRIED FOR NO GOOD REASON: YES, VERY OFTEN
THE THOUGHT OF HARMING MYSELF HAS OCCURRED TO ME: NEVER

## 2025-06-26 ASSESSMENT — PAIN SCALES - GENERAL: PAINLEVEL_OUTOF10: 0-NO PAIN

## 2025-06-26 NOTE — PROGRESS NOTES
Postpartum Progress Note    Assessment/Plan   Argelia Valentine is a 25 y.o., , who delivered at Unknown gestation and is now postpartum day .    7 weeks postpartum  Breast-feeding with borderline supply  Overwhelmed with childcare and continues to work through support issues  Sees psychologist/psychiatrist tomorrow.  Suggested zeros [place] if appropriate    Subjective   Her pain is well controlled with current medications  She is passing flatus  She is ambulating well  She is tolerating a No diet orders on file  She reports poor milk letdown  She reports emotional concerns about overwhelmed with childcare issues and reports no suicidal/homicidal ideation today   Her plan for contraception is none         Objective     Last Vitals:  Temp Pulse Resp BP MAP Pulse Ox         100/67         Vitals Min/Max Last 24 Hours:  @FLOWSTAT(6,8,9,5,208633:24::1)@    Intake/Output:   [unfilled]    Physical Exam:  Abd: soft  Fundus: firm  Ext: NT no edema  Perinium: intact  No significant bleeding          Lab Data:

## 2025-07-02 DIAGNOSIS — Z00.00 HEALTHCARE MAINTENANCE: ICD-10-CM

## 2025-07-03 RX ORDER — B-COMPLEX WITH VITAMIN C
1 TABLET ORAL
Qty: 90 TABLET | Refills: 1 | Status: SHIPPED | OUTPATIENT
Start: 2025-07-03

## 2025-07-05 ENCOUNTER — HOSPITAL ENCOUNTER (EMERGENCY)
Facility: HOSPITAL | Age: 25
Discharge: LEFT BEFORE DISCHARGE | End: 2025-07-05
Payer: COMMERCIAL

## 2025-07-05 PROCEDURE — 4500999001 HC ED NO CHARGE

## 2025-07-14 ENCOUNTER — APPOINTMENT (OUTPATIENT)
Dept: OBSTETRICS AND GYNECOLOGY | Facility: CLINIC | Age: 25
End: 2025-07-14
Payer: COMMERCIAL

## 2025-07-14 DIAGNOSIS — Z30.42 ENCOUNTER FOR MANAGEMENT AND INJECTION OF DEPO-PROVERA: Primary | ICD-10-CM

## 2025-07-14 DIAGNOSIS — Z30.09 CONTRACEPTIVE EDUCATION: ICD-10-CM

## 2025-07-14 PROCEDURE — 96372 THER/PROPH/DIAG INJ SC/IM: CPT | Performed by: OBSTETRICS & GYNECOLOGY

## 2025-07-14 PROCEDURE — 99213 OFFICE O/P EST LOW 20 MIN: CPT | Performed by: OBSTETRICS & GYNECOLOGY

## 2025-07-14 RX ORDER — MEDROXYPROGESTERONE ACETATE 150 MG/ML
150 INJECTION, SUSPENSION INTRAMUSCULAR ONCE
Status: COMPLETED | OUTPATIENT
Start: 2025-07-14 | End: 2025-07-14

## 2025-07-14 RX ADMIN — MEDROXYPROGESTERONE ACETATE 150 MG: 150 INJECTION, SUSPENSION INTRAMUSCULAR at 13:32

## 2025-07-14 NOTE — PROGRESS NOTES
"Subjective   Patient ID: Argelia Valentine is a 25 y.o. female who presents for Est patient visit (BP: 98/57/WT: 141 lb/HT: 5'0\"/Wants to start on Depo/).  HPI  Patient here for Depo-Provera.  Has been on it in the past and would like to start.  Currently is breast-feeding.    Review of Systems  Negative review of systems    Objective   Physical Exam  Vitals reviewed.   Constitutional:       Appearance: Normal appearance.   Cardiovascular:      Rate and Rhythm: Normal rate and regular rhythm.   Pulmonary:      Effort: Pulmonary effort is normal.      Breath sounds: Normal breath sounds.   Musculoskeletal:      Cervical back: Normal range of motion and neck supple.   Neurological:      General: No focal deficit present.      Mental Status: She is alert.   Psychiatric:         Mood and Affect: Mood normal.         Behavior: Behavior normal.         Thought Content: Thought content normal.         Judgment: Judgment normal.       Assessment/Plan   Diagnoses and all orders for this visit:  Encounter for management and injection of depo-Provera  -     medroxyPROGESTERone (Depo-Provera) injection 150 mg  -     Follow Up In Gynecology; Future  Contraceptive education     Options for birth control with patient.  Risks and benefits discussed.  Discussed long-acting reversible such as Depo-Provera, IUD, subdermal implant.  Also discussed permanent sterilization as well.  At this point patient elected proceed with Depo-Provera which she has been on in the past.  Injection placed.  Return to office in 3 months    Yefri Jensen MD 07/14/25 11:51 AM   " Cardiac

## 2025-08-25 ENCOUNTER — HOSPITAL ENCOUNTER (EMERGENCY)
Facility: HOSPITAL | Age: 25
Discharge: HOME | End: 2025-08-25
Payer: COMMERCIAL

## 2025-08-25 VITALS
TEMPERATURE: 97.1 F | WEIGHT: 140 LBS | HEIGHT: 60 IN | RESPIRATION RATE: 16 BRPM | OXYGEN SATURATION: 99 % | SYSTOLIC BLOOD PRESSURE: 115 MMHG | DIASTOLIC BLOOD PRESSURE: 66 MMHG | BODY MASS INDEX: 27.48 KG/M2 | HEART RATE: 87 BPM

## 2025-08-25 DIAGNOSIS — T63.441A BEE STING, ACCIDENTAL OR UNINTENTIONAL, INITIAL ENCOUNTER: Primary | ICD-10-CM

## 2025-08-25 PROCEDURE — 99283 EMERGENCY DEPT VISIT LOW MDM: CPT

## 2025-08-25 RX ORDER — PREDNISONE 20 MG/1
40 TABLET ORAL DAILY
Qty: 6 TABLET | Refills: 0 | Status: SHIPPED | OUTPATIENT
Start: 2025-08-25 | End: 2025-08-28

## 2025-08-25 RX ORDER — ACETAMINOPHEN 500 MG
1000 TABLET ORAL EVERY 8 HOURS PRN
Qty: 18 TABLET | Refills: 0 | Status: SHIPPED | OUTPATIENT
Start: 2025-08-25 | End: 2025-08-28

## 2025-08-25 RX ORDER — CETIRIZINE HYDROCHLORIDE 10 MG/1
10 TABLET ORAL DAILY
Qty: 7 TABLET | Refills: 0 | Status: SHIPPED | OUTPATIENT
Start: 2025-08-25 | End: 2025-09-01

## 2025-08-25 RX ORDER — ACETAMINOPHEN 325 MG/1
975 TABLET ORAL ONCE
Status: DISCONTINUED | OUTPATIENT
Start: 2025-08-25 | End: 2025-08-25

## 2025-08-25 RX ORDER — PREDNISONE 20 MG/1
40 TABLET ORAL ONCE
Status: DISCONTINUED | OUTPATIENT
Start: 2025-08-25 | End: 2025-08-25

## 2025-08-25 RX ORDER — CETIRIZINE HYDROCHLORIDE 10 MG/1
10 TABLET ORAL ONCE
Status: DISCONTINUED | OUTPATIENT
Start: 2025-08-25 | End: 2025-08-25

## 2025-08-25 ASSESSMENT — PAIN SCALES - GENERAL: PAINLEVEL_OUTOF10: 8

## 2025-08-25 ASSESSMENT — PAIN - FUNCTIONAL ASSESSMENT: PAIN_FUNCTIONAL_ASSESSMENT: 0-10

## 2025-08-26 ENCOUNTER — PATIENT OUTREACH (OUTPATIENT)
Dept: CARE COORDINATION | Facility: CLINIC | Age: 25
End: 2025-08-26
Payer: COMMERCIAL

## 2025-08-27 ENCOUNTER — HOSPITAL ENCOUNTER (EMERGENCY)
Facility: HOSPITAL | Age: 25
Discharge: HOME | End: 2025-08-27
Attending: STUDENT IN AN ORGANIZED HEALTH CARE EDUCATION/TRAINING PROGRAM
Payer: COMMERCIAL

## 2025-08-27 ENCOUNTER — APPOINTMENT (OUTPATIENT)
Dept: RADIOLOGY | Facility: HOSPITAL | Age: 25
End: 2025-08-27
Payer: COMMERCIAL

## 2025-08-27 ENCOUNTER — APPOINTMENT (OUTPATIENT)
Dept: CARDIOLOGY | Facility: HOSPITAL | Age: 25
End: 2025-08-27
Payer: COMMERCIAL

## 2025-08-27 ASSESSMENT — PAIN DESCRIPTION - PAIN TYPE: TYPE: ACUTE PAIN

## 2025-08-27 ASSESSMENT — PAIN DESCRIPTION - FREQUENCY: FREQUENCY: CONSTANT/CONTINUOUS

## 2025-08-27 ASSESSMENT — PAIN SCALES - GENERAL
PAINLEVEL_OUTOF10: 9
PAINLEVEL_OUTOF10: 0 - NO PAIN

## 2025-08-27 ASSESSMENT — PAIN - FUNCTIONAL ASSESSMENT: PAIN_FUNCTIONAL_ASSESSMENT: 0-10

## 2025-08-27 ASSESSMENT — PAIN DESCRIPTION - ORIENTATION: ORIENTATION: RIGHT;LEFT

## 2025-08-27 ASSESSMENT — PAIN DESCRIPTION - LOCATION: LOCATION: GENERALIZED

## 2025-08-27 ASSESSMENT — PAIN DESCRIPTION - DESCRIPTORS: DESCRIPTORS: ACHING

## 2025-08-29 ENCOUNTER — PATIENT OUTREACH (OUTPATIENT)
Dept: CARE COORDINATION | Facility: CLINIC | Age: 25
End: 2025-08-29
Payer: COMMERCIAL

## 2025-08-29 ENCOUNTER — DOCUMENTATION (OUTPATIENT)
Dept: CARE COORDINATION | Facility: CLINIC | Age: 25
End: 2025-08-29
Payer: COMMERCIAL

## 2025-09-03 ENCOUNTER — DOCUMENTATION (OUTPATIENT)
Dept: CARE COORDINATION | Facility: CLINIC | Age: 25
End: 2025-09-03
Payer: COMMERCIAL

## 2025-10-02 ENCOUNTER — APPOINTMENT (OUTPATIENT)
Dept: OBSTETRICS AND GYNECOLOGY | Facility: CLINIC | Age: 25
End: 2025-10-02
Payer: COMMERCIAL

## (undated) DEVICE — SUTURE, MONOCRYL, 3-0, 36 IN, CT-1, UNDYED

## (undated) DEVICE — STRAP, VELCRO, BODY, 4 X 60IN, NS

## (undated) DEVICE — DRAPE PACK, CESAREAN SECTION, CUSTOM, UHC

## (undated) DEVICE — COVER HANDLE LIGHT, STERIS, BLUE, STERILE

## (undated) DEVICE — DRESSING, MEPILEX BORDER, POST-OP AG, 4 X 10 IN

## (undated) DEVICE — SUTURE, VICRYL 0, 36 IN, CT-1, VIOLET

## (undated) DEVICE — GLOVE, SURGICAL, PROTEXIS PI , 7.5, PF, LF

## (undated) DEVICE — SUTURE, VICRYL, 4-0, 27 IN, KS, UNDYED

## (undated) DEVICE — SOLUTION, IRRIGATION, SODIUM CHLORIDE 0.9%, 1000 ML, POUR BOTTLE

## (undated) DEVICE — CATHETER TRAY, SURESTEP, 16FR, URINE METER W/STATLOCK

## (undated) DEVICE — SUTURE, MONOCRYL, 1, 36 IN, CTX, VIOLET

## (undated) DEVICE — SUTURE, VICRYL, 2-0, 27 IN, CT-1, VIOLET

## (undated) DEVICE — Device

## (undated) DEVICE — GOWN, SURGICAL, SMARTGOWN, XLARGE, STERILE